# Patient Record
Sex: MALE | Race: WHITE | NOT HISPANIC OR LATINO
[De-identification: names, ages, dates, MRNs, and addresses within clinical notes are randomized per-mention and may not be internally consistent; named-entity substitution may affect disease eponyms.]

---

## 2017-01-13 ENCOUNTER — APPOINTMENT (OUTPATIENT)
Dept: MRI IMAGING | Facility: CLINIC | Age: 62
End: 2017-01-13

## 2017-01-13 ENCOUNTER — OUTPATIENT (OUTPATIENT)
Dept: OUTPATIENT SERVICES | Facility: HOSPITAL | Age: 62
LOS: 1 days | End: 2017-01-13
Payer: COMMERCIAL

## 2017-01-13 DIAGNOSIS — Z00.8 ENCOUNTER FOR OTHER GENERAL EXAMINATION: ICD-10-CM

## 2017-01-13 PROCEDURE — 73221 MRI JOINT UPR EXTREM W/O DYE: CPT

## 2017-01-23 ENCOUNTER — TRANSCRIPTION ENCOUNTER (OUTPATIENT)
Age: 62
End: 2017-01-23

## 2017-01-27 ENCOUNTER — APPOINTMENT (OUTPATIENT)
Dept: CARDIOLOGY | Facility: CLINIC | Age: 62
End: 2017-01-27

## 2017-01-27 ENCOUNTER — LABORATORY RESULT (OUTPATIENT)
Age: 62
End: 2017-01-27

## 2017-01-27 ENCOUNTER — NON-APPOINTMENT (OUTPATIENT)
Age: 62
End: 2017-01-27

## 2017-01-27 VITALS
OXYGEN SATURATION: 98 % | WEIGHT: 195 LBS | DIASTOLIC BLOOD PRESSURE: 68 MMHG | HEART RATE: 74 BPM | SYSTOLIC BLOOD PRESSURE: 136 MMHG | HEIGHT: 70 IN | BODY MASS INDEX: 27.92 KG/M2

## 2017-01-27 VITALS — WEIGHT: 195 LBS | HEIGHT: 70 IN | BODY MASS INDEX: 27.92 KG/M2

## 2017-01-30 LAB
ALBUMIN SERPL ELPH-MCNC: 4.5 G/DL
ALP BLD-CCNC: 58 U/L
ALT SERPL-CCNC: 20 U/L
ANION GAP SERPL CALC-SCNC: 16 MMOL/L
AST SERPL-CCNC: 18 U/L
BASOPHILS # BLD AUTO: 0.08 K/UL
BASOPHILS NFR BLD AUTO: 0.9 %
BILIRUB SERPL-MCNC: 0.6 MG/DL
BUN SERPL-MCNC: 16 MG/DL
CALCIUM SERPL-MCNC: 9.6 MG/DL
CHLORIDE SERPL-SCNC: 103 MMOL/L
CHOLEST SERPL-MCNC: 125 MG/DL
CHOLEST/HDLC SERPL: 2.4 RATIO
CO2 SERPL-SCNC: 24 MMOL/L
CREAT SERPL-MCNC: 0.88 MG/DL
EOSINOPHIL # BLD AUTO: 0.23 K/UL
EOSINOPHIL NFR BLD AUTO: 2.6 %
GLUCOSE SERPL-MCNC: 103 MG/DL
HBA1C MFR BLD HPLC: 5.8 %
HCT VFR BLD CALC: 42.2 %
HDLC SERPL-MCNC: 52 MG/DL
HGB BLD-MCNC: 13.8 G/DL
LDLC SERPL CALC-MCNC: 54 MG/DL
LYMPHOCYTES # BLD AUTO: 1.98 K/UL
LYMPHOCYTES NFR BLD AUTO: 21.9 %
MAN DIFF?: NORMAL
MCHC RBC-ENTMCNC: 30 PG
MCHC RBC-ENTMCNC: 32.7 GM/DL
MCV RBC AUTO: 91.7 FL
MONOCYTES # BLD AUTO: 0.16 K/UL
MONOCYTES NFR BLD AUTO: 1.8 %
NEUTROPHILS # BLD AUTO: 6.41 K/UL
NEUTROPHILS NFR BLD AUTO: 71 %
PLATELET # BLD AUTO: 322 K/UL
POTASSIUM SERPL-SCNC: 4.7 MMOL/L
PROT SERPL-MCNC: 7.1 G/DL
PSA FREE FLD-MCNC: 22.5 %
PSA FREE SERPL-MCNC: 0.02 NG/ML
PSA SERPL-MCNC: 0.11 NG/ML
RBC # BLD: 4.6 M/UL
RBC # FLD: 14.8 %
SODIUM SERPL-SCNC: 143 MMOL/L
TRIGL SERPL-MCNC: 97 MG/DL
TSH SERPL-ACNC: 1.98 UIU/ML
WBC # FLD AUTO: 9.03 K/UL

## 2017-03-06 ENCOUNTER — APPOINTMENT (OUTPATIENT)
Dept: OPHTHALMOLOGY | Facility: CLINIC | Age: 62
End: 2017-03-06

## 2017-03-06 RX ORDER — OXYCODONE HYDROCHLORIDE AND ACETAMINOPHEN 10; 325 MG/1; MG/1
10-325 TABLET ORAL
Refills: 0 | Status: DISCONTINUED | COMMUNITY
End: 2017-03-06

## 2017-03-10 ENCOUNTER — APPOINTMENT (OUTPATIENT)
Dept: OPHTHALMOLOGY | Facility: CLINIC | Age: 62
End: 2017-03-10

## 2017-03-15 ENCOUNTER — NON-APPOINTMENT (OUTPATIENT)
Age: 62
End: 2017-03-15

## 2017-03-15 ENCOUNTER — APPOINTMENT (OUTPATIENT)
Dept: CARDIOLOGY | Facility: CLINIC | Age: 62
End: 2017-03-15

## 2017-03-15 VITALS
DIASTOLIC BLOOD PRESSURE: 73 MMHG | SYSTOLIC BLOOD PRESSURE: 146 MMHG | WEIGHT: 192 LBS | HEART RATE: 64 BPM | OXYGEN SATURATION: 99 % | HEIGHT: 70 IN | BODY MASS INDEX: 27.49 KG/M2

## 2017-03-15 DIAGNOSIS — Z01.818 ENCOUNTER FOR OTHER PREPROCEDURAL EXAMINATION: ICD-10-CM

## 2017-03-17 ENCOUNTER — APPOINTMENT (OUTPATIENT)
Dept: OPHTHALMOLOGY | Facility: CLINIC | Age: 62
End: 2017-03-17

## 2017-04-03 ENCOUNTER — APPOINTMENT (OUTPATIENT)
Dept: OPHTHALMOLOGY | Facility: CLINIC | Age: 62
End: 2017-04-03

## 2017-04-05 ENCOUNTER — RX RENEWAL (OUTPATIENT)
Age: 62
End: 2017-04-05

## 2017-05-17 ENCOUNTER — APPOINTMENT (OUTPATIENT)
Dept: MAMMOGRAPHY | Facility: CLINIC | Age: 62
End: 2017-05-17

## 2017-05-17 ENCOUNTER — OUTPATIENT (OUTPATIENT)
Dept: OUTPATIENT SERVICES | Facility: HOSPITAL | Age: 62
LOS: 1 days | End: 2017-05-17
Payer: COMMERCIAL

## 2017-05-17 ENCOUNTER — APPOINTMENT (OUTPATIENT)
Dept: ULTRASOUND IMAGING | Facility: CLINIC | Age: 62
End: 2017-05-17

## 2017-05-17 DIAGNOSIS — Z00.8 ENCOUNTER FOR OTHER GENERAL EXAMINATION: ICD-10-CM

## 2017-05-17 PROCEDURE — 77066 DX MAMMO INCL CAD BI: CPT

## 2017-05-17 PROCEDURE — G0279: CPT

## 2017-05-17 PROCEDURE — G0279: CPT | Mod: 26

## 2017-05-17 PROCEDURE — 76641 ULTRASOUND BREAST COMPLETE: CPT

## 2017-05-17 PROCEDURE — 76641 ULTRASOUND BREAST COMPLETE: CPT | Mod: 26,50

## 2017-05-17 PROCEDURE — G0204: CPT | Mod: 26

## 2017-06-12 ENCOUNTER — APPOINTMENT (OUTPATIENT)
Dept: OPHTHALMOLOGY | Facility: CLINIC | Age: 62
End: 2017-06-12

## 2017-06-28 ENCOUNTER — RX RENEWAL (OUTPATIENT)
Age: 62
End: 2017-06-28

## 2017-07-10 ENCOUNTER — MEDICATION RENEWAL (OUTPATIENT)
Age: 62
End: 2017-07-10

## 2017-09-06 ENCOUNTER — NON-APPOINTMENT (OUTPATIENT)
Age: 62
End: 2017-09-06

## 2017-09-06 ENCOUNTER — APPOINTMENT (OUTPATIENT)
Dept: CARDIOLOGY | Facility: CLINIC | Age: 62
End: 2017-09-06
Payer: COMMERCIAL

## 2017-09-06 VITALS
BODY MASS INDEX: 28.49 KG/M2 | OXYGEN SATURATION: 95 % | WEIGHT: 199 LBS | HEIGHT: 70 IN | HEART RATE: 72 BPM | DIASTOLIC BLOOD PRESSURE: 81 MMHG | SYSTOLIC BLOOD PRESSURE: 139 MMHG

## 2017-09-06 DIAGNOSIS — F17.200 NICOTINE DEPENDENCE, UNSPECIFIED, UNCOMPLICATED: ICD-10-CM

## 2017-09-06 DIAGNOSIS — H16.002 UNSPECIFIED CORNEAL ULCER, LEFT EYE: ICD-10-CM

## 2017-09-06 PROCEDURE — 99214 OFFICE O/P EST MOD 30 MIN: CPT | Mod: 25

## 2017-09-06 PROCEDURE — 99406 BEHAV CHNG SMOKING 3-10 MIN: CPT

## 2017-09-06 PROCEDURE — 93000 ELECTROCARDIOGRAM COMPLETE: CPT

## 2017-09-24 ENCOUNTER — TRANSCRIPTION ENCOUNTER (OUTPATIENT)
Age: 62
End: 2017-09-24

## 2017-09-29 ENCOUNTER — APPOINTMENT (OUTPATIENT)
Dept: CARDIOLOGY | Facility: CLINIC | Age: 62
End: 2017-09-29
Payer: COMMERCIAL

## 2017-09-29 PROCEDURE — 90686 IIV4 VACC NO PRSV 0.5 ML IM: CPT

## 2017-09-29 PROCEDURE — 90471 IMMUNIZATION ADMIN: CPT

## 2017-10-02 ENCOUNTER — RX RENEWAL (OUTPATIENT)
Age: 62
End: 2017-10-02

## 2017-10-10 ENCOUNTER — RX RENEWAL (OUTPATIENT)
Age: 62
End: 2017-10-10

## 2017-12-05 ENCOUNTER — APPOINTMENT (OUTPATIENT)
Dept: MRI IMAGING | Facility: CLINIC | Age: 62
End: 2017-12-05
Payer: COMMERCIAL

## 2017-12-05 ENCOUNTER — OUTPATIENT (OUTPATIENT)
Dept: OUTPATIENT SERVICES | Facility: HOSPITAL | Age: 62
LOS: 1 days | End: 2017-12-05
Payer: COMMERCIAL

## 2017-12-05 DIAGNOSIS — Z00.8 ENCOUNTER FOR OTHER GENERAL EXAMINATION: ICD-10-CM

## 2017-12-05 PROCEDURE — A9585: CPT

## 2017-12-05 PROCEDURE — 74183 MRI ABD W/O CNTR FLWD CNTR: CPT | Mod: 26

## 2017-12-05 PROCEDURE — 74183 MRI ABD W/O CNTR FLWD CNTR: CPT

## 2017-12-14 ENCOUNTER — OUTPATIENT (OUTPATIENT)
Dept: OUTPATIENT SERVICES | Facility: HOSPITAL | Age: 62
LOS: 1 days | End: 2017-12-14
Payer: COMMERCIAL

## 2017-12-14 ENCOUNTER — APPOINTMENT (OUTPATIENT)
Dept: CT IMAGING | Facility: CLINIC | Age: 62
End: 2017-12-14

## 2017-12-14 DIAGNOSIS — Z00.8 ENCOUNTER FOR OTHER GENERAL EXAMINATION: ICD-10-CM

## 2017-12-14 PROCEDURE — 71250 CT THORAX DX C-: CPT | Mod: 26

## 2017-12-14 PROCEDURE — 71250 CT THORAX DX C-: CPT

## 2017-12-18 ENCOUNTER — APPOINTMENT (OUTPATIENT)
Dept: OPHTHALMOLOGY | Facility: CLINIC | Age: 62
End: 2017-12-18
Payer: COMMERCIAL

## 2017-12-18 PROCEDURE — 92014 COMPRE OPH EXAM EST PT 1/>: CPT

## 2017-12-29 ENCOUNTER — APPOINTMENT (OUTPATIENT)
Dept: CARDIOLOGY | Facility: CLINIC | Age: 62
End: 2017-12-29
Payer: COMMERCIAL

## 2017-12-29 PROCEDURE — 36415 COLL VENOUS BLD VENIPUNCTURE: CPT

## 2018-01-02 LAB
25(OH)D3 SERPL-MCNC: 45.6 NG/ML
ALBUMIN SERPL ELPH-MCNC: 4.4 G/DL
ALP BLD-CCNC: 63 U/L
ALT SERPL-CCNC: 23 U/L
ANION GAP SERPL CALC-SCNC: 10 MMOL/L
AST SERPL-CCNC: 17 U/L
BASOPHILS # BLD AUTO: 0.02 K/UL
BASOPHILS NFR BLD AUTO: 0.3 %
BILIRUB SERPL-MCNC: 0.5 MG/DL
BUN SERPL-MCNC: 16 MG/DL
CALCIUM SERPL-MCNC: 9.5 MG/DL
CHLORIDE SERPL-SCNC: 108 MMOL/L
CHOLEST SERPL-MCNC: 140 MG/DL
CHOLEST/HDLC SERPL: 2.5 RATIO
CK SERPL-CCNC: 112 U/L
CO2 SERPL-SCNC: 26 MMOL/L
CREAT SERPL-MCNC: 1.02 MG/DL
EOSINOPHIL # BLD AUTO: 0.19 K/UL
EOSINOPHIL NFR BLD AUTO: 2.9 %
GLUCOSE SERPL-MCNC: 106 MG/DL
HBA1C MFR BLD HPLC: 5.6 %
HCT VFR BLD CALC: 42.4 %
HDLC SERPL-MCNC: 56 MG/DL
HGB BLD-MCNC: 14.2 G/DL
IMM GRANULOCYTES NFR BLD AUTO: 0.6 %
LDLC SERPL CALC-MCNC: 72 MG/DL
LDLC SERPL DIRECT ASSAY-MCNC: 78 MG/DL
LYMPHOCYTES # BLD AUTO: 1.49 K/UL
LYMPHOCYTES NFR BLD AUTO: 22.4 %
MAN DIFF?: NORMAL
MCHC RBC-ENTMCNC: 30.3 PG
MCHC RBC-ENTMCNC: 33.5 GM/DL
MCV RBC AUTO: 90.6 FL
MONOCYTES # BLD AUTO: 0.53 K/UL
MONOCYTES NFR BLD AUTO: 8 %
NEUTROPHILS # BLD AUTO: 4.37 K/UL
NEUTROPHILS NFR BLD AUTO: 65.8 %
PLATELET # BLD AUTO: 269 K/UL
POTASSIUM SERPL-SCNC: 4.9 MMOL/L
PROT SERPL-MCNC: 6.8 G/DL
PSA FREE FLD-MCNC: 21.7
PSA FREE SERPL-MCNC: 0.05 NG/ML
PSA SERPL-MCNC: 0.23 NG/ML
RBC # BLD: 4.68 M/UL
RBC # FLD: 15.5 %
SODIUM SERPL-SCNC: 144 MMOL/L
TRIGL SERPL-MCNC: 59 MG/DL
TSH SERPL-ACNC: 1.36 UIU/ML
WBC # FLD AUTO: 6.64 K/UL

## 2018-01-15 ENCOUNTER — MEDICATION RENEWAL (OUTPATIENT)
Age: 63
End: 2018-01-15

## 2018-03-14 ENCOUNTER — APPOINTMENT (OUTPATIENT)
Dept: CARDIOLOGY | Facility: CLINIC | Age: 63
End: 2018-03-14
Payer: COMMERCIAL

## 2018-03-14 ENCOUNTER — NON-APPOINTMENT (OUTPATIENT)
Age: 63
End: 2018-03-14

## 2018-03-14 VITALS
SYSTOLIC BLOOD PRESSURE: 123 MMHG | DIASTOLIC BLOOD PRESSURE: 79 MMHG | OXYGEN SATURATION: 97 % | BODY MASS INDEX: 29.63 KG/M2 | HEIGHT: 70 IN | HEART RATE: 67 BPM | WEIGHT: 207 LBS

## 2018-03-14 DIAGNOSIS — H25.093 OTHER AGE-RELATED INCIPIENT CATARACT, BILATERAL: ICD-10-CM

## 2018-03-14 DIAGNOSIS — H16.012 CENTRAL CORNEAL ULCER, LEFT EYE: ICD-10-CM

## 2018-03-14 PROCEDURE — 93000 ELECTROCARDIOGRAM COMPLETE: CPT

## 2018-03-14 PROCEDURE — 99214 OFFICE O/P EST MOD 30 MIN: CPT | Mod: 25

## 2018-04-02 ENCOUNTER — MEDICATION RENEWAL (OUTPATIENT)
Age: 63
End: 2018-04-02

## 2018-04-03 ENCOUNTER — MEDICATION RENEWAL (OUTPATIENT)
Age: 63
End: 2018-04-03

## 2018-04-16 ENCOUNTER — MEDICATION RENEWAL (OUTPATIENT)
Age: 63
End: 2018-04-16

## 2018-04-24 ENCOUNTER — MEDICATION RENEWAL (OUTPATIENT)
Age: 63
End: 2018-04-24

## 2018-04-24 RX ORDER — BUPROPION HYDROCHLORIDE 150 MG/1
150 TABLET, FILM COATED ORAL 3 TIMES DAILY
Refills: 0 | Status: DISCONTINUED | COMMUNITY
End: 2018-04-24

## 2018-04-30 ENCOUNTER — MEDICATION RENEWAL (OUTPATIENT)
Age: 63
End: 2018-04-30

## 2018-05-09 ENCOUNTER — MEDICATION RENEWAL (OUTPATIENT)
Age: 63
End: 2018-05-09

## 2018-09-26 ENCOUNTER — NON-APPOINTMENT (OUTPATIENT)
Age: 63
End: 2018-09-26

## 2018-09-26 ENCOUNTER — APPOINTMENT (OUTPATIENT)
Dept: CARDIOLOGY | Facility: CLINIC | Age: 63
End: 2018-09-26
Payer: COMMERCIAL

## 2018-09-26 VITALS
DIASTOLIC BLOOD PRESSURE: 74 MMHG | BODY MASS INDEX: 28.35 KG/M2 | HEART RATE: 71 BPM | HEIGHT: 70 IN | OXYGEN SATURATION: 97 % | SYSTOLIC BLOOD PRESSURE: 126 MMHG | WEIGHT: 198 LBS

## 2018-09-26 PROCEDURE — 99214 OFFICE O/P EST MOD 30 MIN: CPT | Mod: 25

## 2018-09-26 PROCEDURE — 93000 ELECTROCARDIOGRAM COMPLETE: CPT

## 2018-09-26 RX ORDER — ALFUZOSIN HYDROCHLORIDE 10 MG/1
10 TABLET, EXTENDED RELEASE ORAL DAILY
Refills: 0 | Status: ACTIVE | COMMUNITY

## 2018-09-26 RX ORDER — ALFUZOSIN HYDROCHLORIDE 10 MG/1
10 TABLET, EXTENDED RELEASE ORAL DAILY
Qty: 90 | Refills: 2 | Status: DISCONTINUED | COMMUNITY
End: 2018-09-26

## 2018-09-27 ENCOUNTER — TRANSCRIPTION ENCOUNTER (OUTPATIENT)
Age: 63
End: 2018-09-27

## 2018-10-05 ENCOUNTER — APPOINTMENT (OUTPATIENT)
Dept: CARDIOLOGY | Facility: CLINIC | Age: 63
End: 2018-10-05
Payer: COMMERCIAL

## 2018-10-05 PROCEDURE — 90471 IMMUNIZATION ADMIN: CPT

## 2018-10-05 PROCEDURE — 90686 IIV4 VACC NO PRSV 0.5 ML IM: CPT

## 2018-10-17 ENCOUNTER — RX RENEWAL (OUTPATIENT)
Age: 63
End: 2018-10-17

## 2018-10-18 ENCOUNTER — MEDICATION RENEWAL (OUTPATIENT)
Age: 63
End: 2018-10-18

## 2018-12-26 ENCOUNTER — RX RENEWAL (OUTPATIENT)
Age: 63
End: 2018-12-26

## 2019-01-07 ENCOUNTER — APPOINTMENT (OUTPATIENT)
Dept: OPHTHALMOLOGY | Facility: CLINIC | Age: 64
End: 2019-01-07

## 2019-01-15 ENCOUNTER — RX RENEWAL (OUTPATIENT)
Age: 64
End: 2019-01-15

## 2019-01-16 ENCOUNTER — MEDICATION RENEWAL (OUTPATIENT)
Age: 64
End: 2019-01-16

## 2019-01-25 ENCOUNTER — MEDICATION RENEWAL (OUTPATIENT)
Age: 64
End: 2019-01-25

## 2019-01-25 ENCOUNTER — RX RENEWAL (OUTPATIENT)
Age: 64
End: 2019-01-25

## 2019-01-29 LAB
25(OH)D3 SERPL-MCNC: 43.8 NG/ML
BASOPHILS # BLD AUTO: 0.02 K/UL
BASOPHILS NFR BLD AUTO: 0.2 %
CHOLEST SERPL-MCNC: 132 MG/DL
CHOLEST/HDLC SERPL: 2.6 RATIO
CK SERPL-CCNC: 101 U/L
EOSINOPHIL # BLD AUTO: 0.17 K/UL
EOSINOPHIL NFR BLD AUTO: 2.1 %
HBA1C MFR BLD HPLC: 5.6 %
HCT VFR BLD CALC: 42.3 %
HDLC SERPL-MCNC: 51 MG/DL
HGB BLD-MCNC: 13.7 G/DL
IMM GRANULOCYTES NFR BLD AUTO: 1.3 %
LDLC SERPL CALC-MCNC: 68 MG/DL
LYMPHOCYTES # BLD AUTO: 1.67 K/UL
LYMPHOCYTES NFR BLD AUTO: 20.2 %
MAN DIFF?: NORMAL
MCHC RBC-ENTMCNC: 29.7 PG
MCHC RBC-ENTMCNC: 32.4 GM/DL
MCV RBC AUTO: 91.8 FL
MONOCYTES # BLD AUTO: 0.52 K/UL
MONOCYTES NFR BLD AUTO: 6.3 %
NEUTROPHILS # BLD AUTO: 5.76 K/UL
NEUTROPHILS NFR BLD AUTO: 69.9 %
PLATELET # BLD AUTO: 291 K/UL
PSA FREE FLD-MCNC: 32 %
PSA FREE SERPL-MCNC: 0.06 NG/ML
PSA SERPL-MCNC: 0.19 NG/ML
RBC # BLD: 4.61 M/UL
RBC # FLD: 15.6 %
T3FREE SERPL-MCNC: 2.98 PG/ML
T4 FREE SERPL-MCNC: 1.1 NG/DL
TRIGL SERPL-MCNC: 67 MG/DL
TSH SERPL-ACNC: 1.77 UIU/ML
WBC # FLD AUTO: 8.25 K/UL

## 2019-01-30 ENCOUNTER — MEDICATION RENEWAL (OUTPATIENT)
Age: 64
End: 2019-01-30

## 2019-01-30 ENCOUNTER — NON-APPOINTMENT (OUTPATIENT)
Age: 64
End: 2019-01-30

## 2019-01-30 ENCOUNTER — APPOINTMENT (OUTPATIENT)
Dept: CARDIOLOGY | Facility: CLINIC | Age: 64
End: 2019-01-30
Payer: COMMERCIAL

## 2019-01-30 VITALS
WEIGHT: 200 LBS | DIASTOLIC BLOOD PRESSURE: 78 MMHG | BODY MASS INDEX: 28.63 KG/M2 | SYSTOLIC BLOOD PRESSURE: 118 MMHG | HEIGHT: 70 IN

## 2019-01-30 PROCEDURE — 36415 COLL VENOUS BLD VENIPUNCTURE: CPT

## 2019-01-30 PROCEDURE — 99214 OFFICE O/P EST MOD 30 MIN: CPT | Mod: 25

## 2019-01-30 PROCEDURE — 93000 ELECTROCARDIOGRAM COMPLETE: CPT

## 2019-01-30 NOTE — REVIEW OF SYSTEMS
[see HPI] : see HPI [Negative] : Heme/Lymph [Feeling Fatigued] : not feeling fatigued [Shortness Of Breath] : no shortness of breath [Dyspnea on exertion] : not dyspnea during exertion [Chest  Pressure] : no chest pressure [Chest Pain] : no chest pain [Lower Ext Edema] : no extremity edema [Leg Claudication] : no intermittent leg claudication [Palpitations] : no palpitations [FreeTextEntry1] : Lower back issues as stated in HPI.

## 2019-01-30 NOTE — DISCUSSION/SUMMARY
[FreeTextEntry1] : Coronary artery disease/prior myocardial infarction: Stable currently and doing well overall. Stress test this year to monitor.\par Cigarette cessation: Discuss stopping cigarettes again. Emphasize the importance of him doing so, given his history. \par Hyperlipidemia/Prediabetes: Good on recent blood work. \par Follow up in 6 months.

## 2019-01-30 NOTE — CARDIOLOGY SUMMARY
[No Exercise Ind Arr] : no exercise induced arrhythmias [No Symptoms] : no Symptoms [___] : [unfilled] [LVEF ___%] : LVEF [unfilled]% [None] : no pulmonary hypertension [Enlarged] : enlarged LA size [Mild] : mild mitral regurgitation

## 2019-01-30 NOTE — PHYSICAL EXAM
[General Appearance - Well Developed] : well developed [Normal Appearance] : normal appearance [Well Groomed] : well groomed [General Appearance - Well Nourished] : well nourished [No Deformities] : no deformities [General Appearance - In No Acute Distress] : no acute distress [Normal Conjunctiva] : the conjunctiva exhibited no abnormalities [Eyelids - No Xanthelasma] : the eyelids demonstrated no xanthelasmas [Normal Oral Mucosa] : normal oral mucosa [No Oral Pallor] : no oral pallor [No Oral Cyanosis] : no oral cyanosis [Normal Jugular Venous A Waves Present] : normal jugular venous A waves present [Normal Jugular Venous V Waves Present] : normal jugular venous V waves present [No Jugular Venous Samaniego A Waves] : no jugular venous samaniego A waves [Respiration, Rhythm And Depth] : normal respiratory rhythm and effort [Exaggerated Use Of Accessory Muscles For Inspiration] : no accessory muscle use [Auscultation Breath Sounds / Voice Sounds] : lungs were clear to auscultation bilaterally [Abdomen Soft] : soft [Abdomen Tenderness] : non-tender [Abdomen Mass (___ Cm)] : no abdominal mass palpated [Abnormal Walk] : normal gait [Gait - Sufficient For Exercise Testing] : the gait was sufficient for exercise testing [Nail Clubbing] : no clubbing of the fingernails [Cyanosis, Localized] : no localized cyanosis [Petechial Hemorrhages (___cm)] : no petechial hemorrhages [Skin Color & Pigmentation] : normal skin color and pigmentation [] : no rash [No Venous Stasis] : no venous stasis [Skin Lesions] : no skin lesions [No Skin Ulcers] : no skin ulcer [No Xanthoma] : no  xanthoma was observed [Oriented To Time, Place, And Person] : oriented to person, place, and time [Affect] : the affect was normal [Mood] : the mood was normal [No Anxiety] : not feeling anxious [Normal Rate] : normal [Normal S1] : normal S1 [Normal S2] : normal S2 [I] : a grade 1 [2+] : left 2+ [1+] : left 1+ [No Abnormalities] : the abdominal aorta was not enlarged and no bruit was heard [No Pitting Edema] : no pitting edema present [S3] : no S3 [S4] : no S4 [Right Carotid Bruit] : no bruit heard over the right carotid [Left Carotid Bruit] : no bruit heard over the left carotid [Right Femoral Bruit] : no bruit heard over the right femoral artery [Left Femoral Bruit] : no bruit heard over the left femoral artery

## 2019-01-30 NOTE — HISTORY OF PRESENT ILLNESS
[FreeTextEntry1] : Denies chest discomfort, shortness of breath, orthopnea, lower extremity edema, palpitations, paroxysmal nocturnal dyspnea, presyncope or syncope.  Reviewed labs and results ok but no CMP which they will repeat today.

## 2019-02-01 ENCOUNTER — RX RENEWAL (OUTPATIENT)
Age: 64
End: 2019-02-01

## 2019-02-01 LAB
ALBUMIN SERPL ELPH-MCNC: 4.7 G/DL
ALP BLD-CCNC: 69 U/L
ALT SERPL-CCNC: 21 U/L
ANION GAP SERPL CALC-SCNC: 11 MMOL/L
AST SERPL-CCNC: 15 U/L
BILIRUB SERPL-MCNC: 0.5 MG/DL
BUN SERPL-MCNC: 17 MG/DL
CALCIUM SERPL-MCNC: 9.4 MG/DL
CHLORIDE SERPL-SCNC: 102 MMOL/L
CO2 SERPL-SCNC: 25 MMOL/L
CREAT SERPL-MCNC: 0.9 MG/DL
GLUCOSE SERPL-MCNC: 103 MG/DL
POTASSIUM SERPL-SCNC: 4.8 MMOL/L
PROT SERPL-MCNC: 7 G/DL
SODIUM SERPL-SCNC: 138 MMOL/L

## 2019-02-11 ENCOUNTER — MEDICATION RENEWAL (OUTPATIENT)
Age: 64
End: 2019-02-11

## 2019-02-12 ENCOUNTER — MEDICATION RENEWAL (OUTPATIENT)
Age: 64
End: 2019-02-12

## 2019-04-12 ENCOUNTER — RX RENEWAL (OUTPATIENT)
Age: 64
End: 2019-04-12

## 2019-04-29 ENCOUNTER — RX RENEWAL (OUTPATIENT)
Age: 64
End: 2019-04-29

## 2019-05-15 ENCOUNTER — TRANSCRIPTION ENCOUNTER (OUTPATIENT)
Age: 64
End: 2019-05-15

## 2019-07-08 ENCOUNTER — RX RENEWAL (OUTPATIENT)
Age: 64
End: 2019-07-08

## 2019-07-19 ENCOUNTER — APPOINTMENT (OUTPATIENT)
Dept: CARDIOLOGY | Facility: CLINIC | Age: 64
End: 2019-07-19
Payer: COMMERCIAL

## 2019-07-19 ENCOUNTER — RESULT CHARGE (OUTPATIENT)
Age: 64
End: 2019-07-19

## 2019-07-19 PROCEDURE — 93880 EXTRACRANIAL BILAT STUDY: CPT

## 2019-07-23 ENCOUNTER — APPOINTMENT (OUTPATIENT)
Dept: CARDIOLOGY | Facility: CLINIC | Age: 64
End: 2019-07-23
Payer: COMMERCIAL

## 2019-07-23 PROCEDURE — 93015 CV STRESS TEST SUPVJ I&R: CPT

## 2019-07-23 PROCEDURE — 78452 HT MUSCLE IMAGE SPECT MULT: CPT

## 2019-07-23 PROCEDURE — A9500: CPT

## 2019-07-31 ENCOUNTER — APPOINTMENT (OUTPATIENT)
Dept: CARDIOLOGY | Facility: CLINIC | Age: 64
End: 2019-07-31
Payer: COMMERCIAL

## 2019-07-31 ENCOUNTER — NON-APPOINTMENT (OUTPATIENT)
Age: 64
End: 2019-07-31

## 2019-07-31 VITALS — BODY MASS INDEX: 28.35 KG/M2 | WEIGHT: 198 LBS | HEIGHT: 70 IN

## 2019-07-31 VITALS — HEART RATE: 73 BPM | DIASTOLIC BLOOD PRESSURE: 71 MMHG | OXYGEN SATURATION: 98 % | SYSTOLIC BLOOD PRESSURE: 118 MMHG

## 2019-07-31 PROCEDURE — 93000 ELECTROCARDIOGRAM COMPLETE: CPT

## 2019-07-31 PROCEDURE — 99214 OFFICE O/P EST MOD 30 MIN: CPT | Mod: 25

## 2019-07-31 NOTE — PHYSICAL EXAM
[Normal Appearance] : normal appearance [General Appearance - Well Developed] : well developed [Well Groomed] : well groomed [General Appearance - Well Nourished] : well nourished [General Appearance - In No Acute Distress] : no acute distress [No Deformities] : no deformities [Normal Conjunctiva] : the conjunctiva exhibited no abnormalities [Eyelids - No Xanthelasma] : the eyelids demonstrated no xanthelasmas [Normal Oral Mucosa] : normal oral mucosa [No Oral Pallor] : no oral pallor [No Oral Cyanosis] : no oral cyanosis [Normal Jugular Venous A Waves Present] : normal jugular venous A waves present [Normal Jugular Venous V Waves Present] : normal jugular venous V waves present [No Jugular Venous Samaniego A Waves] : no jugular venous samaniego A waves [Respiration, Rhythm And Depth] : normal respiratory rhythm and effort [Exaggerated Use Of Accessory Muscles For Inspiration] : no accessory muscle use [Auscultation Breath Sounds / Voice Sounds] : lungs were clear to auscultation bilaterally [Abdomen Soft] : soft [Abdomen Tenderness] : non-tender [Abdomen Mass (___ Cm)] : no abdominal mass palpated [Gait - Sufficient For Exercise Testing] : the gait was sufficient for exercise testing [Abnormal Walk] : normal gait [Nail Clubbing] : no clubbing of the fingernails [Cyanosis, Localized] : no localized cyanosis [Petechial Hemorrhages (___cm)] : no petechial hemorrhages [Skin Color & Pigmentation] : normal skin color and pigmentation [] : no rash [No Venous Stasis] : no venous stasis [No Skin Ulcers] : no skin ulcer [Skin Lesions] : no skin lesions [No Xanthoma] : no  xanthoma was observed [Oriented To Time, Place, And Person] : oriented to person, place, and time [Affect] : the affect was normal [No Anxiety] : not feeling anxious [Mood] : the mood was normal [Normal Rate] : normal [Normal S1] : normal S1 [Normal S2] : normal S2 [I] : a grade 1 [2+] : right 2+ [1+] : left 1+ [No Abnormalities] : the abdominal aorta was not enlarged and no bruit was heard [No Pitting Edema] : no pitting edema present [S3] : no S3 [S4] : no S4 [Right Carotid Bruit] : no bruit heard over the right carotid [Left Carotid Bruit] : no bruit heard over the left carotid [Right Femoral Bruit] : no bruit heard over the right femoral artery [Left Femoral Bruit] : no bruit heard over the left femoral artery

## 2019-07-31 NOTE — REASON FOR VISIT
[Follow-Up - Clinic] : a clinic follow-up of [Hyperlipidemia] : hyperlipidemia [Coronary Artery Disease] : coronary artery disease [Hypertension] : hypertension

## 2019-07-31 NOTE — REVIEW OF SYSTEMS
[see HPI] : see HPI [Negative] : Heme/Lymph [Feeling Fatigued] : not feeling fatigued [Shortness Of Breath] : no shortness of breath [Chest  Pressure] : no chest pressure [Dyspnea on exertion] : not dyspnea during exertion [Chest Pain] : no chest pain [Leg Claudication] : no intermittent leg claudication [Lower Ext Edema] : no extremity edema [Palpitations] : no palpitations [FreeTextEntry1] : Lower back issues as stated in HPI.

## 2019-07-31 NOTE — CARDIOLOGY SUMMARY
[No Exercise Ind Arr] : no exercise induced arrhythmias [No Symptoms] : no Symptoms [___] : [unfilled] [LVEF ___%] : LVEF [unfilled]% [None] : no pulmonary hypertension [Enlarged] : enlarged LA size [Mild] : mild mitral regurgitation [No Ischemia] : no Ischemia

## 2019-07-31 NOTE — DISCUSSION/SUMMARY
[FreeTextEntry1] : Coronary artery disease/prior myocardial infarction: Stable currently and doing well overall. Stress test stable without changes. .\par Cigarette cessation: Discuss stopping cigarettes again. Emphasize the importance of him doing so, given his history. \par Hyperlipidemia/Prediabetes: Good on recent blood work. \par Follow up in 12 months.

## 2019-07-31 NOTE — HISTORY OF PRESENT ILLNESS
[FreeTextEntry1] : Doing well without new issues.  Reviewed labs and results are good. Denies chest pain, shortness of breath, dyspnea on exertion, palpitations, orthopnea, paroxysmal nocturnal dyspnea, claudication, dizziness,  lightheadedness, presyncopal, or syncopal symptoms.\par

## 2019-10-11 ENCOUNTER — OTHER (OUTPATIENT)
Age: 64
End: 2019-10-11

## 2019-10-11 DIAGNOSIS — Z00.00 ENCOUNTER FOR GENERAL ADULT MEDICAL EXAMINATION W/OUT ABNORMAL FINDINGS: ICD-10-CM

## 2019-12-02 ENCOUNTER — OUTPATIENT (OUTPATIENT)
Dept: OUTPATIENT SERVICES | Facility: HOSPITAL | Age: 64
LOS: 1 days | End: 2019-12-02
Payer: COMMERCIAL

## 2019-12-02 VITALS
HEIGHT: 70 IN | TEMPERATURE: 99 F | OXYGEN SATURATION: 95 % | DIASTOLIC BLOOD PRESSURE: 70 MMHG | WEIGHT: 203.93 LBS | SYSTOLIC BLOOD PRESSURE: 108 MMHG | RESPIRATION RATE: 16 BRPM | HEART RATE: 88 BPM

## 2019-12-02 DIAGNOSIS — Z01.818 ENCOUNTER FOR OTHER PREPROCEDURAL EXAMINATION: ICD-10-CM

## 2019-12-02 DIAGNOSIS — Z98.890 OTHER SPECIFIED POSTPROCEDURAL STATES: Chronic | ICD-10-CM

## 2019-12-02 DIAGNOSIS — N35.919 UNSPECIFIED URETHRAL STRICTURE, MALE, UNSPECIFIED SITE: ICD-10-CM

## 2019-12-02 DIAGNOSIS — I25.10 ATHEROSCLEROTIC HEART DISEASE OF NATIVE CORONARY ARTERY WITHOUT ANGINA PECTORIS: Chronic | ICD-10-CM

## 2019-12-02 LAB
ANION GAP SERPL CALC-SCNC: 7 MMOL/L — SIGNIFICANT CHANGE UP (ref 5–17)
APPEARANCE UR: CLEAR — SIGNIFICANT CHANGE UP
BILIRUB UR-MCNC: NEGATIVE — SIGNIFICANT CHANGE UP
BUN SERPL-MCNC: 18 MG/DL — SIGNIFICANT CHANGE UP (ref 7–23)
CALCIUM SERPL-MCNC: 9.2 MG/DL — SIGNIFICANT CHANGE UP (ref 8.5–10.1)
CHLORIDE SERPL-SCNC: 108 MMOL/L — SIGNIFICANT CHANGE UP (ref 96–108)
CO2 SERPL-SCNC: 27 MMOL/L — SIGNIFICANT CHANGE UP (ref 22–31)
COLOR SPEC: SIGNIFICANT CHANGE UP
CREAT SERPL-MCNC: 0.86 MG/DL — SIGNIFICANT CHANGE UP (ref 0.5–1.3)
DIFF PNL FLD: NEGATIVE — SIGNIFICANT CHANGE UP
GLUCOSE SERPL-MCNC: 100 MG/DL — HIGH (ref 70–99)
GLUCOSE UR QL: NEGATIVE — SIGNIFICANT CHANGE UP
HCT VFR BLD CALC: 43.5 % — SIGNIFICANT CHANGE UP (ref 39–50)
HGB BLD-MCNC: 14.5 G/DL — SIGNIFICANT CHANGE UP (ref 13–17)
KETONES UR-MCNC: NEGATIVE — SIGNIFICANT CHANGE UP
LEUKOCYTE ESTERASE UR-ACNC: ABNORMAL
MCHC RBC-ENTMCNC: 30.4 PG — SIGNIFICANT CHANGE UP (ref 27–34)
MCHC RBC-ENTMCNC: 33.3 GM/DL — SIGNIFICANT CHANGE UP (ref 32–36)
MCV RBC AUTO: 91.2 FL — SIGNIFICANT CHANGE UP (ref 80–100)
NITRITE UR-MCNC: NEGATIVE — SIGNIFICANT CHANGE UP
NRBC # BLD: 0 /100 WBCS — SIGNIFICANT CHANGE UP (ref 0–0)
PH UR: 5 — SIGNIFICANT CHANGE UP (ref 5–8)
PLATELET # BLD AUTO: 275 K/UL — SIGNIFICANT CHANGE UP (ref 150–400)
POTASSIUM SERPL-MCNC: 4.2 MMOL/L — SIGNIFICANT CHANGE UP (ref 3.5–5.3)
POTASSIUM SERPL-SCNC: 4.2 MMOL/L — SIGNIFICANT CHANGE UP (ref 3.5–5.3)
PROT UR-MCNC: NEGATIVE — SIGNIFICANT CHANGE UP
RBC # BLD: 4.77 M/UL — SIGNIFICANT CHANGE UP (ref 4.2–5.8)
RBC # FLD: 15.3 % — HIGH (ref 10.3–14.5)
SODIUM SERPL-SCNC: 142 MMOL/L — SIGNIFICANT CHANGE UP (ref 135–145)
SP GR SPEC: 1.01 — SIGNIFICANT CHANGE UP (ref 1.01–1.02)
UROBILINOGEN FLD QL: NEGATIVE — SIGNIFICANT CHANGE UP
WBC # BLD: 7.11 K/UL — SIGNIFICANT CHANGE UP (ref 3.8–10.5)
WBC # FLD AUTO: 7.11 K/UL — SIGNIFICANT CHANGE UP (ref 3.8–10.5)

## 2019-12-02 PROCEDURE — 93005 ELECTROCARDIOGRAM TRACING: CPT

## 2019-12-02 PROCEDURE — 80048 BASIC METABOLIC PNL TOTAL CA: CPT

## 2019-12-02 PROCEDURE — 85027 COMPLETE CBC AUTOMATED: CPT

## 2019-12-02 PROCEDURE — 93010 ELECTROCARDIOGRAM REPORT: CPT

## 2019-12-02 PROCEDURE — 81001 URINALYSIS AUTO W/SCOPE: CPT

## 2019-12-02 PROCEDURE — 36415 COLL VENOUS BLD VENIPUNCTURE: CPT

## 2019-12-02 PROCEDURE — 87086 URINE CULTURE/COLONY COUNT: CPT

## 2019-12-02 PROCEDURE — G0463: CPT

## 2019-12-02 NOTE — H&P PST ADULT - HISTORY OF PRESENT ILLNESS
63 y/o male, PMH f HTN, MI, CAD with 2 stents since 2007, with c/o urinary symptoms, takes a long time to urinate. Was seen by Dr Longoria in the office, cystoscopy done, with lot of residual  urine after emptying. Symptoms since 2 yrs, getting worse over the past few months. . Scheduled for cystoscopy retrograde urethrogram direct vision internal urethrotomy. Pre op testing today.

## 2019-12-02 NOTE — H&P PST ADULT - NSICDXPROBLEM_GEN_ALL_CORE_FT
PROBLEM DIAGNOSES  Problem: Urethral stricture  Assessment and Plan: Retrograde urethrogram, Direct vision internal urethrotomy.

## 2019-12-02 NOTE — H&P PST ADULT - SKIN/BREAST
IR H&P    HPI:  40year old male with proteinuria presents for medical renal biopsy  PMH:  Proteinuria  Asthma    PSH:  Hand surgery  Shoulder surgery  Appendectomy    Physical exam:  Gen: NAD    A/P:  40year old male with proteinuria     - Non targeted medical renal biopsy using [facility] [place][note 3]  negative

## 2019-12-02 NOTE — H&P PST ADULT - VISION (WITH CORRECTIVE LENSES IF THE PATIENT USUALLY WEARS THEM):
glasses Normal vision: sees adequately in most situations; can see medication labels, newsprint/glasses

## 2019-12-02 NOTE — H&P PST ADULT - ASSESSMENT
65 y/o male, PMH f HTN, MI, CAD with 2 stents since 2007, with c/o urinary symptoms, takes a long time to urinate. Was seen by Dr Longoria in the office, cystoscopy done, with lot of residual  urine after emptying. Symptoms since 2 yrs, getting worse over the past few months. . Scheduled for cystoscopy retrograde urethrogram direct vision internal urethrotomy. Pre op testing today.  cardiac eval advised.

## 2019-12-02 NOTE — H&P PST ADULT - NSICDXPASTSURGICALHX_GEN_ALL_CORE_FT
PAST SURGICAL HISTORY:  S/P rotator cuff surgery left 2017 PAST SURGICAL HISTORY:  CAD S/P percutaneous coronary angioplasty 2 stents Taxus 2007    S/P rotator cuff surgery left 2017

## 2019-12-02 NOTE — H&P PST ADULT - NSICDXPASTMEDICALHX_GEN_ALL_CORE_FT
PAST MEDICAL HISTORY:  CAD S/P percutaneous coronary angioplasty     Heart attack     HTN (hypertension)

## 2019-12-03 LAB
CULTURE RESULTS: NO GROWTH — SIGNIFICANT CHANGE UP
SPECIMEN SOURCE: SIGNIFICANT CHANGE UP

## 2019-12-06 ENCOUNTER — APPOINTMENT (OUTPATIENT)
Dept: CARDIOLOGY | Facility: CLINIC | Age: 64
End: 2019-12-06
Payer: COMMERCIAL

## 2019-12-06 VITALS
OXYGEN SATURATION: 96 % | DIASTOLIC BLOOD PRESSURE: 69 MMHG | WEIGHT: 206 LBS | HEIGHT: 70 IN | BODY MASS INDEX: 29.49 KG/M2 | SYSTOLIC BLOOD PRESSURE: 105 MMHG | HEART RATE: 73 BPM

## 2019-12-06 DIAGNOSIS — I25.2 OLD MYOCARDIAL INFARCTION: ICD-10-CM

## 2019-12-06 PROCEDURE — 99215 OFFICE O/P EST HI 40 MIN: CPT

## 2019-12-06 NOTE — CARDIOLOGY SUMMARY
[No Ischemia] : no Ischemia [No Symptoms] : no Symptoms [No Exercise Ind Arr] : no exercise induced arrhythmias [___] : [unfilled] [LVEF ___%] : LVEF [unfilled]% [Mild] : mild mitral regurgitation [Enlarged] : enlarged LA size [None] : no pulmonary hypertension

## 2019-12-25 ENCOUNTER — RX RENEWAL (OUTPATIENT)
Age: 64
End: 2019-12-25

## 2019-12-27 ENCOUNTER — RX RENEWAL (OUTPATIENT)
Age: 64
End: 2019-12-27

## 2020-01-03 ENCOUNTER — RX RENEWAL (OUTPATIENT)
Age: 65
End: 2020-01-03

## 2020-01-07 PROBLEM — I10 ESSENTIAL (PRIMARY) HYPERTENSION: Chronic | Status: ACTIVE | Noted: 2019-12-02

## 2020-01-14 ENCOUNTER — OUTPATIENT (OUTPATIENT)
Dept: OUTPATIENT SERVICES | Facility: HOSPITAL | Age: 65
LOS: 1 days | End: 2020-01-14
Payer: COMMERCIAL

## 2020-01-14 VITALS
TEMPERATURE: 98 F | HEIGHT: 70 IN | DIASTOLIC BLOOD PRESSURE: 70 MMHG | RESPIRATION RATE: 14 BRPM | OXYGEN SATURATION: 97 % | HEART RATE: 87 BPM | SYSTOLIC BLOOD PRESSURE: 126 MMHG | WEIGHT: 199.96 LBS

## 2020-01-14 DIAGNOSIS — N35.919 UNSPECIFIED URETHRAL STRICTURE, MALE, UNSPECIFIED SITE: ICD-10-CM

## 2020-01-14 DIAGNOSIS — Z01.818 ENCOUNTER FOR OTHER PREPROCEDURAL EXAMINATION: ICD-10-CM

## 2020-01-14 DIAGNOSIS — N35.911 UNSPECIFIED URETHRAL STRICTURE, MALE, MEATAL: ICD-10-CM

## 2020-01-14 DIAGNOSIS — I25.10 ATHEROSCLEROTIC HEART DISEASE OF NATIVE CORONARY ARTERY WITHOUT ANGINA PECTORIS: Chronic | ICD-10-CM

## 2020-01-14 DIAGNOSIS — Z98.890 OTHER SPECIFIED POSTPROCEDURAL STATES: Chronic | ICD-10-CM

## 2020-01-14 LAB
ALBUMIN SERPL ELPH-MCNC: 3.9 G/DL — SIGNIFICANT CHANGE UP (ref 3.3–5)
ALP SERPL-CCNC: 70 U/L — SIGNIFICANT CHANGE UP (ref 40–120)
ALT FLD-CCNC: 28 U/L — SIGNIFICANT CHANGE UP (ref 12–78)
ANION GAP SERPL CALC-SCNC: 6 MMOL/L — SIGNIFICANT CHANGE UP (ref 5–17)
APPEARANCE UR: CLEAR — SIGNIFICANT CHANGE UP
AST SERPL-CCNC: 18 U/L — SIGNIFICANT CHANGE UP (ref 15–37)
BILIRUB SERPL-MCNC: 0.5 MG/DL — SIGNIFICANT CHANGE UP (ref 0.2–1.2)
BILIRUB UR-MCNC: NEGATIVE — SIGNIFICANT CHANGE UP
BUN SERPL-MCNC: 17 MG/DL — SIGNIFICANT CHANGE UP (ref 7–23)
CALCIUM SERPL-MCNC: 9.1 MG/DL — SIGNIFICANT CHANGE UP (ref 8.5–10.1)
CHLORIDE SERPL-SCNC: 109 MMOL/L — HIGH (ref 96–108)
CO2 SERPL-SCNC: 27 MMOL/L — SIGNIFICANT CHANGE UP (ref 22–31)
COLOR SPEC: YELLOW — SIGNIFICANT CHANGE UP
CREAT SERPL-MCNC: 0.88 MG/DL — SIGNIFICANT CHANGE UP (ref 0.5–1.3)
DIFF PNL FLD: NEGATIVE — SIGNIFICANT CHANGE UP
GLUCOSE SERPL-MCNC: 131 MG/DL — HIGH (ref 70–99)
GLUCOSE UR QL: NEGATIVE — SIGNIFICANT CHANGE UP
HCT VFR BLD CALC: 40.9 % — SIGNIFICANT CHANGE UP (ref 39–50)
HGB BLD-MCNC: 13.6 G/DL — SIGNIFICANT CHANGE UP (ref 13–17)
KETONES UR-MCNC: NEGATIVE — SIGNIFICANT CHANGE UP
LEUKOCYTE ESTERASE UR-ACNC: ABNORMAL
MCHC RBC-ENTMCNC: 30.8 PG — SIGNIFICANT CHANGE UP (ref 27–34)
MCHC RBC-ENTMCNC: 33.3 GM/DL — SIGNIFICANT CHANGE UP (ref 32–36)
MCV RBC AUTO: 92.7 FL — SIGNIFICANT CHANGE UP (ref 80–100)
NITRITE UR-MCNC: POSITIVE
NRBC # BLD: 0 /100 WBCS — SIGNIFICANT CHANGE UP (ref 0–0)
PH UR: 6 — SIGNIFICANT CHANGE UP (ref 5–8)
PLATELET # BLD AUTO: 274 K/UL — SIGNIFICANT CHANGE UP (ref 150–400)
POTASSIUM SERPL-MCNC: 4.4 MMOL/L — SIGNIFICANT CHANGE UP (ref 3.5–5.3)
POTASSIUM SERPL-SCNC: 4.4 MMOL/L — SIGNIFICANT CHANGE UP (ref 3.5–5.3)
PROT SERPL-MCNC: 7.1 G/DL — SIGNIFICANT CHANGE UP (ref 6–8.3)
PROT UR-MCNC: 15
RBC # BLD: 4.41 M/UL — SIGNIFICANT CHANGE UP (ref 4.2–5.8)
RBC # FLD: 16 % — HIGH (ref 10.3–14.5)
SODIUM SERPL-SCNC: 142 MMOL/L — SIGNIFICANT CHANGE UP (ref 135–145)
SP GR SPEC: 1.02 — SIGNIFICANT CHANGE UP (ref 1.01–1.02)
UROBILINOGEN FLD QL: NEGATIVE — SIGNIFICANT CHANGE UP
WBC # BLD: 7.44 K/UL — SIGNIFICANT CHANGE UP (ref 3.8–10.5)
WBC # FLD AUTO: 7.44 K/UL — SIGNIFICANT CHANGE UP (ref 3.8–10.5)

## 2020-01-14 PROCEDURE — 81001 URINALYSIS AUTO W/SCOPE: CPT

## 2020-01-14 PROCEDURE — 85027 COMPLETE CBC AUTOMATED: CPT

## 2020-01-14 PROCEDURE — 87086 URINE CULTURE/COLONY COUNT: CPT

## 2020-01-14 PROCEDURE — 36415 COLL VENOUS BLD VENIPUNCTURE: CPT

## 2020-01-14 PROCEDURE — 80053 COMPREHEN METABOLIC PANEL: CPT

## 2020-01-14 PROCEDURE — G0463: CPT

## 2020-01-14 RX ORDER — ASPIRIN/CALCIUM CARB/MAGNESIUM 324 MG
1 TABLET ORAL
Qty: 0 | Refills: 0 | DISCHARGE

## 2020-01-14 NOTE — H&P PST ADULT - RESPIRATORY COMMENTS
Positive Lymph Node LEFT neck - pt notes has been Biopsied in the past and is negative - notes he "has had it a long time"

## 2020-01-14 NOTE — H&P PST ADULT - HISTORY OF PRESENT ILLNESS
63 y/o male, PMH f HTN, MI, CAD with 2 stents since 2007, with c/o urinary symptoms, takes a long time to urinate. Was seen by Dr Longoria in the office, cystoscopy done, with lot of residual  urine after emptying. Symptoms since 2 yrs, getting worse over the past few months. . Scheduled for cystoscopy retrograde urethrogram direct vision internal urethrotomy. Pre op testing today. 63 y/o male, PMH  HTN, MI, CAD with 2 stents since 2007, with c/o urinary symptoms, takes a long time to urinate. Was seen by Dr Longoria in the office, cystoscopy done, with lot of residual  urine after emptying. Symptoms since 2 yrs, getting worse over the past few months. . Scheduled for cystoscopy retrograde urethrogram direct vision internal urethrotomy. Pre op testing today.    OF NOTE: Pt was originally scheduled for procedure on 12/22/2019 - he developed Shingles therefore case was postponed - pt returns today for PST as case now rescheduled for 1/22/2020 - Pt was already seen for cardiac clearance by Dr Lazaro - as per Dr Avitia office he will review PST's and add addendum to clearance note -

## 2020-01-14 NOTE — H&P PST ADULT - NSICDXPASTSURGICALHX_GEN_ALL_CORE_FT
PAST SURGICAL HISTORY:  CAD S/P percutaneous coronary angioplasty 2 stents Taxus 2007    S/P rotator cuff surgery left 2017

## 2020-01-14 NOTE — H&P PST ADULT - ASSESSMENT
64 year old male with Urethral Stricture, Unspecified - scheduled for Retrograde Urethragram - Direct Vision Internal Urethrotomy with Dr Luis A Longoria on 1/22/2020 -

## 2020-01-14 NOTE — H&P PST ADULT - GENITOURINARY COMMENTS
SEE HPI- Pt notes "it is taking too long for me to urinate and I am not completely emptying my bladder"

## 2020-01-14 NOTE — H&P PST ADULT - NSICDXPASTMEDICALHX_GEN_ALL_CORE_FT
PAST MEDICAL HISTORY:  CAD S/P percutaneous coronary angioplasty     Heart attack     HTN (hypertension) PAST MEDICAL HISTORY:  Back pain     CAD S/P percutaneous coronary angioplasty     Heart attack     HTN (hypertension)

## 2020-01-14 NOTE — H&P PST ADULT - NEGATIVE ENMT SYMPTOMS
no tinnitus/no nasal congestion/no hearing difficulty/no dysphagia/no throat pain/no vertigo/no sinus symptoms/no post-nasal discharge

## 2020-01-14 NOTE — H&P PST ADULT - VISION (WITH CORRECTIVE LENSES IF THE PATIENT USUALLY WEARS THEM):
glasses/Normal vision: sees adequately in most situations; can see medication labels, newsprint Wears RX Eyeglasses/Normal vision: sees adequately in most situations; can see medication labels, newsprint

## 2020-01-15 LAB
CULTURE RESULTS: NO GROWTH — SIGNIFICANT CHANGE UP
SPECIMEN SOURCE: SIGNIFICANT CHANGE UP

## 2020-01-15 NOTE — PHYSICAL EXAM
[General Appearance - Well Developed] : well developed [Normal Appearance] : normal appearance [No Deformities] : no deformities [General Appearance - Well Nourished] : well nourished [Well Groomed] : well groomed [General Appearance - In No Acute Distress] : no acute distress [Eyelids - No Xanthelasma] : the eyelids demonstrated no xanthelasmas [Normal Conjunctiva] : the conjunctiva exhibited no abnormalities [No Oral Pallor] : no oral pallor [Normal Oral Mucosa] : normal oral mucosa [Normal Jugular Venous V Waves Present] : normal jugular venous V waves present [Normal Jugular Venous A Waves Present] : normal jugular venous A waves present [No Oral Cyanosis] : no oral cyanosis [Respiration, Rhythm And Depth] : normal respiratory rhythm and effort [No Jugular Venous Samaniego A Waves] : no jugular venous samaniego A waves [Exaggerated Use Of Accessory Muscles For Inspiration] : no accessory muscle use [Auscultation Breath Sounds / Voice Sounds] : lungs were clear to auscultation bilaterally [Abdomen Soft] : soft [Abdomen Tenderness] : non-tender [Abnormal Walk] : normal gait [Abdomen Mass (___ Cm)] : no abdominal mass palpated [Nail Clubbing] : no clubbing of the fingernails [Gait - Sufficient For Exercise Testing] : the gait was sufficient for exercise testing [Petechial Hemorrhages (___cm)] : no petechial hemorrhages [Cyanosis, Localized] : no localized cyanosis [] : no rash [No Venous Stasis] : no venous stasis [Skin Color & Pigmentation] : normal skin color and pigmentation [No Xanthoma] : no  xanthoma was observed [Skin Lesions] : no skin lesions [No Skin Ulcers] : no skin ulcer [Oriented To Time, Place, And Person] : oriented to person, place, and time [Affect] : the affect was normal [No Anxiety] : not feeling anxious [Mood] : the mood was normal [Normal Rate] : normal [Normal S2] : normal S2 [Normal S1] : normal S1 [I] : a grade 1 [2+] : left 2+ [1+] : left 1+ [No Abnormalities] : the abdominal aorta was not enlarged and no bruit was heard [No Pitting Edema] : no pitting edema present [S3] : no S3 [S4] : no S4 [Right Carotid Bruit] : no bruit heard over the right carotid [Left Carotid Bruit] : no bruit heard over the left carotid [Right Femoral Bruit] : no bruit heard over the right femoral artery [Left Femoral Bruit] : no bruit heard over the left femoral artery

## 2020-01-15 NOTE — HISTORY OF PRESENT ILLNESS
[Preoperative Visit] : for a medical evaluation prior to surgery [Scheduled Procedure ___] : a [unfilled] [Date of Surgery ___] : on [unfilled] [Good] : Good [Surgeon Name ___] : surgeon: [unfilled] [Cardiovascular Disease] : cardiovascular disease [Frequent Aspirin Use] : frequent aspirin use [Prior Anesthesia] : Prior anesthesia [Electrocardiogram] : ~T an ECG ~C was performed [Echocardiogram] : ~T an echocardiogram ~C was performed [Cardiovascular Stress Test] : a cardiac stress test ~T ~C was performed [Cardiac Catheterization  (Diagnostic)] : cardiac catheterization ~T ~C was performed [Fair] : Fair [Fever] : no fever [Chills] : no chills [Fatigue] : no fatigue [Chest Pain] : no chest pain [Cough] : no cough [Dyspnea] : no dyspnea [Abdominal Pain] : no abdominal pain [Prev Anesthesia Reaction] : no previous anesthesia reaction [de-identified] : Fax [phone]

## 2020-01-15 NOTE — ADDENDUM
[FreeTextEntry1] : 1/15/20.  Addendum. PSA WNL. No other changes.  Can stop plavix as instructed previously but must continue aspirin.

## 2020-01-15 NOTE — DISCUSSION/SUMMARY
[Procedure Low Risk] : the procedure risk is low [Optimized for Surgery] : the patient is optimized for surgery [Patient Low Risk] : the patient is a low surgical risk [As per surgery] : as per surgery [Continue] : Continue medications as currently directed [FreeTextEntry3] : Stop Plavix 5 dyas prior and continue aspirin through surgery. After surgery when cleared by surgeon, stop aspirn and restart Plavix alone. [FreeTextEntry1] : Pre op lab work within normal limits. Valera Periop risk score is 0.7 % for a Cv event.

## 2020-01-21 ENCOUNTER — TRANSCRIPTION ENCOUNTER (OUTPATIENT)
Age: 65
End: 2020-01-21

## 2020-01-21 NOTE — ASU PATIENT PROFILE, ADULT - PATIENT REPRESENTATIVE PHONE
Initial Anesthesia Post-op Note    Patient: Jacque Bacon  Procedure(s) Performed: HIP ARTHROSCOPY, FEMEROPLASTY LEFT, HIP ARTHROSCOPY, LABRAL REPAIR LEFT - LEFT  Anesthesia type: General    Vitals Value Taken Time   Temp 98.1 1/16/2020  1:07 PM   Pulse 112 1/16/2020  1:06 PM   Resp 16 1/16/2020  1:07 PM   SpO2 96 % 1/16/2020  1:06 PM   /75 1/16/2020  1:05 PM   Vitals shown include unvalidated device data.    Last 24 I/O:     Intake/Output Summary (Last 24 hours) at 1/16/2020 1307  Last data filed at 1/16/2020 1141  Gross per 24 hour   Intake 1000 ml   Output --   Net 1000 ml         Patient Location: PACU Phase 1  Post-op Vital Signs:stable  Level of Consciousness: participates in exam, awake and answers questions appropriately  Respiratory Status: spontaneous ventilation  Cardiovascular stable  Hydration: euvolemic  Pain Management: adequately controlled  Handoff: Handoff to receiving nurse was performed and questions were answered  Nausea: None  Airway Patency:patent  Post-op Assessment: awake, alert, appropriately conversant, or baseline and no complications    
[phone]

## 2020-01-22 ENCOUNTER — OUTPATIENT (OUTPATIENT)
Dept: OUTPATIENT SERVICES | Facility: HOSPITAL | Age: 65
LOS: 1 days | End: 2020-01-22
Payer: COMMERCIAL

## 2020-01-22 VITALS
RESPIRATION RATE: 14 BRPM | OXYGEN SATURATION: 94 % | HEIGHT: 70 IN | DIASTOLIC BLOOD PRESSURE: 75 MMHG | WEIGHT: 199.96 LBS | SYSTOLIC BLOOD PRESSURE: 134 MMHG | HEART RATE: 82 BPM | TEMPERATURE: 98 F

## 2020-01-22 VITALS
DIASTOLIC BLOOD PRESSURE: 64 MMHG | TEMPERATURE: 98 F | SYSTOLIC BLOOD PRESSURE: 124 MMHG | HEART RATE: 67 BPM | OXYGEN SATURATION: 97 % | RESPIRATION RATE: 14 BRPM

## 2020-01-22 DIAGNOSIS — I25.10 ATHEROSCLEROTIC HEART DISEASE OF NATIVE CORONARY ARTERY WITHOUT ANGINA PECTORIS: Chronic | ICD-10-CM

## 2020-01-22 DIAGNOSIS — N35.911 UNSPECIFIED URETHRAL STRICTURE, MALE, MEATAL: ICD-10-CM

## 2020-01-22 DIAGNOSIS — N35.919 UNSPECIFIED URETHRAL STRICTURE, MALE, UNSPECIFIED SITE: ICD-10-CM

## 2020-01-22 DIAGNOSIS — Z98.890 OTHER SPECIFIED POSTPROCEDURAL STATES: Chronic | ICD-10-CM

## 2020-01-22 PROCEDURE — C1769: CPT

## 2020-01-22 PROCEDURE — 52276 CYSTOSCOPY AND TREATMENT: CPT

## 2020-01-22 PROCEDURE — 76000 FLUOROSCOPY <1 HR PHYS/QHP: CPT

## 2020-01-22 RX ORDER — SODIUM CHLORIDE 9 MG/ML
1000 INJECTION INTRAMUSCULAR; INTRAVENOUS; SUBCUTANEOUS
Refills: 0 | Status: DISCONTINUED | OUTPATIENT
Start: 2020-01-22 | End: 2020-01-22

## 2020-01-22 RX ORDER — CEFTRIAXONE 500 MG/1
2000 INJECTION, POWDER, FOR SOLUTION INTRAMUSCULAR; INTRAVENOUS ONCE
Refills: 0 | Status: COMPLETED | OUTPATIENT
Start: 2020-01-22 | End: 2020-01-22

## 2020-01-22 RX ORDER — HYDROMORPHONE HYDROCHLORIDE 2 MG/ML
0.25 INJECTION INTRAMUSCULAR; INTRAVENOUS; SUBCUTANEOUS
Refills: 0 | Status: DISCONTINUED | OUTPATIENT
Start: 2020-01-22 | End: 2020-01-22

## 2020-01-22 RX ADMIN — SODIUM CHLORIDE 25 MILLILITER(S): 9 INJECTION INTRAMUSCULAR; INTRAVENOUS; SUBCUTANEOUS at 09:14

## 2020-01-22 RX ADMIN — HYDROMORPHONE HYDROCHLORIDE 0.25 MILLIGRAM(S): 2 INJECTION INTRAMUSCULAR; INTRAVENOUS; SUBCUTANEOUS at 11:41

## 2020-01-22 RX ADMIN — SODIUM CHLORIDE 100 MILLILITER(S): 9 INJECTION INTRAMUSCULAR; INTRAVENOUS; SUBCUTANEOUS at 11:27

## 2020-01-22 RX ADMIN — HYDROMORPHONE HYDROCHLORIDE 0.25 MILLIGRAM(S): 2 INJECTION INTRAMUSCULAR; INTRAVENOUS; SUBCUTANEOUS at 12:09

## 2020-01-22 NOTE — BRIEF OPERATIVE NOTE - NSICDXBRIEFPROCEDURE_GEN_ALL_CORE_FT
PROCEDURES:  DVIU (direct vision internal urethrotomy) 22-Jan-2020 11:07:01  Luis A Longoria  Retrograde urethrogram 22-Jan-2020 11:06:46  Luis A Longoria

## 2020-01-22 NOTE — ASU DISCHARGE PLAN (ADULT/PEDIATRIC) - CARE PROVIDER_API CALL
Luis A Longoria)  Urology  [address] [facility], [address]  [place] [address]  Phone: [phone]  Fax: [phone]  Follow Up Time:

## 2020-01-22 NOTE — ASU DISCHARGE PLAN (ADULT/PEDIATRIC) - CALL YOUR DOCTOR IF YOU HAVE ANY OF THE FOLLOWING:
Increased irritability or sluggishness/Wound/Surgical Site with redness, or foul smelling discharge or pus/Excessive diarrhea/Numbness, tingling, color or temperature change to extremity/Inability to tolerate liquids or foods/Pain not relieved by Medications/Nausea and vomiting that does not stop/Unable to urinate/Bleeding that does not stop/Swelling that gets worse/Fever greater than (need to indicate Fahrenheit or Celsius)

## 2020-02-11 LAB
25(OH)D3 SERPL-MCNC: 42.7 NG/ML
ALBUMIN SERPL ELPH-MCNC: 4.4 G/DL
ALP BLD-CCNC: 57 U/L
ALT SERPL-CCNC: 20 U/L
ANION GAP SERPL CALC-SCNC: 12 MMOL/L
AST SERPL-CCNC: 17 U/L
BASOPHILS # BLD AUTO: 0.04 K/UL
BASOPHILS NFR BLD AUTO: 0.5 %
BILIRUB SERPL-MCNC: 0.4 MG/DL
BUN SERPL-MCNC: 13 MG/DL
CALCIUM SERPL-MCNC: 9.1 MG/DL
CHLORIDE SERPL-SCNC: 107 MMOL/L
CHOLEST SERPL-MCNC: 134 MG/DL
CHOLEST/HDLC SERPL: 2.4 RATIO
CK SERPL-CCNC: 102 U/L
CO2 SERPL-SCNC: 24 MMOL/L
CREAT SERPL-MCNC: 1.03 MG/DL
EOSINOPHIL # BLD AUTO: 0.14 K/UL
EOSINOPHIL NFR BLD AUTO: 1.8 %
ESTIMATED AVERAGE GLUCOSE: 114 MG/DL
GLUCOSE SERPL-MCNC: 108 MG/DL
HBA1C MFR BLD HPLC: 5.6 %
HCT VFR BLD CALC: 41.6 %
HDLC SERPL-MCNC: 55 MG/DL
HGB BLD-MCNC: 13.5 G/DL
IMM GRANULOCYTES NFR BLD AUTO: 1 %
LDLC SERPL CALC-MCNC: 65 MG/DL
LYMPHOCYTES # BLD AUTO: 0.9 K/UL
LYMPHOCYTES NFR BLD AUTO: 11.4 %
MAN DIFF?: NORMAL
MCHC RBC-ENTMCNC: 30.6 PG
MCHC RBC-ENTMCNC: 32.5 GM/DL
MCV RBC AUTO: 94.3 FL
MONOCYTES # BLD AUTO: 0.74 K/UL
MONOCYTES NFR BLD AUTO: 9.4 %
NEUTROPHILS # BLD AUTO: 5.98 K/UL
NEUTROPHILS NFR BLD AUTO: 75.9 %
PLATELET # BLD AUTO: 253 K/UL
POTASSIUM SERPL-SCNC: 4.8 MMOL/L
PROT SERPL-MCNC: 6.4 G/DL
PSA FREE FLD-MCNC: 23 %
PSA FREE SERPL-MCNC: 0.03 NG/ML
PSA SERPL-MCNC: 0.13 NG/ML
RBC # BLD: 4.41 M/UL
RBC # FLD: 15.8 %
SODIUM SERPL-SCNC: 142 MMOL/L
TRIGL SERPL-MCNC: 64 MG/DL
TSH SERPL-ACNC: 1.08 UIU/ML
WBC # FLD AUTO: 7.88 K/UL

## 2020-02-29 ENCOUNTER — RX RENEWAL (OUTPATIENT)
Age: 65
End: 2020-02-29

## 2020-04-23 ENCOUNTER — RX RENEWAL (OUTPATIENT)
Age: 65
End: 2020-04-23

## 2020-04-24 ENCOUNTER — TRANSCRIPTION ENCOUNTER (OUTPATIENT)
Age: 65
End: 2020-04-24

## 2020-05-12 ENCOUNTER — RX RENEWAL (OUTPATIENT)
Age: 65
End: 2020-05-12

## 2020-07-29 ENCOUNTER — APPOINTMENT (OUTPATIENT)
Dept: CARDIOLOGY | Facility: CLINIC | Age: 65
End: 2020-07-29
Payer: COMMERCIAL

## 2020-07-29 ENCOUNTER — NON-APPOINTMENT (OUTPATIENT)
Age: 65
End: 2020-07-29

## 2020-07-29 VITALS
BODY MASS INDEX: 27.77 KG/M2 | HEIGHT: 70 IN | HEART RATE: 75 BPM | WEIGHT: 194 LBS | SYSTOLIC BLOOD PRESSURE: 126 MMHG | OXYGEN SATURATION: 97 % | DIASTOLIC BLOOD PRESSURE: 70 MMHG

## 2020-07-29 VITALS
BODY MASS INDEX: 27.77 KG/M2 | HEIGHT: 70 IN | WEIGHT: 194 LBS | DIASTOLIC BLOOD PRESSURE: 70 MMHG | OXYGEN SATURATION: 97 % | HEART RATE: 75 BPM | SYSTOLIC BLOOD PRESSURE: 126 MMHG

## 2020-07-29 PROBLEM — M54.9 DORSALGIA, UNSPECIFIED: Chronic | Status: ACTIVE | Noted: 2020-01-14

## 2020-07-29 PROCEDURE — 93000 ELECTROCARDIOGRAM COMPLETE: CPT

## 2020-07-29 PROCEDURE — 99214 OFFICE O/P EST MOD 30 MIN: CPT

## 2020-07-29 RX ORDER — OMEPRAZOLE 40 MG/1
40 CAPSULE, DELAYED RELEASE ORAL TWICE DAILY
Refills: 0 | Status: ACTIVE | COMMUNITY

## 2020-07-29 NOTE — REVIEW OF SYSTEMS
[see HPI] : see HPI [Negative] : Endocrine [Feeling Fatigued] : not feeling fatigued [Shortness Of Breath] : no shortness of breath [Dyspnea on exertion] : not dyspnea during exertion [Chest  Pressure] : no chest pressure [Chest Pain] : no chest pain [Lower Ext Edema] : no extremity edema [Leg Claudication] : no intermittent leg claudication [Palpitations] : no palpitations [FreeTextEntry1] : Lower back issues as stated in HPI.

## 2020-07-29 NOTE — CARDIOLOGY SUMMARY
[No Ischemia] : no Ischemia [No Exercise Ind Arr] : no exercise induced arrhythmias [No Symptoms] : no Symptoms [LVEF ___%] : LVEF [unfilled]% [___] : [unfilled] [Enlarged] : enlarged LA size [Mild] : mild mitral regurgitation [None] : no pulmonary hypertension

## 2020-07-29 NOTE — PHYSICAL EXAM
[General Appearance - Well Developed] : well developed [Normal Appearance] : normal appearance [Well Groomed] : well groomed [General Appearance - Well Nourished] : well nourished [No Deformities] : no deformities [General Appearance - In No Acute Distress] : no acute distress [Normal Conjunctiva] : the conjunctiva exhibited no abnormalities [Eyelids - No Xanthelasma] : the eyelids demonstrated no xanthelasmas [Normal Oral Mucosa] : normal oral mucosa [No Oral Pallor] : no oral pallor [No Oral Cyanosis] : no oral cyanosis [Normal Jugular Venous A Waves Present] : normal jugular venous A waves present [No Jugular Venous Samaniego A Waves] : no jugular venous samaniego A waves [Normal Jugular Venous V Waves Present] : normal jugular venous V waves present [Respiration, Rhythm And Depth] : normal respiratory rhythm and effort [Exaggerated Use Of Accessory Muscles For Inspiration] : no accessory muscle use [Auscultation Breath Sounds / Voice Sounds] : lungs were clear to auscultation bilaterally [Abdomen Soft] : soft [Abdomen Tenderness] : non-tender [Abdomen Mass (___ Cm)] : no abdominal mass palpated [Gait - Sufficient For Exercise Testing] : the gait was sufficient for exercise testing [Abnormal Walk] : normal gait [Nail Clubbing] : no clubbing of the fingernails [Cyanosis, Localized] : no localized cyanosis [Petechial Hemorrhages (___cm)] : no petechial hemorrhages [Skin Color & Pigmentation] : normal skin color and pigmentation [] : no rash [No Venous Stasis] : no venous stasis [Skin Lesions] : no skin lesions [No Skin Ulcers] : no skin ulcer [No Xanthoma] : no  xanthoma was observed [Oriented To Time, Place, And Person] : oriented to person, place, and time [Affect] : the affect was normal [Mood] : the mood was normal [No Anxiety] : not feeling anxious [Normal Rate] : normal [Normal S1] : normal S1 [Normal S2] : normal S2 [I] : a grade 1 [2+] : left 2+ [1+] : left 1+ [No Abnormalities] : the abdominal aorta was not enlarged and no bruit was heard [No Pitting Edema] : no pitting edema present [S3] : no S3 [S4] : no S4 [Left Carotid Bruit] : no bruit heard over the left carotid [Right Carotid Bruit] : no bruit heard over the right carotid [Right Femoral Bruit] : no bruit heard over the right femoral artery [Left Femoral Bruit] : no bruit heard over the left femoral artery

## 2020-08-13 ENCOUNTER — RX RENEWAL (OUTPATIENT)
Age: 65
End: 2020-08-13

## 2020-10-29 ENCOUNTER — INPATIENT (INPATIENT)
Facility: HOSPITAL | Age: 65
LOS: 2 days | Discharge: HOME | DRG: 395 | End: 2020-11-01
Attending: COLON & RECTAL SURGERY | Admitting: COLON & RECTAL SURGERY
Payer: COMMERCIAL

## 2020-10-29 VITALS
WEIGHT: 184.97 LBS | OXYGEN SATURATION: 97 % | HEART RATE: 80 BPM | HEIGHT: 70 IN | RESPIRATION RATE: 18 BRPM | SYSTOLIC BLOOD PRESSURE: 123 MMHG | DIASTOLIC BLOOD PRESSURE: 71 MMHG | TEMPERATURE: 98 F

## 2020-10-29 DIAGNOSIS — I25.10 ATHEROSCLEROTIC HEART DISEASE OF NATIVE CORONARY ARTERY WITHOUT ANGINA PECTORIS: Chronic | ICD-10-CM

## 2020-10-29 DIAGNOSIS — Z98.890 OTHER SPECIFIED POSTPROCEDURAL STATES: Chronic | ICD-10-CM

## 2020-10-29 DIAGNOSIS — K37 UNSPECIFIED APPENDICITIS: ICD-10-CM

## 2020-10-29 LAB
ALBUMIN SERPL ELPH-MCNC: 4.1 G/DL — SIGNIFICANT CHANGE UP (ref 3.3–5)
ALP SERPL-CCNC: 60 U/L — SIGNIFICANT CHANGE UP (ref 40–120)
ALT FLD-CCNC: 18 U/L — SIGNIFICANT CHANGE UP (ref 10–45)
ANION GAP SERPL CALC-SCNC: 12 MMOL/L — SIGNIFICANT CHANGE UP (ref 5–17)
APPEARANCE UR: CLEAR — SIGNIFICANT CHANGE UP
AST SERPL-CCNC: 17 U/L — SIGNIFICANT CHANGE UP (ref 10–40)
BASE EXCESS BLDV CALC-SCNC: -0.1 MMOL/L — SIGNIFICANT CHANGE UP (ref -2–2)
BILIRUB SERPL-MCNC: 0.4 MG/DL — SIGNIFICANT CHANGE UP (ref 0.2–1.2)
BILIRUB UR-MCNC: NEGATIVE — SIGNIFICANT CHANGE UP
BLD GP AB SCN SERPL QL: NEGATIVE — SIGNIFICANT CHANGE UP
BUN SERPL-MCNC: 16 MG/DL — SIGNIFICANT CHANGE UP (ref 7–23)
CA-I SERPL-SCNC: 1.13 MMOL/L — SIGNIFICANT CHANGE UP (ref 1.12–1.3)
CALCIUM SERPL-MCNC: 9.1 MG/DL — SIGNIFICANT CHANGE UP (ref 8.4–10.5)
CHLORIDE BLDV-SCNC: 107 MMOL/L — SIGNIFICANT CHANGE UP (ref 96–108)
CHLORIDE SERPL-SCNC: 101 MMOL/L — SIGNIFICANT CHANGE UP (ref 96–108)
CO2 BLDV-SCNC: 26 MMOL/L — SIGNIFICANT CHANGE UP (ref 22–30)
CO2 SERPL-SCNC: 22 MMOL/L — SIGNIFICANT CHANGE UP (ref 22–31)
COLOR SPEC: SIGNIFICANT CHANGE UP
CREAT SERPL-MCNC: 0.83 MG/DL — SIGNIFICANT CHANGE UP (ref 0.5–1.3)
DIFF PNL FLD: NEGATIVE — SIGNIFICANT CHANGE UP
GAS PNL BLDV: 132 MMOL/L — LOW (ref 135–145)
GAS PNL BLDV: SIGNIFICANT CHANGE UP
GAS PNL BLDV: SIGNIFICANT CHANGE UP
GLUCOSE BLDV-MCNC: 85 MG/DL — SIGNIFICANT CHANGE UP (ref 70–99)
GLUCOSE SERPL-MCNC: 83 MG/DL — SIGNIFICANT CHANGE UP (ref 70–99)
GLUCOSE UR QL: NEGATIVE — SIGNIFICANT CHANGE UP
HCO3 BLDV-SCNC: 24 MMOL/L — SIGNIFICANT CHANGE UP (ref 21–29)
HCT VFR BLDA CALC: 39 % — SIGNIFICANT CHANGE UP (ref 39–50)
HGB BLD CALC-MCNC: 12.6 G/DL — LOW (ref 13–17)
KETONES UR-MCNC: NEGATIVE — SIGNIFICANT CHANGE UP
LACTATE BLDV-MCNC: 1 MMOL/L — SIGNIFICANT CHANGE UP (ref 0.7–2)
LEUKOCYTE ESTERASE UR-ACNC: NEGATIVE — SIGNIFICANT CHANGE UP
NITRITE UR-MCNC: NEGATIVE — SIGNIFICANT CHANGE UP
PCO2 BLDV: 41 MMHG — SIGNIFICANT CHANGE UP (ref 35–50)
PH BLDV: 7.39 — SIGNIFICANT CHANGE UP (ref 7.35–7.45)
PH UR: 6.5 — SIGNIFICANT CHANGE UP (ref 5–8)
PO2 BLDV: 29 MMHG — SIGNIFICANT CHANGE UP (ref 25–45)
POTASSIUM BLDV-SCNC: 3.8 MMOL/L — SIGNIFICANT CHANGE UP (ref 3.5–5.3)
POTASSIUM SERPL-MCNC: 4.1 MMOL/L — SIGNIFICANT CHANGE UP (ref 3.5–5.3)
POTASSIUM SERPL-SCNC: 4.1 MMOL/L — SIGNIFICANT CHANGE UP (ref 3.5–5.3)
PROT SERPL-MCNC: 6.6 G/DL — SIGNIFICANT CHANGE UP (ref 6–8.3)
PROT UR-MCNC: NEGATIVE — SIGNIFICANT CHANGE UP
RH IG SCN BLD-IMP: POSITIVE — SIGNIFICANT CHANGE UP
RH IG SCN BLD-IMP: POSITIVE — SIGNIFICANT CHANGE UP
SAO2 % BLDV: 53 % — LOW (ref 67–88)
SODIUM SERPL-SCNC: 135 MMOL/L — SIGNIFICANT CHANGE UP (ref 135–145)
SP GR SPEC: 1.02 — SIGNIFICANT CHANGE UP (ref 1.01–1.02)
UROBILINOGEN FLD QL: NEGATIVE — SIGNIFICANT CHANGE UP

## 2020-10-29 PROCEDURE — 71046 X-RAY EXAM CHEST 2 VIEWS: CPT | Mod: 26

## 2020-10-29 PROCEDURE — 99221 1ST HOSP IP/OBS SF/LOW 40: CPT | Mod: AI,GC

## 2020-10-29 PROCEDURE — 93010 ELECTROCARDIOGRAM REPORT: CPT

## 2020-10-29 PROCEDURE — 99285 EMERGENCY DEPT VISIT HI MDM: CPT

## 2020-10-29 RX ORDER — PIPERACILLIN AND TAZOBACTAM 4; .5 G/20ML; G/20ML
3.38 INJECTION, POWDER, LYOPHILIZED, FOR SOLUTION INTRAVENOUS EVERY 8 HOURS
Refills: 0 | Status: DISCONTINUED | OUTPATIENT
Start: 2020-10-29 | End: 2020-11-01

## 2020-10-29 RX ORDER — TAMSULOSIN HYDROCHLORIDE 0.4 MG/1
0.4 CAPSULE ORAL AT BEDTIME
Refills: 0 | Status: DISCONTINUED | OUTPATIENT
Start: 2020-10-29 | End: 2020-11-01

## 2020-10-29 RX ORDER — SODIUM CHLORIDE 9 MG/ML
1000 INJECTION INTRAMUSCULAR; INTRAVENOUS; SUBCUTANEOUS ONCE
Refills: 0 | Status: COMPLETED | OUTPATIENT
Start: 2020-10-29 | End: 2020-10-29

## 2020-10-29 RX ORDER — PANTOPRAZOLE SODIUM 20 MG/1
40 TABLET, DELAYED RELEASE ORAL
Refills: 0 | Status: DISCONTINUED | OUTPATIENT
Start: 2020-10-29 | End: 2020-11-01

## 2020-10-29 RX ORDER — ACETAMINOPHEN 500 MG
650 TABLET ORAL ONCE
Refills: 0 | Status: COMPLETED | OUTPATIENT
Start: 2020-10-29 | End: 2020-10-29

## 2020-10-29 RX ORDER — ENOXAPARIN SODIUM 100 MG/ML
40 INJECTION SUBCUTANEOUS DAILY
Refills: 0 | Status: DISCONTINUED | OUTPATIENT
Start: 2020-10-29 | End: 2020-11-01

## 2020-10-29 RX ORDER — SODIUM CHLORIDE 9 MG/ML
1000 INJECTION, SOLUTION INTRAVENOUS
Refills: 0 | Status: DISCONTINUED | OUTPATIENT
Start: 2020-10-29 | End: 2020-10-31

## 2020-10-29 RX ORDER — BUPROPION HYDROCHLORIDE 150 MG/1
150 TABLET, EXTENDED RELEASE ORAL DAILY
Refills: 0 | Status: DISCONTINUED | OUTPATIENT
Start: 2020-10-29 | End: 2020-11-01

## 2020-10-29 RX ORDER — PIPERACILLIN AND TAZOBACTAM 4; .5 G/20ML; G/20ML
3.38 INJECTION, POWDER, LYOPHILIZED, FOR SOLUTION INTRAVENOUS ONCE
Refills: 0 | Status: COMPLETED | OUTPATIENT
Start: 2020-10-29 | End: 2020-10-29

## 2020-10-29 RX ADMIN — SODIUM CHLORIDE 125 MILLILITER(S): 9 INJECTION, SOLUTION INTRAVENOUS at 23:42

## 2020-10-29 RX ADMIN — PIPERACILLIN AND TAZOBACTAM 200 GRAM(S): 4; .5 INJECTION, POWDER, LYOPHILIZED, FOR SOLUTION INTRAVENOUS at 20:28

## 2020-10-29 RX ADMIN — SODIUM CHLORIDE 1000 MILLILITER(S): 9 INJECTION INTRAMUSCULAR; INTRAVENOUS; SUBCUTANEOUS at 20:28

## 2020-10-29 NOTE — ED ADULT NURSE NOTE - OBJECTIVE STATEMENT
Patient is a 65 year old male coming to the ED via private car complaining of abdominal pain. A&Ox4 speaking in full sentences. Patient states he started having fever, chills, nausea, and pain in his abdomen on Tuesday. Patient stated he went to follow up with his GI doctor and they made him "go get a scan." Patient stated he brought the CD results from the scan to his GI doctor and they sent him here to the ED for r/o appendicitis. Patient reports that the pain started to diminish on Wednesday and he did not feel chills, fever, or nausea. Patient states the pain in his abdomen right now is a ".1" out of 10. Upon inspection patient's abdomen is soft, nondistended, but tender to palpation in his upper and lower left quadrant. LLQ > LUQ. Patient also reports he had a headache the last few days so he took extra strength Tylenol "about 4 or 5 pills." Patient states he took the last Tylenol around 3pm this afternoon. Bilateral lung sounds clear. Patient denies shortness of breath, chest pain, headache, N/V/D, urinary frequency/burning.

## 2020-10-29 NOTE — ED PROVIDER NOTE - GASTROINTESTINAL, MLM
Abdomen soft, non-tender, generalized tenderness with some rt low q tenderness Abdomen soft, non-tender, generalized tenderness with some  left  low q tenderness no tenderness on rt side

## 2020-10-29 NOTE — H&P ADULT - NSHPLABSRESULTS_GEN_ALL_CORE
LABS:    29 Oct 2020 20:36    135    |  101    |  16     ----------------------------<  83     4.1     |  22     |  0.83     Ca    9.1        29 Oct 2020 20:36    TPro  6.6    /  Alb  4.1    /  TBili  0.4    /  DBili  x      /  AST  17     /  ALT  18     /  AlkPhos  60     29 Oct 2020 20:36      CT scan uploaded, read not available  Impression acute non perforated appendicitis

## 2020-10-29 NOTE — H&P ADULT - HISTORY OF PRESENT ILLNESS
65 year old man with PMH cardiac stents in 2008, HTN, urethral stricture s/p DVIU (January 2020) presents with abdominal pain. He has had abdominal pain since yesterday morning. It was 1-2/10 in severity. He localized it to the LLQ. He spoke to his outpatient provider and got a CT scan. It was reportedly read as acute appendicitis. He was told to come to the ED. He had no nausea, no vomiting, no fevers, no chills.

## 2020-10-29 NOTE — ED PROVIDER NOTE - OBJECTIVE STATEMENT
65 y old male with a history of hypertension CAD status post 2  stents  2008  on Plavix and ASA ,came in complains of  crumply abdominal pain ,soft stool  nausea since Tuesday  ,seen today by PMD ,had outpatient CT  scan which revealed acute AP ,   primary care doctor dr ZACH Tellez 65 y old male with a history of hypertension CAD status post 2  stents  2008  on Plavix and ASA ,came in complains of  crumply abdominal pain ,soft stool  nausea since Tuesday had chills ,but no fever ,[facility] by [facility] ,had negative Covid   yesterday    ,seen today by PMD ,had outpatient CT  scan which revealed acute AP ,   primary care doctor dr ZACH Tellez

## 2020-10-29 NOTE — H&P ADULT - NSICDXFAMILYHX_GEN_ALL_CORE_FT
FAMILY HISTORY:  FH: breast cancer, Mother:   FH: heart attack, Father:   FH: lung cancer, Mother:

## 2020-10-29 NOTE — ED PROVIDER NOTE - CLINICAL SUMMARY MEDICAL DECISION MAKING FREE TEXT BOX
65 y old male with acute appendicitis ,admission labs ,covid ,antibiotics ,surgery cons ,admission [facility]

## 2020-10-29 NOTE — H&P ADULT - ATTENDING COMMENTS
Patient seen/examined.  Agree w above note and plan and have discussed plan w house staff.  Will treat non-operatively    Román Merrill MD

## 2020-10-29 NOTE — H&P ADULT - NSHPPHYSICALEXAM_GEN_ALL_CORE
Vital Signs Last 24 Hrs  T(C): 36.9 (29 Oct 2020 20:00), Max: 36.9 (29 Oct 2020 20:00)  T(F): 98.5 (29 Oct 2020 20:00), Max: 98.5 (29 Oct 2020 20:00)  HR: 68 (29 Oct 2020 20:00) (68 - 80)  BP: 122/71 (29 Oct 2020 20:00) (122/71 - 123/71)  BP(mean): --  RR: 18 (29 Oct 2020 20:00) (18 - 18)  SpO2: 99% (29 Oct 2020 20:00) (97% - 99%)    General: alert and oriented, NAD  Resp: airway patent, respirations unlabored  CVS: regular rate and rhythm  Abdomen: soft, non distended, minimally tender in b/l lower quadrants   Extremities: no edema  Skin: warm, dry, appropriate color

## 2020-10-29 NOTE — H&P ADULT - ASSESSMENT
65 year old man with mild abdominal pain and acute appendicitis, clinically stable    Plan:  -Admit to [facility], Dr. Hewitt  -added on to OR for lap possible open appendectomy  -NPO/IVF  -Zosyn  -f/u AM labs    -d/w Dr. Kash Lobo, R3  # [phone]

## 2020-10-29 NOTE — H&P ADULT - NSICDXPASTMEDICALHX_GEN_ALL_CORE_FT
PAST MEDICAL HISTORY:  Back pain     CAD S/P percutaneous coronary angioplasty     Heart attack     HTN (hypertension)

## 2020-10-30 LAB
ANION GAP SERPL CALC-SCNC: 10 MMOL/L — SIGNIFICANT CHANGE UP (ref 5–17)
APTT BLD: 27.6 SEC — SIGNIFICANT CHANGE UP (ref 27.5–35.5)
BLD GP AB SCN SERPL QL: NEGATIVE — SIGNIFICANT CHANGE UP
BUN SERPL-MCNC: 12 MG/DL — SIGNIFICANT CHANGE UP (ref 7–23)
CALCIUM SERPL-MCNC: 8.6 MG/DL — SIGNIFICANT CHANGE UP (ref 8.4–10.5)
CHLORIDE SERPL-SCNC: 105 MMOL/L — SIGNIFICANT CHANGE UP (ref 96–108)
CO2 SERPL-SCNC: 22 MMOL/L — SIGNIFICANT CHANGE UP (ref 22–31)
CREAT SERPL-MCNC: 0.83 MG/DL — SIGNIFICANT CHANGE UP (ref 0.5–1.3)
GLUCOSE SERPL-MCNC: 90 MG/DL — SIGNIFICANT CHANGE UP (ref 70–99)
HCT VFR BLD CALC: 35.7 % — LOW (ref 39–50)
HCV AB S/CO SERPL IA: 0.1 S/CO — SIGNIFICANT CHANGE UP (ref 0–0.99)
HCV AB SERPL-IMP: SIGNIFICANT CHANGE UP
HGB BLD-MCNC: 12 G/DL — LOW (ref 13–17)
INR BLD: 1.24 RATIO — HIGH (ref 0.88–1.16)
MAGNESIUM SERPL-MCNC: 1.9 MG/DL — SIGNIFICANT CHANGE UP (ref 1.6–2.6)
MCHC RBC-ENTMCNC: 30.9 PG — SIGNIFICANT CHANGE UP (ref 27–34)
MCHC RBC-ENTMCNC: 33.6 GM/DL — SIGNIFICANT CHANGE UP (ref 32–36)
MCV RBC AUTO: 92 FL — SIGNIFICANT CHANGE UP (ref 80–100)
NRBC # BLD: 0 /100 WBCS — SIGNIFICANT CHANGE UP (ref 0–0)
PHOSPHATE SERPL-MCNC: 2.6 MG/DL — SIGNIFICANT CHANGE UP (ref 2.5–4.5)
PLATELET # BLD AUTO: 181 K/UL — SIGNIFICANT CHANGE UP (ref 150–400)
POTASSIUM SERPL-MCNC: 4.4 MMOL/L — SIGNIFICANT CHANGE UP (ref 3.5–5.3)
POTASSIUM SERPL-SCNC: 4.4 MMOL/L — SIGNIFICANT CHANGE UP (ref 3.5–5.3)
PROTHROM AB SERPL-ACNC: 14.7 SEC — HIGH (ref 10.6–13.6)
RBC # BLD: 3.88 M/UL — LOW (ref 4.2–5.8)
RBC # FLD: 15.2 % — HIGH (ref 10.3–14.5)
RH IG SCN BLD-IMP: POSITIVE — SIGNIFICANT CHANGE UP
SARS-COV-2 IGG SERPL QL IA: POSITIVE
SARS-COV-2 IGM SERPL IA-ACNC: 5.58 INDEX — HIGH
SARS-COV-2 RNA SPEC QL NAA+PROBE: SIGNIFICANT CHANGE UP
SODIUM SERPL-SCNC: 137 MMOL/L — SIGNIFICANT CHANGE UP (ref 135–145)
WBC # BLD: 5.91 K/UL — SIGNIFICANT CHANGE UP (ref 3.8–10.5)
WBC # FLD AUTO: 5.91 K/UL — SIGNIFICANT CHANGE UP (ref 3.8–10.5)

## 2020-10-30 RX ORDER — ACETAMINOPHEN 500 MG
1000 TABLET ORAL ONCE
Refills: 0 | Status: COMPLETED | OUTPATIENT
Start: 2020-10-30 | End: 2020-10-30

## 2020-10-30 RX ORDER — HYDROMORPHONE HYDROCHLORIDE 2 MG/ML
0.5 INJECTION INTRAMUSCULAR; INTRAVENOUS; SUBCUTANEOUS EVERY 6 HOURS
Refills: 0 | Status: DISCONTINUED | OUTPATIENT
Start: 2020-10-30 | End: 2020-11-01

## 2020-10-30 RX ORDER — ACETAMINOPHEN 500 MG
975 TABLET ORAL EVERY 6 HOURS
Refills: 0 | Status: DISCONTINUED | OUTPATIENT
Start: 2020-10-30 | End: 2020-10-30

## 2020-10-30 RX ORDER — OXYCODONE HYDROCHLORIDE 5 MG/1
10 TABLET ORAL EVERY 6 HOURS
Refills: 0 | Status: DISCONTINUED | OUTPATIENT
Start: 2020-10-30 | End: 2020-10-30

## 2020-10-30 RX ORDER — OXYCODONE HYDROCHLORIDE 5 MG/1
5 TABLET ORAL EVERY 6 HOURS
Refills: 0 | Status: DISCONTINUED | OUTPATIENT
Start: 2020-10-30 | End: 2020-10-30

## 2020-10-30 RX ORDER — ACETAMINOPHEN 500 MG
1000 TABLET ORAL EVERY 6 HOURS
Refills: 0 | Status: COMPLETED | OUTPATIENT
Start: 2020-10-30 | End: 2020-10-31

## 2020-10-30 RX ORDER — HYDROMORPHONE HYDROCHLORIDE 2 MG/ML
1 INJECTION INTRAMUSCULAR; INTRAVENOUS; SUBCUTANEOUS EVERY 6 HOURS
Refills: 0 | Status: DISCONTINUED | OUTPATIENT
Start: 2020-10-30 | End: 2020-11-01

## 2020-10-30 RX ADMIN — PIPERACILLIN AND TAZOBACTAM 25 GRAM(S): 4; .5 INJECTION, POWDER, LYOPHILIZED, FOR SOLUTION INTRAVENOUS at 14:22

## 2020-10-30 RX ADMIN — PIPERACILLIN AND TAZOBACTAM 25 GRAM(S): 4; .5 INJECTION, POWDER, LYOPHILIZED, FOR SOLUTION INTRAVENOUS at 00:16

## 2020-10-30 RX ADMIN — PANTOPRAZOLE SODIUM 40 MILLIGRAM(S): 20 TABLET, DELAYED RELEASE ORAL at 05:15

## 2020-10-30 RX ADMIN — BUPROPION HYDROCHLORIDE 150 MILLIGRAM(S): 150 TABLET, EXTENDED RELEASE ORAL at 12:35

## 2020-10-30 RX ADMIN — SODIUM CHLORIDE 125 MILLILITER(S): 9 INJECTION, SOLUTION INTRAVENOUS at 12:35

## 2020-10-30 RX ADMIN — HYDROMORPHONE HYDROCHLORIDE 1 MILLIGRAM(S): 2 INJECTION INTRAMUSCULAR; INTRAVENOUS; SUBCUTANEOUS at 19:48

## 2020-10-30 RX ADMIN — TAMSULOSIN HYDROCHLORIDE 0.4 MILLIGRAM(S): 0.4 CAPSULE ORAL at 21:15

## 2020-10-30 RX ADMIN — ENOXAPARIN SODIUM 40 MILLIGRAM(S): 100 INJECTION SUBCUTANEOUS at 12:35

## 2020-10-30 RX ADMIN — SODIUM CHLORIDE 125 MILLILITER(S): 9 INJECTION, SOLUTION INTRAVENOUS at 15:57

## 2020-10-30 RX ADMIN — PIPERACILLIN AND TAZOBACTAM 25 GRAM(S): 4; .5 INJECTION, POWDER, LYOPHILIZED, FOR SOLUTION INTRAVENOUS at 21:15

## 2020-10-30 RX ADMIN — SODIUM CHLORIDE 125 MILLILITER(S): 9 INJECTION, SOLUTION INTRAVENOUS at 19:48

## 2020-10-30 RX ADMIN — Medication 400 MILLIGRAM(S): at 15:57

## 2020-10-30 RX ADMIN — HYDROMORPHONE HYDROCHLORIDE 1 MILLIGRAM(S): 2 INJECTION INTRAMUSCULAR; INTRAVENOUS; SUBCUTANEOUS at 20:19

## 2020-10-30 RX ADMIN — Medication 650 MILLIGRAM(S): at 00:17

## 2020-10-30 RX ADMIN — Medication 400 MILLIGRAM(S): at 08:04

## 2020-10-30 RX ADMIN — Medication 2.5 MILLIGRAM(S): at 05:58

## 2020-10-30 RX ADMIN — PIPERACILLIN AND TAZOBACTAM 25 GRAM(S): 4; .5 INJECTION, POWDER, LYOPHILIZED, FOR SOLUTION INTRAVENOUS at 05:15

## 2020-10-30 RX ADMIN — Medication 650 MILLIGRAM(S): at 00:47

## 2020-10-30 RX ADMIN — Medication 400 MILLIGRAM(S): at 23:56

## 2020-10-30 NOTE — PROGRESS NOTE ADULT - SUBJECTIVE AND OBJECTIVE BOX
SUBJECTIVE:   Pt seen and examined at bedside. No acute events overnight. Pt laying in bed. No nausea, vomiting, fever, chills. flatus/BM        Vital Signs Last 24 Hrs  T(C): 36.8 (29 Oct 2020 23:38), Max: 36.9 (29 Oct 2020 20:00)  T(F): 98.3 (29 Oct 2020 23:38), Max: 98.5 (29 Oct 2020 20:00)  HR: 72 (29 Oct 2020 23:38) (68 - 80)  BP: 125/76 (29 Oct 2020 23:38) (103/54 - 125/76)  BP(mean): --  RR: 18 (29 Oct 2020 23:38) (18 - 18)  SpO2: 97% (29 Oct 2020 23:38) (97% - 99%)      PHYSICAL EXAM:  Constitutional: NAD, laying in bed  Neuro: AAOx3  Respiratory: breathing comfortably  Gastrointestinal: Abdomen soft, non distended, mild TTP      I&O's Summary    29 Oct 2020 07:  -  30 Oct 2020 05:07  --------------------------------------------------------  IN: 850 mL / OUT: 850 mL / NET: 0 mL      I&O's Detail    29 Oct 2020 07:  -  30 Oct 2020 05:07  --------------------------------------------------------  IN:    IV PiggyBack: 100 mL    Lactated Ringers: 750 mL  Total IN: 850 mL    OUT:    Oral Fluid: 0 mL    Voided (mL): 850 mL  Total OUT: 850 mL    Total NET: 0 mL          MEDICATIONS  (STANDING):  buPROPion XL . 150 milliGRAM(s) Oral daily  enalapril 2.5 milliGRAM(s) Oral daily  enoxaparin Injectable 40 milliGRAM(s) SubCutaneous daily  lactated ringers. 1000 milliLiter(s) (125 mL/Hr) IV Continuous <Continuous>  pantoprazole    Tablet 40 milliGRAM(s) Oral before breakfast  piperacillin/tazobactam IVPB.. 3.375 Gram(s) IV Intermittent every 8 hours  tamsulosin 0.4 milliGRAM(s) Oral at bedtime    MEDICATIONS  (PRN):      LABS:                        12.0   5.91  )-----------( 181      ( 30 Oct 2020 01:41 )             35.7     10-30    137  |  105  |  12  ----------------------------<  90  4.4   |  22  |  0.83    Ca    8.6      30 Oct 2020 01:41  Phos  2.6     10-30  Mg     1.9     10-30    TPro  6.6  /  Alb  4.1  /  TBili  0.4  /  DBili  x   /  AST  17  /  ALT  18  /  AlkPhos  60  10-29    PT/INR - ( 30 Oct 2020 01:41 )   PT: 14.7 sec;   INR: 1.24 ratio         PTT - ( 30 Oct 2020 01:41 )  PTT:27.6 sec  Urinalysis Basic - ( 29 Oct 2020 22:45 )    Color: Light Yellow / Appearance: Clear / S.024 / pH: x  Gluc: x / Ketone: Negative  / Bili: Negative / Urobili: Negative   Blood: x / Protein: Negative / Nitrite: Negative   Leuk Esterase: Negative / RBC: x / WBC x   Sq Epi: x / Non Sq Epi: x / Bacteria: x        RADIOLOGY & ADDITIONAL STUDIES:

## 2020-10-30 NOTE — PROGRESS NOTE ADULT - ASSESSMENT
Pt is a 66 yo M with mild abdominal pain and acute appendicitis, clinically stable    Plan:  - added on to OR for lap possible open appendectomy  - NPO/IVF  - Zosyn  - f/u AM labs

## 2020-10-30 NOTE — PROGRESS NOTE ADULT - ATTENDING COMMENTS
Patient seen/examined.  Agree w above note and plan and have discussed plan w house staff.  Patient w at least 72 hrs abdominal pain and CT w extensive inflammatory changes.  Patient also on Plavix.  Will treat non-operatively - NPO, abx.  Decision on interval appendectomy to be made later date.  Above explained to patient and he understands plan.    Román Merrill MD

## 2020-10-30 NOTE — PATIENT PROFILE ADULT - NSTOBACCOQUITATTEMPT_GEN_A_CORE_SD
oral cessation medication/nicotine replacement therapy/tobacco cessation program/counseling/quit on own

## 2020-10-31 LAB
ANION GAP SERPL CALC-SCNC: 14 MMOL/L — SIGNIFICANT CHANGE UP (ref 5–17)
APPEARANCE UR: CLEAR — SIGNIFICANT CHANGE UP
BILIRUB UR-MCNC: NEGATIVE — SIGNIFICANT CHANGE UP
BUN SERPL-MCNC: 9 MG/DL — SIGNIFICANT CHANGE UP (ref 7–23)
CALCIUM SERPL-MCNC: 8.4 MG/DL — SIGNIFICANT CHANGE UP (ref 8.4–10.5)
CHLORIDE SERPL-SCNC: 105 MMOL/L — SIGNIFICANT CHANGE UP (ref 96–108)
CO2 SERPL-SCNC: 21 MMOL/L — LOW (ref 22–31)
COLOR SPEC: YELLOW — SIGNIFICANT CHANGE UP
CREAT SERPL-MCNC: 0.7 MG/DL — SIGNIFICANT CHANGE UP (ref 0.5–1.3)
DIFF PNL FLD: NEGATIVE — SIGNIFICANT CHANGE UP
GLUCOSE SERPL-MCNC: 53 MG/DL — LOW (ref 70–99)
GLUCOSE UR QL: NEGATIVE — SIGNIFICANT CHANGE UP
HCT VFR BLD CALC: 33.9 % — LOW (ref 39–50)
HGB BLD-MCNC: 11.4 G/DL — LOW (ref 13–17)
KETONES UR-MCNC: ABNORMAL
LEUKOCYTE ESTERASE UR-ACNC: NEGATIVE — SIGNIFICANT CHANGE UP
MAGNESIUM SERPL-MCNC: 1.9 MG/DL — SIGNIFICANT CHANGE UP (ref 1.6–2.6)
MCHC RBC-ENTMCNC: 30.8 PG — SIGNIFICANT CHANGE UP (ref 27–34)
MCHC RBC-ENTMCNC: 33.6 GM/DL — SIGNIFICANT CHANGE UP (ref 32–36)
MCV RBC AUTO: 91.6 FL — SIGNIFICANT CHANGE UP (ref 80–100)
NITRITE UR-MCNC: NEGATIVE — SIGNIFICANT CHANGE UP
NRBC # BLD: 0 /100 WBCS — SIGNIFICANT CHANGE UP (ref 0–0)
PH UR: 6 — SIGNIFICANT CHANGE UP (ref 5–8)
PHOSPHATE SERPL-MCNC: 3 MG/DL — SIGNIFICANT CHANGE UP (ref 2.5–4.5)
PLATELET # BLD AUTO: 187 K/UL — SIGNIFICANT CHANGE UP (ref 150–400)
POTASSIUM SERPL-MCNC: 4.1 MMOL/L — SIGNIFICANT CHANGE UP (ref 3.5–5.3)
POTASSIUM SERPL-SCNC: 4.1 MMOL/L — SIGNIFICANT CHANGE UP (ref 3.5–5.3)
PROT UR-MCNC: NEGATIVE — SIGNIFICANT CHANGE UP
RBC # BLD: 3.7 M/UL — LOW (ref 4.2–5.8)
RBC # FLD: 15 % — HIGH (ref 10.3–14.5)
SODIUM SERPL-SCNC: 140 MMOL/L — SIGNIFICANT CHANGE UP (ref 135–145)
SP GR SPEC: 1.01 — SIGNIFICANT CHANGE UP (ref 1.01–1.02)
UROBILINOGEN FLD QL: SIGNIFICANT CHANGE UP
WBC # BLD: 5.42 K/UL — SIGNIFICANT CHANGE UP (ref 3.8–10.5)
WBC # FLD AUTO: 5.42 K/UL — SIGNIFICANT CHANGE UP (ref 3.8–10.5)

## 2020-10-31 RX ADMIN — Medication 2.5 MILLIGRAM(S): at 05:43

## 2020-10-31 RX ADMIN — Medication 400 MILLIGRAM(S): at 05:44

## 2020-10-31 RX ADMIN — Medication 1000 MILLIGRAM(S): at 13:45

## 2020-10-31 RX ADMIN — Medication 400 MILLIGRAM(S): at 18:34

## 2020-10-31 RX ADMIN — PANTOPRAZOLE SODIUM 40 MILLIGRAM(S): 20 TABLET, DELAYED RELEASE ORAL at 05:45

## 2020-10-31 RX ADMIN — TAMSULOSIN HYDROCHLORIDE 0.4 MILLIGRAM(S): 0.4 CAPSULE ORAL at 21:07

## 2020-10-31 RX ADMIN — PIPERACILLIN AND TAZOBACTAM 25 GRAM(S): 4; .5 INJECTION, POWDER, LYOPHILIZED, FOR SOLUTION INTRAVENOUS at 21:08

## 2020-10-31 RX ADMIN — ENOXAPARIN SODIUM 40 MILLIGRAM(S): 100 INJECTION SUBCUTANEOUS at 13:16

## 2020-10-31 RX ADMIN — BUPROPION HYDROCHLORIDE 150 MILLIGRAM(S): 150 TABLET, EXTENDED RELEASE ORAL at 13:16

## 2020-10-31 RX ADMIN — Medication 400 MILLIGRAM(S): at 13:15

## 2020-10-31 RX ADMIN — Medication 1000 MILLIGRAM(S): at 00:26

## 2020-10-31 RX ADMIN — Medication 1000 MILLIGRAM(S): at 19:02

## 2020-10-31 RX ADMIN — Medication 1000 MILLIGRAM(S): at 06:14

## 2020-10-31 RX ADMIN — PIPERACILLIN AND TAZOBACTAM 25 GRAM(S): 4; .5 INJECTION, POWDER, LYOPHILIZED, FOR SOLUTION INTRAVENOUS at 13:17

## 2020-10-31 RX ADMIN — PIPERACILLIN AND TAZOBACTAM 25 GRAM(S): 4; .5 INJECTION, POWDER, LYOPHILIZED, FOR SOLUTION INTRAVENOUS at 05:44

## 2020-10-31 NOTE — PROGRESS NOTE ADULT - SUBJECTIVE AND OBJECTIVE BOX
SUBJECTIVE:   Pt seen and examined at bedside. No acute events overnight. Pt laying in bed. No nausea, vomiting, fever, chills. flatus/-BM        Vital Signs Last 24 Hrs  T(C): 36.6 (31 Oct 2020 05:42), Max: 36.8 (30 Oct 2020 11:01)  T(F): 97.8 (31 Oct 2020 05:42), Max: 98.3 (30 Oct 2020 11:01)  HR: 62 (31 Oct 2020 05:42) (49 - 66)  BP: 113/65 (31 Oct 2020 05:42) (113/65 - 136/69)  BP(mean): --  RR: 16 (31 Oct 2020 05:42) (16 - 18)  SpO2: 96% (31 Oct 2020 05:42) (95% - 97%)      PHYSICAL EXAM:  Constitutional: Patient well nourished, well developed  Neuro: AAOx3  Respiratory: breathing comfortably  Gastrointestinal: Abdomen soft, non distended, mild TTP        I&O's Summary    29 Oct 2020 07:  -  30 Oct 2020 07:00  --------------------------------------------------------  IN: 1200 mL / OUT: 850 mL / NET: 350 mL    30 Oct 2020 07:01  -  31 Oct 2020 06:01  --------------------------------------------------------  IN: 1800 mL / OUT: 1100 mL / NET: 700 mL      I&O's Detail    29 Oct 2020 07:  -  30 Oct 2020 07:00  --------------------------------------------------------  IN:    IV PiggyBack: 200 mL    Lactated Ringers: 1000 mL  Total IN: 1200 mL    OUT:    Oral Fluid: 0 mL    Voided (mL): 850 mL  Total OUT: 850 mL    Total NET: 350 mL      30 Oct 2020 07:01  -  31 Oct 2020 06:01  --------------------------------------------------------  IN:    IV PiggyBack: 200 mL    IV PiggyBack: 100 mL    Lactated Ringers: 1500 mL  Total IN: 1800 mL    OUT:    Oral Fluid: 0 mL    Voided (mL): 1100 mL  Total OUT: 1100 mL    Total NET: 700 mL          MEDICATIONS  (STANDING):  acetaminophen  IVPB .. 1000 milliGRAM(s) IV Intermittent every 6 hours  buPROPion XL . 150 milliGRAM(s) Oral daily  enalapril 2.5 milliGRAM(s) Oral daily  enoxaparin Injectable 40 milliGRAM(s) SubCutaneous daily  lactated ringers. 1000 milliLiter(s) (125 mL/Hr) IV Continuous <Continuous>  pantoprazole    Tablet 40 milliGRAM(s) Oral before breakfast  piperacillin/tazobactam IVPB.. 3.375 Gram(s) IV Intermittent every 8 hours  tamsulosin 0.4 milliGRAM(s) Oral at bedtime    MEDICATIONS  (PRN):  HYDROmorphone  Injectable 1 milliGRAM(s) IV Push every 6 hours PRN Severe Pain (7 - 10)  HYDROmorphone  Injectable 0.5 milliGRAM(s) IV Push every 6 hours PRN Moderate Pain (4 - 6)      LABS:                        12.0   5.91  )-----------( 181      ( 30 Oct 2020 01:41 )             35.7     10-30    137  |  105  |  12  ----------------------------<  90  4.4   |  22  |  0.83    Ca    8.6      30 Oct 2020 01:41  Phos  2.6     10-30  Mg     1.9     10-30    TPro  6.6  /  Alb  4.1  /  TBili  0.4  /  DBili  x   /  AST  17  /  ALT  18  /  AlkPhos  60  10-29    PT/INR - ( 30 Oct 2020 01:41 )   PT: 14.7 sec;   INR: 1.24 ratio         PTT - ( 30 Oct 2020 01:41 )  PTT:27.6 sec  Urinalysis Basic - ( 29 Oct 2020 22:45 )    Color: Light Yellow / Appearance: Clear / S.024 / pH: x  Gluc: x / Ketone: Negative  / Bili: Negative / Urobili: Negative   Blood: x / Protein: Negative / Nitrite: Negative   Leuk Esterase: Negative / RBC: x / WBC x   Sq Epi: x / Non Sq Epi: x / Bacteria: x        RADIOLOGY & ADDITIONAL STUDIES:

## 2020-10-31 NOTE — PROGRESS NOTE ADULT - ATTENDING COMMENTS
Surgery Attending    Pt seen and examined; agree with above assessment and plan    Remains afebrile w nl WBC  Much less pain  +flatus, tolerating PO fluids    Continue anti'bx, advance diet as tolerated  Probable d/c tomorrow on PO anti'bx for total of 2 weeks of therapy

## 2020-10-31 NOTE — PROGRESS NOTE ADULT - ASSESSMENT
Pt is a 64 yo M with mild abdominal pain and acute appendicitis, clinically stable    Plan:  - managed non-op, plan for interval appy at later date  - NPO  - IVF  - Zosyn  - f/u AM labs

## 2020-11-01 ENCOUNTER — TRANSCRIPTION ENCOUNTER (OUTPATIENT)
Age: 65
End: 2020-11-01

## 2020-11-01 VITALS
RESPIRATION RATE: 18 BRPM | DIASTOLIC BLOOD PRESSURE: 54 MMHG | HEART RATE: 63 BPM | TEMPERATURE: 98 F | OXYGEN SATURATION: 97 % | SYSTOLIC BLOOD PRESSURE: 112 MMHG

## 2020-11-01 LAB
ANION GAP SERPL CALC-SCNC: 9 MMOL/L — SIGNIFICANT CHANGE UP (ref 5–17)
BUN SERPL-MCNC: 8 MG/DL — SIGNIFICANT CHANGE UP (ref 7–23)
CALCIUM SERPL-MCNC: 8.8 MG/DL — SIGNIFICANT CHANGE UP (ref 8.4–10.5)
CHLORIDE SERPL-SCNC: 107 MMOL/L — SIGNIFICANT CHANGE UP (ref 96–108)
CO2 SERPL-SCNC: 25 MMOL/L — SIGNIFICANT CHANGE UP (ref 22–31)
CREAT SERPL-MCNC: 0.76 MG/DL — SIGNIFICANT CHANGE UP (ref 0.5–1.3)
GLUCOSE SERPL-MCNC: 100 MG/DL — HIGH (ref 70–99)
HCT VFR BLD CALC: 36.7 % — LOW (ref 39–50)
HGB BLD-MCNC: 12.1 G/DL — LOW (ref 13–17)
MAGNESIUM SERPL-MCNC: 2 MG/DL — SIGNIFICANT CHANGE UP (ref 1.6–2.6)
MCHC RBC-ENTMCNC: 30.3 PG — SIGNIFICANT CHANGE UP (ref 27–34)
MCHC RBC-ENTMCNC: 33 GM/DL — SIGNIFICANT CHANGE UP (ref 32–36)
MCV RBC AUTO: 92 FL — SIGNIFICANT CHANGE UP (ref 80–100)
NRBC # BLD: 0 /100 WBCS — SIGNIFICANT CHANGE UP (ref 0–0)
PHOSPHATE SERPL-MCNC: 2.6 MG/DL — SIGNIFICANT CHANGE UP (ref 2.5–4.5)
PLATELET # BLD AUTO: 197 K/UL — SIGNIFICANT CHANGE UP (ref 150–400)
POTASSIUM SERPL-MCNC: 4.4 MMOL/L — SIGNIFICANT CHANGE UP (ref 3.5–5.3)
POTASSIUM SERPL-SCNC: 4.4 MMOL/L — SIGNIFICANT CHANGE UP (ref 3.5–5.3)
RBC # BLD: 3.99 M/UL — LOW (ref 4.2–5.8)
RBC # FLD: 14.7 % — HIGH (ref 10.3–14.5)
SODIUM SERPL-SCNC: 141 MMOL/L — SIGNIFICANT CHANGE UP (ref 135–145)
WBC # BLD: 4.57 K/UL — SIGNIFICANT CHANGE UP (ref 3.8–10.5)
WBC # FLD AUTO: 4.57 K/UL — SIGNIFICANT CHANGE UP (ref 3.8–10.5)

## 2020-11-01 PROCEDURE — 80053 COMPREHEN METABOLIC PANEL: CPT

## 2020-11-01 PROCEDURE — 83735 ASSAY OF MAGNESIUM: CPT

## 2020-11-01 PROCEDURE — 85027 COMPLETE CBC AUTOMATED: CPT

## 2020-11-01 PROCEDURE — 82947 ASSAY GLUCOSE BLOOD QUANT: CPT

## 2020-11-01 PROCEDURE — 86901 BLOOD TYPING SEROLOGIC RH(D): CPT

## 2020-11-01 PROCEDURE — 96374 THER/PROPH/DIAG INJ IV PUSH: CPT

## 2020-11-01 PROCEDURE — U0003: CPT

## 2020-11-01 PROCEDURE — 85730 THROMBOPLASTIN TIME PARTIAL: CPT

## 2020-11-01 PROCEDURE — 81003 URINALYSIS AUTO W/O SCOPE: CPT

## 2020-11-01 PROCEDURE — 83605 ASSAY OF LACTIC ACID: CPT

## 2020-11-01 PROCEDURE — 86769 SARS-COV-2 COVID-19 ANTIBODY: CPT

## 2020-11-01 PROCEDURE — 71046 X-RAY EXAM CHEST 2 VIEWS: CPT

## 2020-11-01 PROCEDURE — 84100 ASSAY OF PHOSPHORUS: CPT

## 2020-11-01 PROCEDURE — 85018 HEMOGLOBIN: CPT

## 2020-11-01 PROCEDURE — 82330 ASSAY OF CALCIUM: CPT

## 2020-11-01 PROCEDURE — 82435 ASSAY OF BLOOD CHLORIDE: CPT

## 2020-11-01 PROCEDURE — 86900 BLOOD TYPING SEROLOGIC ABO: CPT

## 2020-11-01 PROCEDURE — 99285 EMERGENCY DEPT VISIT HI MDM: CPT | Mod: 25

## 2020-11-01 PROCEDURE — 84295 ASSAY OF SERUM SODIUM: CPT

## 2020-11-01 PROCEDURE — 86850 RBC ANTIBODY SCREEN: CPT

## 2020-11-01 PROCEDURE — 82803 BLOOD GASES ANY COMBINATION: CPT

## 2020-11-01 PROCEDURE — 85014 HEMATOCRIT: CPT

## 2020-11-01 PROCEDURE — 93005 ELECTROCARDIOGRAM TRACING: CPT

## 2020-11-01 PROCEDURE — 80048 BASIC METABOLIC PNL TOTAL CA: CPT

## 2020-11-01 PROCEDURE — 86803 HEPATITIS C AB TEST: CPT

## 2020-11-01 PROCEDURE — 85610 PROTHROMBIN TIME: CPT

## 2020-11-01 PROCEDURE — 84132 ASSAY OF SERUM POTASSIUM: CPT

## 2020-11-01 PROCEDURE — 83690 ASSAY OF LIPASE: CPT

## 2020-11-01 RX ADMIN — Medication 2.5 MILLIGRAM(S): at 05:23

## 2020-11-01 RX ADMIN — PIPERACILLIN AND TAZOBACTAM 25 GRAM(S): 4; .5 INJECTION, POWDER, LYOPHILIZED, FOR SOLUTION INTRAVENOUS at 05:23

## 2020-11-01 RX ADMIN — PANTOPRAZOLE SODIUM 40 MILLIGRAM(S): 20 TABLET, DELAYED RELEASE ORAL at 05:23

## 2020-11-01 NOTE — DISCHARGE NOTE PROVIDER - NSDCCPCAREPLAN_GEN_ALL_CORE_FT
PRINCIPAL DISCHARGE DIAGNOSIS  Diagnosis: Appendicitis  Assessment and Plan of Treatment: ACTIVITY: No heavy lifting or straining. Otherwise, you may return to your usual level of physical activity.  DIET: Return to your usual diet.  NOTIFY YOUR SURGEON IF: You have any bleeding that does not stop, any fever (over 100.4 F) or chills, persistent nausea/vomiting, persistent diarrhea, or if your pain is not controlled.  FOLLOW-UP:  1. Follow-up with Dr. Hewitt within 1-2 weeks of discharge.  Please call office for appointment  2. Please follow up with your primary care physician in one week regarding your hospitalization.

## 2020-11-01 NOTE — PROGRESS NOTE ADULT - ASSESSMENT
Pt is a 64 yo M with mild abdominal pain and acute appendicitis, clinically stable, managed non-op.    Plan:  - regular diet  - Zosyn  - f/u AM labs  - plan for interval appy at later date  - dc home with PO abx

## 2020-11-01 NOTE — PROGRESS NOTE ADULT - SUBJECTIVE AND OBJECTIVE BOX
SUBJECTIVE:   Pt seen and examined at bedside. No acute events overnight. Pt laying in bed comfortably. Pain has improved. Pt diet was advanced yesterday. Tolerating well. No nausea, vomiting, fever, chills. +flatus/-BM        Vital Signs Last 24 Hrs  T(C): 36.6 (2020 00:33), Max: 36.7 (31 Oct 2020 14:19)  T(F): 97.9 (2020 00:33), Max: 98 (31 Oct 2020 14:19)  HR: 49 (2020 00:33) (48 - 62)  BP: 115/69 (2020 00:33) (106/62 - 115/69)  BP(mean): --  RR: 18 (:33) (16 - 18)  SpO2: 97% (2020 00:33) (96% - 98%)      PHYSICAL EXAM:  Constitutional: Patient well nourished, well developed  Neuro: AAOx3  Respiratory: breathing comfortably  Gastrointestinal: Abdomen soft, non distended, nontender      I&O's Summary    30 Oct 2020 07:  -  31 Oct 2020 07:00  --------------------------------------------------------  IN: 3300 mL / OUT: 1100 mL / NET: 2200 mL    31 Oct 2020 08:  -  2020 02:54  --------------------------------------------------------  IN: 1540 mL / OUT: 2700 mL / NET: -1160 mL      I&O's Detail    30 Oct 2020 07:  -  31 Oct 2020 07:00  --------------------------------------------------------  IN:    IV PiggyBack: 200 mL    IV PiggyBack: 100 mL    Lactated Ringers: 3000 mL  Total IN: 3300 mL    OUT:    Oral Fluid: 0 mL    Voided (mL): 1100 mL  Total OUT: 1100 mL    Total NET: 2200 mL      31 Oct 2020 08:01  -  2020 02:54  --------------------------------------------------------  IN:    IV PiggyBack: 100 mL    IV PiggyBack: 100 mL    Oral Fluid: 1340 mL  Total IN: 1540 mL    OUT:    Voided (mL): 2700 mL  Total OUT: 2700 mL    Total NET: -1160 mL          MEDICATIONS  (STANDING):  buPROPion XL . 150 milliGRAM(s) Oral daily  enalapril 2.5 milliGRAM(s) Oral daily  enoxaparin Injectable 40 milliGRAM(s) SubCutaneous daily  pantoprazole    Tablet 40 milliGRAM(s) Oral before breakfast  piperacillin/tazobactam IVPB.. 3.375 Gram(s) IV Intermittent every 8 hours  tamsulosin 0.4 milliGRAM(s) Oral at bedtime    MEDICATIONS  (PRN):  HYDROmorphone  Injectable 1 milliGRAM(s) IV Push every 6 hours PRN Severe Pain (7 - 10)  HYDROmorphone  Injectable 0.5 milliGRAM(s) IV Push every 6 hours PRN Moderate Pain (4 - 6)      LABS:                        11.4   5.42  )-----------( 187      ( 31 Oct 2020 07:40 )             33.9     10-31    140  |  105  |  9   ----------------------------<  53<L>  4.1   |  21<L>  |  0.70    Ca    8.4      31 Oct 2020 07:38  Phos  3.0     10-31  Mg     1.9     10-31        Urinalysis Basic - ( 31 Oct 2020 14:34 )    Color: Yellow / Appearance: Clear / S.013 / pH: x  Gluc: x / Ketone: Moderate  / Bili: Negative / Urobili: <2 mg/dL   Blood: x / Protein: Negative / Nitrite: Negative   Leuk Esterase: Negative / RBC: x / WBC x   Sq Epi: x / Non Sq Epi: x / Bacteria: x        RADIOLOGY & ADDITIONAL STUDIES:

## 2020-11-01 NOTE — DISCHARGE NOTE PROVIDER - HOSPITAL COURSE
65 year old man with PMH cardiac stents in 2008, HTN, urethral stricture s/p DVIU (January 2020) presents with abdominal pain. He has had abdominal pain since yesterday morning. It was 1-2/10 in severity. He localized it to the LLQ. He spoke to his outpatient provider and got a CT scan read as acute appendicitis. He was told to come to the ED. He had no nausea, no vomiting, no fevers, no chills.     Patient was made NPO and on IVF. He was started on IV Zosyn and his pain was well controlled. He was hemodynamically stable, was voiding, and having bowel function. His abdominal pain continued to improve.    His diet was advanced as tolerated. At the time of discharge, the patient was hemodynamically stable, was tolerating PO diet, was voiding urine and passing stool, was ambulating, and was comfortable with adequate pain control. The patient was instructed to follow up with Dr. Hewitt within 1-2 weeks after discharge from the hospital. The patient felt comfortable with discharge. The patient was discharged to home. The patient had no other issues.

## 2020-11-01 NOTE — DISCHARGE NOTE PROVIDER - NSDCMRMEDTOKEN_GEN_ALL_CORE_FT
alfuzosin 10 mg oral tablet, extended release: 1 tab(s) orally once a day  aspirin 81 mg oral tablet: 1 tab(s) orally once a day  Augmentin 875 mg-125 mg oral tablet: 1 tab(s) orally 2 times a day   Cialis 5 mg oral tablet: 1 tab(s) orally once a day  clopidogrel 75 mg oral tablet: 1 tab(s) orally once a day  enalapril 2.5 mg oral tablet: 1 tab(s) orally once a day  omeprazole 40 mg oral delayed release capsule: 1 cap(s) orally once a day  Alonzo-E: 400 mg daily  simvastatin 40 mg oral tablet: 1 tab(s) orally once a day (at bedtime)  Vitamin D3 1000 intl units oral capsule: 1 cap(s) orally once a day  Wellbutrin  mg/12 hours oral tablet, extended release: 1 tab(s) orally 2 times a day

## 2020-11-01 NOTE — DISCHARGE NOTE PROVIDER - NSDCFUADDINST_GEN_ALL_CORE_FT
Please take your Augmentin antibiotic for 11days after your discharge. You can resume your Plavix dose today.

## 2020-11-01 NOTE — PROGRESS NOTE ADULT - ATTENDING COMMENTS
I have seen and examined the patient.  I agree with the surgical resident's note.  Home with 10 days of PO abx    Amol Russell contact information [phone]

## 2020-11-01 NOTE — DISCHARGE NOTE NURSING/CASE MANAGEMENT/SOCIAL WORK - PATIENT PORTAL LINK FT
You can access the FollowMyHealth Patient Portal offered by [facility] by registering at the following website: [email or web address]. By joining [facility]’s FollowMyHealth portal, you will also be able to view your health information using other applications (apps) compatible with our system.

## 2020-11-19 ENCOUNTER — APPOINTMENT (OUTPATIENT)
Dept: SURGERY | Facility: CLINIC | Age: 65
End: 2020-11-19
Payer: COMMERCIAL

## 2020-11-19 VITALS
TEMPERATURE: 97.4 F | HEIGHT: 70 IN | RESPIRATION RATE: 16 BRPM | WEIGHT: 190 LBS | OXYGEN SATURATION: 100 % | BODY MASS INDEX: 27.2 KG/M2 | HEART RATE: 64 BPM

## 2020-11-19 PROCEDURE — 99214 OFFICE O/P EST MOD 30 MIN: CPT

## 2020-11-19 NOTE — CONSULT LETTER
[Dear  ___] : Dear  [unfilled], [Consult Letter:] : I had the pleasure of evaluating your patient, [unfilled]. [Please see my note below.] : Please see my note below. [Sincerely,] : Sincerely, [FreeTextEntry3] : Román Painting MD, FACS\par [facility]\par [facility]\par [address]\par [place]  [address]\par Tel: [phone]\par Email: [email or web address] \par  [DrRemy  ___] : Dr. SLAUGHTER

## 2020-11-19 NOTE — PHYSICAL EXAM
[Normal Breath Sounds] : Normal breath sounds [Normal Heart Sounds] : normal heart sounds [No Rash or Lesion] : No rash or lesion [Calm] : calm [de-identified] : Well-developed well-nourished [de-identified] : Sclera clear [de-identified] : Supple [de-identified] : Soft nontender and nondistended.  There are no masses. [de-identified] : No clubbing cyanosis or edema [de-identified] : Cranial nerves II through XII grossly intact

## 2020-11-19 NOTE — HISTORY OF PRESENT ILLNESS
[de-identified] : 65-year-old gentleman seen at [facility] on October 29 diagnosed with nonsurgical appendicitis.  He was treated with intravenous antibiotics till his pain improved and then was sent home on a 2-week course of antibiotics by mouth.  Today in the office he complains only of gassiness denies pain nausea emesis or change in bowel habits.

## 2020-11-19 NOTE — ASSESSMENT
[FreeTextEntry1] : This patient suffered from nonsurgical appendicitis.  I will schedule a CAT scan approximately 4 to 6 weeks after his initial presentation.  We will thereafter offer him an interval appendectomy.\par \par Please advise me on the safety of discontinuing his Plavix 5 days preoperatively.

## 2020-12-08 ENCOUNTER — NON-APPOINTMENT (OUTPATIENT)
Age: 65
End: 2020-12-08

## 2020-12-08 ENCOUNTER — OUTPATIENT (OUTPATIENT)
Dept: OUTPATIENT SERVICES | Facility: HOSPITAL | Age: 65
LOS: 1 days | End: 2020-12-08
Payer: COMMERCIAL

## 2020-12-08 ENCOUNTER — APPOINTMENT (OUTPATIENT)
Dept: CARDIOLOGY | Facility: CLINIC | Age: 65
End: 2020-12-08
Payer: COMMERCIAL

## 2020-12-08 ENCOUNTER — APPOINTMENT (OUTPATIENT)
Dept: CT IMAGING | Facility: CLINIC | Age: 65
End: 2020-12-08

## 2020-12-08 ENCOUNTER — RESULT REVIEW (OUTPATIENT)
Age: 65
End: 2020-12-08

## 2020-12-08 VITALS
TEMPERATURE: 98.6 F | OXYGEN SATURATION: 98 % | HEART RATE: 80 BPM | DIASTOLIC BLOOD PRESSURE: 72 MMHG | RESPIRATION RATE: 16 BRPM | BODY MASS INDEX: 26.77 KG/M2 | HEIGHT: 70 IN | WEIGHT: 187 LBS | SYSTOLIC BLOOD PRESSURE: 145 MMHG

## 2020-12-08 DIAGNOSIS — I25.10 ATHEROSCLEROTIC HEART DISEASE OF NATIVE CORONARY ARTERY WITHOUT ANGINA PECTORIS: Chronic | ICD-10-CM

## 2020-12-08 DIAGNOSIS — I10 ESSENTIAL (PRIMARY) HYPERTENSION: ICD-10-CM

## 2020-12-08 DIAGNOSIS — Z98.890 OTHER SPECIFIED POSTPROCEDURAL STATES: Chronic | ICD-10-CM

## 2020-12-08 DIAGNOSIS — Z00.8 ENCOUNTER FOR OTHER GENERAL EXAMINATION: ICD-10-CM

## 2020-12-08 DIAGNOSIS — I25.10 ATHEROSCLEROTIC HEART DISEASE OF NATIVE CORONARY ARTERY W/OUT ANGINA PECTORIS: ICD-10-CM

## 2020-12-08 DIAGNOSIS — Z01.810 ENCOUNTER FOR PREPROCEDURAL CARDIOVASCULAR EXAMINATION: ICD-10-CM

## 2020-12-08 PROCEDURE — 93000 ELECTROCARDIOGRAM COMPLETE: CPT | Mod: NC

## 2020-12-08 PROCEDURE — 74177 CT ABD & PELVIS W/CONTRAST: CPT

## 2020-12-08 PROCEDURE — 99214 OFFICE O/P EST MOD 30 MIN: CPT

## 2020-12-08 PROCEDURE — 99072 ADDL SUPL MATRL&STAF TM PHE: CPT

## 2020-12-08 PROCEDURE — 82565 ASSAY OF CREATININE: CPT

## 2020-12-08 PROCEDURE — 74177 CT ABD & PELVIS W/CONTRAST: CPT | Mod: 26

## 2020-12-08 NOTE — HISTORY OF PRESENT ILLNESS
[FreeTextEntry1] : Millie Silva is a 65 year old man with a history of coronary artery disease, acute inferior wall myocardial infarction treated with drug-eluting stents to the right coronary artery (2007), hypertension, hyperlipidemia, tobacco use, depression, and esophageal reflux who presents for cardiology evaluation prior to an upcoming surgical procedure - his last visit was with my associate, Dr. Lazaro, in July. \par \par He was hospitalized October 29, 2020 -  November 1, 2020 with acute appendicitis and was treated with a course of antibiotics and is now scheduled for elective appendectomy.\par \par He has been doing well from a cardiovascular standpoint and describes a good baseline functional status. He has not been experiencing angina or dyspnea. He describes mild abdominal discomfort and bloating.

## 2020-12-08 NOTE — DISCUSSION/SUMMARY
[FreeTextEntry1] : \par Preoperative cardiac examination: Mr. Silva appears stable from a cardiac standpoint to proceed with elective appendectomy.  It is reasonable to temporarily discontinue clopidogrel for 7 days prior to surgery; however, I recommend substituting aspirin 81 mg daily (while off of clopidogrel) due to the presence of coronary artery disease, past myocardial infarction, and drug eluting stents in the right coronary artery.  I also advised him to discontinue over-the-counter medications for one week prior to surgery. Postoperatively he should discontinue aspirin and resume clopidogrel when feasible from a surgical standpoint.\par \par Hypertension: Fair control --moderately elevated during today's visit but lower during other encounters as documented in EMR; continue enalapril.

## 2020-12-08 NOTE — REVIEW OF SYSTEMS
[Fever] : no fever [Chills] : no chills [Shortness Of Breath] : no shortness of breath [Chest  Pressure] : no chest pressure [Chest Pain] : no chest pain [see HPI] : see HPI [Abdominal Pain] : abdominal pain [Depression] : depression

## 2020-12-08 NOTE — CARDIOLOGY SUMMARY
[No Ischemia] : no Ischemia [No Exercise Ind Arr] : no exercise induced arrhythmias [No Symptoms] : no Symptoms [LVEF ___%] : LVEF [unfilled]% [None] : no pulmonary hypertension [Enlarged] : enlarged LA size [Mild] : mild mitral regurgitation [___] : [unfilled]

## 2020-12-08 NOTE — PHYSICAL EXAM
[Well Groomed] : well groomed [General Appearance - In No Acute Distress] : no acute distress [Eyelids - No Xanthelasma] : the eyelids demonstrated no xanthelasmas [FreeTextEntry1] : wearing a face mask [Respiration, Rhythm And Depth] : normal respiratory rhythm and effort [Auscultation Breath Sounds / Voice Sounds] : lungs were clear to auscultation bilaterally [Heart Rate And Rhythm] : heart rate and rhythm were normal [Heart Sounds] : normal S1 and S2 [Edema] : no peripheral edema present [Bowel Sounds] : normal bowel sounds [Abnormal Walk] : normal gait [Oriented To Time, Place, And Person] : oriented to person, place, and time [Impaired Insight] : insight and judgment were intact [Affect] : the affect was normal [Mood] : the mood was normal

## 2020-12-09 ENCOUNTER — OUTPATIENT (OUTPATIENT)
Dept: OUTPATIENT SERVICES | Facility: HOSPITAL | Age: 65
LOS: 1 days | End: 2020-12-09
Payer: SELF-PAY

## 2020-12-09 VITALS
SYSTOLIC BLOOD PRESSURE: 120 MMHG | OXYGEN SATURATION: 97 % | DIASTOLIC BLOOD PRESSURE: 76 MMHG | HEART RATE: 87 BPM | WEIGHT: 184.97 LBS | HEIGHT: 70 IN | RESPIRATION RATE: 20 BRPM | TEMPERATURE: 98 F

## 2020-12-09 DIAGNOSIS — K35.80 UNSPECIFIED ACUTE APPENDICITIS: ICD-10-CM

## 2020-12-09 DIAGNOSIS — Z98.890 OTHER SPECIFIED POSTPROCEDURAL STATES: Chronic | ICD-10-CM

## 2020-12-09 DIAGNOSIS — I25.10 ATHEROSCLEROTIC HEART DISEASE OF NATIVE CORONARY ARTERY WITHOUT ANGINA PECTORIS: ICD-10-CM

## 2020-12-09 DIAGNOSIS — I25.10 ATHEROSCLEROTIC HEART DISEASE OF NATIVE CORONARY ARTERY WITHOUT ANGINA PECTORIS: Chronic | ICD-10-CM

## 2020-12-09 DIAGNOSIS — Z01.818 ENCOUNTER FOR OTHER PREPROCEDURAL EXAMINATION: ICD-10-CM

## 2020-12-09 DIAGNOSIS — Z90.89 ACQUIRED ABSENCE OF OTHER ORGANS: Chronic | ICD-10-CM

## 2020-12-09 LAB
ANION GAP SERPL CALC-SCNC: 14 MMOL/L — SIGNIFICANT CHANGE UP (ref 5–17)
BUN SERPL-MCNC: 15 MG/DL — SIGNIFICANT CHANGE UP (ref 7–23)
CALCIUM SERPL-MCNC: 10.4 MG/DL — SIGNIFICANT CHANGE UP (ref 8.4–10.5)
CHLORIDE SERPL-SCNC: 102 MMOL/L — SIGNIFICANT CHANGE UP (ref 96–108)
CO2 SERPL-SCNC: 25 MMOL/L — SIGNIFICANT CHANGE UP (ref 22–31)
CREAT SERPL-MCNC: 0.88 MG/DL — SIGNIFICANT CHANGE UP (ref 0.5–1.3)
GLUCOSE SERPL-MCNC: 72 MG/DL — SIGNIFICANT CHANGE UP (ref 70–99)
HCT VFR BLD CALC: 41.8 % — SIGNIFICANT CHANGE UP (ref 39–50)
HGB BLD-MCNC: 13.5 G/DL — SIGNIFICANT CHANGE UP (ref 13–17)
MCHC RBC-ENTMCNC: 29.8 PG — SIGNIFICANT CHANGE UP (ref 27–34)
MCHC RBC-ENTMCNC: 32.3 GM/DL — SIGNIFICANT CHANGE UP (ref 32–36)
MCV RBC AUTO: 92.3 FL — SIGNIFICANT CHANGE UP (ref 80–100)
NRBC # BLD: 0 /100 WBCS — SIGNIFICANT CHANGE UP (ref 0–0)
PLATELET # BLD AUTO: 294 K/UL — SIGNIFICANT CHANGE UP (ref 150–400)
POTASSIUM SERPL-MCNC: 4.3 MMOL/L — SIGNIFICANT CHANGE UP (ref 3.5–5.3)
POTASSIUM SERPL-SCNC: 4.3 MMOL/L — SIGNIFICANT CHANGE UP (ref 3.5–5.3)
RBC # BLD: 4.53 M/UL — SIGNIFICANT CHANGE UP (ref 4.2–5.8)
RBC # FLD: 15.2 % — HIGH (ref 10.3–14.5)
SODIUM SERPL-SCNC: 141 MMOL/L — SIGNIFICANT CHANGE UP (ref 135–145)
WBC # BLD: 8.58 K/UL — SIGNIFICANT CHANGE UP (ref 3.8–10.5)
WBC # FLD AUTO: 8.58 K/UL — SIGNIFICANT CHANGE UP (ref 3.8–10.5)

## 2020-12-09 PROCEDURE — G0463: CPT

## 2020-12-09 PROCEDURE — 85027 COMPLETE CBC AUTOMATED: CPT

## 2020-12-09 PROCEDURE — 80048 BASIC METABOLIC PNL TOTAL CA: CPT

## 2020-12-09 RX ORDER — CHLORHEXIDINE GLUCONATE 213 G/1000ML
1 SOLUTION TOPICAL ONCE
Refills: 0 | Status: DISCONTINUED | OUTPATIENT
Start: 2020-12-18 | End: 2021-01-01

## 2020-12-09 RX ORDER — SODIUM CHLORIDE 9 MG/ML
3 INJECTION INTRAMUSCULAR; INTRAVENOUS; SUBCUTANEOUS EVERY 8 HOURS
Refills: 0 | Status: DISCONTINUED | OUTPATIENT
Start: 2020-12-18 | End: 2021-01-01

## 2020-12-09 RX ORDER — LIDOCAINE HCL 20 MG/ML
0.2 VIAL (ML) INJECTION ONCE
Refills: 0 | Status: DISCONTINUED | OUTPATIENT
Start: 2020-12-18 | End: 2021-01-01

## 2020-12-09 NOTE — H&P PST ADULT - NSICDXPROBLEM_GEN_ALL_CORE_FT
PROBLEM DIAGNOSES  Problem: CAD (coronary artery disease)  Assessment and Plan: hold plavix for 1 wk preop and begin ASA 81mg as per cardiologist    Problem: Unspecified acute appendicitis  Assessment and Plan: laparoscopic appendectomy

## 2020-12-09 NOTE — H&P PST ADULT - NSICDXPASTMEDICALHX_GEN_ALL_CORE_FT
PAST MEDICAL HISTORY:  Back pain     CAD S/P percutaneous coronary angioplasty 2007    H/O urethral stricture 1/2020    Heart attack 2007    HTN (hypertension)

## 2020-12-09 NOTE — H&P PST ADULT - NSICDXPASTSURGICALHX_GEN_ALL_CORE_FT
PAST SURGICAL HISTORY:  CAD S/P percutaneous coronary angioplasty 2 stents Taxus 2007    S/P cystoscopy urethrotomy 1/2020    S/P rotator cuff surgery left 2017    S/P tonsillectomy

## 2020-12-09 NOTE — H&P PST ADULT - HISTORY OF PRESENT ILLNESS
64 yo male with h/o appendicitis october 2020 and was hospitalized, treated with antibiotics then released.  now for laparoscopic appendectomy.  Pt continues to report abdominal bloating.      JENNY swab 12/15 @[facility]

## 2020-12-15 ENCOUNTER — OUTPATIENT (OUTPATIENT)
Dept: OUTPATIENT SERVICES | Facility: HOSPITAL | Age: 65
LOS: 1 days | End: 2020-12-15
Payer: COMMERCIAL

## 2020-12-15 DIAGNOSIS — Z90.89 ACQUIRED ABSENCE OF OTHER ORGANS: Chronic | ICD-10-CM

## 2020-12-15 DIAGNOSIS — Z98.890 OTHER SPECIFIED POSTPROCEDURAL STATES: Chronic | ICD-10-CM

## 2020-12-15 DIAGNOSIS — I25.10 ATHEROSCLEROTIC HEART DISEASE OF NATIVE CORONARY ARTERY WITHOUT ANGINA PECTORIS: Chronic | ICD-10-CM

## 2020-12-15 DIAGNOSIS — Z11.59 ENCOUNTER FOR SCREENING FOR OTHER VIRAL DISEASES: ICD-10-CM

## 2020-12-15 PROBLEM — I21.9 ACUTE MYOCARDIAL INFARCTION, UNSPECIFIED: Chronic | Status: ACTIVE | Noted: 2019-12-02

## 2020-12-15 PROBLEM — Z87.448 PERSONAL HISTORY OF OTHER DISEASES OF URINARY SYSTEM: Chronic | Status: ACTIVE | Noted: 2020-12-09

## 2020-12-15 LAB — SARS-COV-2 RNA SPEC QL NAA+PROBE: SIGNIFICANT CHANGE UP

## 2020-12-15 PROCEDURE — U0003: CPT

## 2020-12-17 ENCOUNTER — TRANSCRIPTION ENCOUNTER (OUTPATIENT)
Age: 65
End: 2020-12-17

## 2020-12-18 ENCOUNTER — APPOINTMENT (OUTPATIENT)
Dept: SURGERY | Facility: HOSPITAL | Age: 65
End: 2020-12-18
Payer: COMMERCIAL

## 2020-12-18 ENCOUNTER — OUTPATIENT (OUTPATIENT)
Dept: OUTPATIENT SERVICES | Facility: HOSPITAL | Age: 65
LOS: 1 days | End: 2020-12-18
Payer: COMMERCIAL

## 2020-12-18 ENCOUNTER — RESULT REVIEW (OUTPATIENT)
Age: 65
End: 2020-12-18

## 2020-12-18 ENCOUNTER — TRANSCRIPTION ENCOUNTER (OUTPATIENT)
Age: 65
End: 2020-12-18

## 2020-12-18 VITALS
RESPIRATION RATE: 16 BRPM | HEIGHT: 70 IN | SYSTOLIC BLOOD PRESSURE: 153 MMHG | TEMPERATURE: 98 F | HEART RATE: 84 BPM | DIASTOLIC BLOOD PRESSURE: 78 MMHG | OXYGEN SATURATION: 98 % | WEIGHT: 184.97 LBS

## 2020-12-18 VITALS — HEART RATE: 68 BPM | RESPIRATION RATE: 17 BRPM | OXYGEN SATURATION: 98 %

## 2020-12-18 DIAGNOSIS — K35.80 UNSPECIFIED ACUTE APPENDICITIS: ICD-10-CM

## 2020-12-18 DIAGNOSIS — Z90.89 ACQUIRED ABSENCE OF OTHER ORGANS: Chronic | ICD-10-CM

## 2020-12-18 DIAGNOSIS — Z98.890 OTHER SPECIFIED POSTPROCEDURAL STATES: Chronic | ICD-10-CM

## 2020-12-18 DIAGNOSIS — I25.10 ATHEROSCLEROTIC HEART DISEASE OF NATIVE CORONARY ARTERY WITHOUT ANGINA PECTORIS: Chronic | ICD-10-CM

## 2020-12-18 DIAGNOSIS — Z01.818 ENCOUNTER FOR OTHER PREPROCEDURAL EXAMINATION: ICD-10-CM

## 2020-12-18 PROCEDURE — 88341 IMHCHEM/IMCYTCHM EA ADD ANTB: CPT

## 2020-12-18 PROCEDURE — 44970 LAPAROSCOPY APPENDECTOMY: CPT

## 2020-12-18 PROCEDURE — 88300 SURGICAL PATH GROSS: CPT | Mod: 26,59

## 2020-12-18 PROCEDURE — C1889: CPT

## 2020-12-18 PROCEDURE — 88341 IMHCHEM/IMCYTCHM EA ADD ANTB: CPT | Mod: 26

## 2020-12-18 PROCEDURE — 88304 TISSUE EXAM BY PATHOLOGIST: CPT | Mod: 26

## 2020-12-18 PROCEDURE — C9399: CPT

## 2020-12-18 PROCEDURE — 88304 TISSUE EXAM BY PATHOLOGIST: CPT

## 2020-12-18 PROCEDURE — 88300 SURGICAL PATH GROSS: CPT

## 2020-12-18 PROCEDURE — 88342 IMHCHEM/IMCYTCHM 1ST ANTB: CPT | Mod: 26

## 2020-12-18 PROCEDURE — 11200 RMVL SKIN TAGS UP TO&INC 15: CPT

## 2020-12-18 RX ORDER — OXYCODONE HYDROCHLORIDE 5 MG/1
1 TABLET ORAL
Qty: 15 | Refills: 0
Start: 2020-12-18

## 2020-12-18 RX ORDER — FENTANYL CITRATE 50 UG/ML
25 INJECTION INTRAVENOUS
Refills: 0 | Status: DISCONTINUED | OUTPATIENT
Start: 2020-12-18 | End: 2020-12-18

## 2020-12-18 RX ORDER — SODIUM CHLORIDE 9 MG/ML
1000 INJECTION, SOLUTION INTRAVENOUS
Refills: 0 | Status: DISCONTINUED | OUTPATIENT
Start: 2020-12-18 | End: 2021-01-01

## 2020-12-18 RX ORDER — ACETAMINOPHEN 500 MG
975 TABLET ORAL EVERY 6 HOURS
Refills: 0 | Status: DISCONTINUED | OUTPATIENT
Start: 2020-12-18 | End: 2021-01-01

## 2020-12-18 RX ORDER — CEFOTETAN DISODIUM 1 G
2 VIAL (EA) INJECTION ONCE
Refills: 0 | Status: DISCONTINUED | OUTPATIENT
Start: 2020-12-18 | End: 2020-12-18

## 2020-12-18 RX ORDER — OXYCODONE HYDROCHLORIDE 5 MG/1
5 TABLET ORAL EVERY 4 HOURS
Refills: 0 | Status: DISCONTINUED | OUTPATIENT
Start: 2020-12-18 | End: 2020-12-18

## 2020-12-18 RX ORDER — CLOPIDOGREL BISULFATE 75 MG/1
1 TABLET, FILM COATED ORAL
Qty: 0 | Refills: 0 | DISCHARGE

## 2020-12-18 RX ORDER — ONDANSETRON 8 MG/1
4 TABLET, FILM COATED ORAL ONCE
Refills: 0 | Status: DISCONTINUED | OUTPATIENT
Start: 2020-12-18 | End: 2020-12-18

## 2020-12-18 RX ORDER — OXYCODONE HYDROCHLORIDE 5 MG/1
10 TABLET ORAL EVERY 4 HOURS
Refills: 0 | Status: DISCONTINUED | OUTPATIENT
Start: 2020-12-18 | End: 2020-12-18

## 2020-12-18 RX ORDER — ACETAMINOPHEN 500 MG
3 TABLET ORAL
Qty: 0 | Refills: 0 | DISCHARGE
Start: 2020-12-18

## 2020-12-18 RX ORDER — FENTANYL CITRATE 50 UG/ML
50 INJECTION INTRAVENOUS
Refills: 0 | Status: DISCONTINUED | OUTPATIENT
Start: 2020-12-18 | End: 2020-12-18

## 2020-12-18 RX ADMIN — FENTANYL CITRATE 50 MICROGRAM(S): 50 INJECTION INTRAVENOUS at 12:05

## 2020-12-18 RX ADMIN — FENTANYL CITRATE 50 MICROGRAM(S): 50 INJECTION INTRAVENOUS at 11:50

## 2020-12-18 NOTE — ASU DISCHARGE PLAN (ADULT/PEDIATRIC) - ASU DC SPECIAL INSTRUCTIONSFT
Please resume plavix on Sunday 12/20    Please take tylenol around the clock. Take oxycodone only as needed. Apply ice packs to the incisions for the next 24h to reduce pain and swelling.    Please call to schedule a follow up appointment in 10-14 days.

## 2020-12-18 NOTE — ASU DISCHARGE PLAN (ADULT/PEDIATRIC) - CARE PROVIDER_API CALL
Román Merrill  SURGERY  [address], [address]  [place] [address]  Phone: [phone]  Fax: [phone]  Follow Up Time:

## 2020-12-18 NOTE — ASU DISCHARGE PLAN (ADULT/PEDIATRIC) - CALL YOUR DOCTOR IF YOU HAVE ANY OF THE FOLLOWING:
Bleeding that does not stop/Pain not relieved by Medications/Fever greater than (need to indicate Fahrenheit or Celsius)/Nausea and vomiting that does not stop/Unable to urinate/Inability to tolerate liquids or foods

## 2020-12-18 NOTE — DISCHARGE NOTE NURSING/CASE MANAGEMENT/SOCIAL WORK - PATIENT PORTAL LINK FT
You can access the FollowMyHealth Patient Portal offered by [facility] by registering at the following website: [email or web address]. By joining [facility]’s FollowMyHealth portal, you will also be able to view your health information using other applications (apps) compatible with our system.

## 2020-12-18 NOTE — BRIEF OPERATIVE NOTE - OPERATION/FINDINGS
mesoappendix taken with ligasure. appendix taken at base with purple 45. endocatch bag. supraumbilical incision closed with vicryl. normal appearing appendix

## 2020-12-18 NOTE — ASU PATIENT PROFILE, ADULT - PATIENT KNOW
Patient Seen in: [facility] AND [facility] Emergency Department    History   No chief complaint on file. 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   No rhythm, normal heart sounds and intact distal pulses. Pulmonary/Chest: Effort normal and breath sounds normal.   Abdominal: Soft. Bowel sounds are normal. She exhibits no distension and no mass. There is no tenderness.  There is no rebound and no guardin yes

## 2020-12-29 LAB — SURGICAL PATHOLOGY STUDY: SIGNIFICANT CHANGE UP

## 2020-12-30 ENCOUNTER — APPOINTMENT (OUTPATIENT)
Dept: SURGERY | Facility: CLINIC | Age: 65
End: 2020-12-30
Payer: COMMERCIAL

## 2020-12-30 VITALS
DIASTOLIC BLOOD PRESSURE: 76 MMHG | HEART RATE: 91 BPM | OXYGEN SATURATION: 96 % | TEMPERATURE: 97.5 F | RESPIRATION RATE: 17 BRPM | SYSTOLIC BLOOD PRESSURE: 127 MMHG

## 2020-12-30 DIAGNOSIS — K35.80 UNSPECIFIED ACUTE APPENDICITIS: ICD-10-CM

## 2020-12-30 PROCEDURE — 99024 POSTOP FOLLOW-UP VISIT: CPT

## 2020-12-30 NOTE — REASON FOR VISIT
[Post Op: _________] : a [unfilled] post op visit [[email or web address]] : Laparoscopic appendectomy and biopsy of right abdominal wall nodule.

## 2021-02-08 ENCOUNTER — RX RENEWAL (OUTPATIENT)
Age: 66
End: 2021-02-08

## 2021-02-22 ENCOUNTER — RX RENEWAL (OUTPATIENT)
Age: 66
End: 2021-02-22

## 2021-07-21 ENCOUNTER — APPOINTMENT (OUTPATIENT)
Dept: CARDIOLOGY | Facility: CLINIC | Age: 66
End: 2021-07-21

## 2021-07-28 ENCOUNTER — APPOINTMENT (OUTPATIENT)
Dept: CARDIOLOGY | Facility: CLINIC | Age: 66
End: 2021-07-28

## 2021-12-31 NOTE — ED ADULT NURSE NOTE - HOW MANY DRINKS CONTAINING ALCOHOL DO YOU HAVE ON A TYPICAL DAY WHEN YOU ARE DRINKING?
Dr. Shailesh Cruz    Patient stated she will have to check with her cardiolgist before increasing Levothyroxine medication to 100 mcg, she stated she is worried about her heart. She is seeing her cardiologist on 1/12/22.  She is requesting for the prescription to b 1 or 2

## 2022-03-14 NOTE — H&P PST ADULT - NSICDXPROBLEM_GEN_ALL_CORE_FT
Please call us back [phone] at if the symptoms that we discussed today are persisting. If you experience any new or worsening symptoms, please contact us immediately.    Did you know?  [facility] is rated 5 out of 5 stars by Medicare  Check us out @  Https://www.medicare.gov/hospitalcompare/search.html    Please also review our rating for safety @  http://www.hospitalsafetygrade.org/      Lab Services on 12/28/2021   Component Date Value Ref Range Status   • Sodium 12/28/2021 134 (A) 135 - 145 mmol/L Final   • Potassium 12/28/2021 4.3  3.4 - 5.1 mmol/L Final   • Chloride 12/28/2021 101  98 - 107 mmol/L Final   • Carbon Dioxide 12/28/2021 26  21 - 32 mmol/L Final   • Anion Gap 12/28/2021 11  10 - 20 mmol/L Final   • Glucose 12/28/2021 97  70 - 99 mg/dL Final   • BUN 12/28/2021 15  6 - 20 mg/dL Final   • Creatinine 12/28/2021 1.10  0.67 - 1.17 mg/dL Final   • Glomerular Filtration Rate 12/28/2021 69  >=60 Final    eGFR results = or >60 mL/min/1.73m2 = Normal kidney function. Estimated GFR calculated using the 2009 CKD-EPI creatinine equation.     • BUN/ Creatinine Ratio 12/28/2021 14  7 - 25 Final   • Calcium 12/28/2021 9.5  8.4 - 10.2 mg/dL Final   • Bilirubin, Total 12/28/2021 1.0  0.2 - 1.0 mg/dL Final   • GOT/AST 12/28/2021 31  <=37 Units/L Final   • GPT/ALT 12/28/2021 33  <64 Units/L Final   • Alkaline Phosphatase 12/28/2021 65  45 - 117 Units/L Final   • Albumin 12/28/2021 4.0  3.6 - 5.1 g/dL Final   • Protein, Total 12/28/2021 6.8  6.4 - 8.2 g/dL Final   • Globulin 12/28/2021 2.8  2.0 - 4.0 g/dL Final   • A/G Ratio 12/28/2021 1.4  1.0 - 2.4 Final   • Hemoglobin A1C 12/28/2021 4.8  4.5 - 5.6 % Final      Diabetic Screening  Non Diabetic:             <5.7%  Increased Risk:           5.7-6.4%  Diagnostic For Diabetes:  >6.4%    Diabetic Control  A1C%       eAG mg/dL  6.0            126  6.5            140  7.0            154  7.5            169  8.0            183  8.5             197  9.0            212  9.5            226  10.0           240   • Cholesterol 12/28/2021 133  <=199 mg/dL Final    Desirable         <200  Borderline High   200 to 239  High              >=240   • Triglycerides 12/28/2021 60  <=149 mg/dL Final    Normal            <150  Borderline High   150 to 199  High              200 to 499  Very High         >=500   • HDL 12/28/2021 61  >=40 mg/dL Final    Low              <40  Borderline Low   40 to 49  Near Optimal     50 to 59  Optimal          >=60   • LDL 12/28/2021 60  <=129 mg/dL Final    OPTIMAL           <100  NEAR OPTIMAL      100 to 129  BORDERLINE HIGH   130 to 159  HIGH              160 to 189  VERY HIGH         >=190   • Non-HDL Cholesterol 12/28/2021 72  mg/dL Final    Therapeutic Target:  CHD and risk equivalents  <130  Multiple risk factors     <160  0 to 1 risk factor        <190   • Cholesterol/ HDL Ratio 12/28/2021 2.2  <=4.4 Final   • Prostate Specific Antigen 12/28/2021 0.72  <=4.50 ng/mL Final    [employer] Chemiluminescence   • Vitamin D, 25-Hydroxy 12/28/2021 61.7  30.0 - 100.0 ng/mL Final    <20 ng/mL  =  Vitamin D deficiency  20-29 ng/mL  =  Vitamin D insufficiency   ng/mL  =  Optimal Vitamin D  >150 ng/mL  =  Possible toxicity   • Microalbumin, Urine 12/28/2021 <0.50  mg/dL Final   • Creatinine, Urine 12/28/2021 26.20  mg/dL Final   • Microalbumin/ Creatinine Ratio 12/28/2021    Final    Unable to calculate due to low analyte concentration   • WBC 12/28/2021 4.1 (A) 4.2 - 11.0 K/mcL Final   • RBC 12/28/2021 4.43 (A) 4.50 - 5.90 mil/mcL Final   • HGB 12/28/2021 14.1  13.0 - 17.0 g/dL Final   • HCT 12/28/2021 41.3  39.0 - 51.0 % Final   • MCV 12/28/2021 93.2  78.0 - 100.0 fl Final   • MCH 12/28/2021 31.8  26.0 - 34.0 pg Final   • MCHC 12/28/2021 34.1  32.0 - 36.5 g/dL Final   • RDW-CV 12/28/2021 12.7  11.0 - 15.0 % Final   • RDW-SD 12/28/2021 43.6  39.0 - 50.0 fL Final   • PLT 12/28/2021 224  140 - 450 K/mcL Final   • NRBC 12/28/2021 0  <=0  /100 WBC Final   • Neutrophil, Percent 12/28/2021 71  % Final   • Lymphocytes, Percent 12/28/2021 15  % Final   • Mono, Percent 12/28/2021 9  % Final   • Eosinophils, Percent 12/28/2021 3  % Final   • Basophils, Percent 12/28/2021 1  % Final   • Immature Granulocytes 12/28/2021 1  % Final   • Absolute Neutrophils 12/28/2021 2.9  1.8 - 7.7 K/mcL Final   • Absolute Lymphocytes 12/28/2021 0.6 (A) 1.0 - 4.0 K/mcL Final   • Absolute Monocytes 12/28/2021 0.4  0.3 - 0.9 K/mcL Final   • Absolute Eosinophils  12/28/2021 0.1  0.0 - 0.5 K/mcL Final   • Absolute Basophils 12/28/2021 0.0  0.0 - 0.3 K/mcL Final   • Absolute Immmature Granulocytes 12/28/2021 0.0  0.0 - 0.2 K/mcL Final       General information to help you to understand the meaning of your lab values:    On Blood Chemistries:    Sodium, Potassium, Chloride and Bicarbonate refer to electrolytes in your body.  These can be affected by the amount of fluid in your body, nutrition, and various medications.    BUN and Creatinine measure Kidney function. Creatinine is the best, most pure measure of this.  The BUN is of interest when the Creatinine is elevated as it can help us to know why.  The BUN often goes up when you have less overall fluid in your body.  This can occur with diuretic medications, but also frequently can occur when you are in a fasting state for your labs.  A BUN/Creatinine ration of >20 also is often indicative of having less fluid in your body.    AST/ALT/Bilirubin/Alkaline Phosphatase are tests on your liver.  When elevated, these can indicate inflammation of your liver or gallbladder.    Cholesterol:     For most people, we have a goal LDL cholesterol of less than 130.  In patients with Heart Disease, Diabetes, or Strokes, our target for LDL is less than 100.    Our target for HDL (good) cholesterol is at least >45 and ideally >65.  Regular cardiovascular exercise can often help to improve  this.      -----------------------------------------------------------------------------------------------------------------------------------------------------------------------------------------------------------------------------------------------------------      Silvio,    On behalf of my care team, I would like to thank you for entrusting us with your care today. It is our goal to provide you with excellent care.  If there is anything that we can do to serve you better, please let us know.  Once again, Thank you.    Julio Juarez MD      -----------------------------------------------------------------------------------------------------------------------------------------------------------------------------------------------------------------------------------------------------------       Recommendations from Health Risk Assessment:    Continue to exercise as you are doing         Your Health Screening History and Calendar.     Vaccines                                                  Date Provided                    DUE  Td or Tdap                                                 9/24/18 2028  (Every 10 years)     Flu Vaccine (annual)                                 Recommended Annually         Pneumococcal Vaccine                           9/26/19                                  completed     Prevnar Vaccine                                        9/24/18                                  completed     Shingles Vaccine                                      Recommended                                           (After age 60)     Hepatitis B Vaccine                                   2002                                      (For diabetics or severe kidney  Disease)        Cancer Screening:     Colonoscopy (age 50-75)                         7/6/18 2028  Starting earlier if high risk.  Stopping at age 85 if history of polyps.     Stool for  hidden blood                               To Consider 3 yrs after colonoscopy                           PSA (at age 55-70)                                   today                                   annual                                                                    Earlier if high risk  See additional information below*        Rectal Exam                                              optional                                                                           OTHER:     Tobacco Cessation                                   Non smoker                                         AAA Screening                                         CT 2017                                           (Once in males >65 who have  EVER smoked)     Diabetic Screening                                   today                                   6 months     Cholesterol Screening                              today                                   6 months     Bone Density Testing                               Not applicable.                                            (If risk factors present)     HIV Screening                                           Not applicable.                                            (Once)     Hepatitis C Screening                               3/2/15                                                List of other Providers:  Julio Juarez MD-PCP  Dr Ion Hummel            PROBLEM DIAGNOSES  Problem: Urethral stricture  Assessment and Plan: PST Labs: CBC, CMP, U/A, Urine Culture, (EKG from 12/2019) on chart - Pt was originally seen for cardiacclearance by Dr Lazaro Dec 2019 for procedure scheduled 12/11/19 - pt developed Shingles therefore case was postponed - as per Dr Avitia office pt does NOT need to return to office he will  review PST's and add addeneum to original cardiac clearance note- Pt was instructed to stop any NSAIDS/herbal Supplements/Alonzo-E between now and procedure - he will STOP his Plavix on 1/16/2020 as per Dr Mcgee - at that time he will START Baby ASA daily (Secondary to H/O Coronay artery stents X 2 placed 2007) - He will HOLD Cialis night prior to procedure - morning of procedure he will take his Enalapril, Omeprazole, Alfuzosin, Wellbutrin with small sip of water -pre-op instructions given to pt with understanding verbalized

## 2022-05-10 ENCOUNTER — INPATIENT (INPATIENT)
Facility: HOSPITAL | Age: 67
LOS: 2 days | Discharge: HOME | DRG: 177 | End: 2022-05-13
Attending: STUDENT IN AN ORGANIZED HEALTH CARE EDUCATION/TRAINING PROGRAM | Admitting: STUDENT IN AN ORGANIZED HEALTH CARE EDUCATION/TRAINING PROGRAM
Payer: MEDICARE

## 2022-05-10 VITALS
OXYGEN SATURATION: 96 % | TEMPERATURE: 98 F | DIASTOLIC BLOOD PRESSURE: 64 MMHG | SYSTOLIC BLOOD PRESSURE: 129 MMHG | HEIGHT: 70 IN | HEART RATE: 72 BPM | RESPIRATION RATE: 18 BRPM

## 2022-05-10 DIAGNOSIS — Z98.890 OTHER SPECIFIED POSTPROCEDURAL STATES: Chronic | ICD-10-CM

## 2022-05-10 DIAGNOSIS — Z90.89 ACQUIRED ABSENCE OF OTHER ORGANS: Chronic | ICD-10-CM

## 2022-05-10 DIAGNOSIS — I25.10 ATHEROSCLEROTIC HEART DISEASE OF NATIVE CORONARY ARTERY WITHOUT ANGINA PECTORIS: Chronic | ICD-10-CM

## 2022-05-10 LAB
ALBUMIN SERPL ELPH-MCNC: 4.5 G/DL — SIGNIFICANT CHANGE UP (ref 3.3–5)
ALP SERPL-CCNC: 80 U/L — SIGNIFICANT CHANGE UP (ref 40–120)
ALT FLD-CCNC: 21 U/L — SIGNIFICANT CHANGE UP (ref 10–45)
ANION GAP SERPL CALC-SCNC: 13 MMOL/L — SIGNIFICANT CHANGE UP (ref 5–17)
APTT BLD: 29.8 SEC — SIGNIFICANT CHANGE UP (ref 27.5–35.5)
AST SERPL-CCNC: 20 U/L — SIGNIFICANT CHANGE UP (ref 10–40)
BASOPHILS # BLD AUTO: 0.05 K/UL — SIGNIFICANT CHANGE UP (ref 0–0.2)
BASOPHILS NFR BLD AUTO: 0.6 % — SIGNIFICANT CHANGE UP (ref 0–2)
BILIRUB SERPL-MCNC: 0.5 MG/DL — SIGNIFICANT CHANGE UP (ref 0.2–1.2)
BUN SERPL-MCNC: 16 MG/DL — SIGNIFICANT CHANGE UP (ref 7–23)
CALCIUM SERPL-MCNC: 9.6 MG/DL — SIGNIFICANT CHANGE UP (ref 8.4–10.5)
CHLORIDE SERPL-SCNC: 104 MMOL/L — SIGNIFICANT CHANGE UP (ref 96–108)
CO2 SERPL-SCNC: 22 MMOL/L — SIGNIFICANT CHANGE UP (ref 22–31)
CREAT SERPL-MCNC: 0.81 MG/DL — SIGNIFICANT CHANGE UP (ref 0.5–1.3)
EGFR: 98 ML/MIN/1.73M2 — SIGNIFICANT CHANGE UP
EOSINOPHIL # BLD AUTO: 0.25 K/UL — SIGNIFICANT CHANGE UP (ref 0–0.5)
EOSINOPHIL NFR BLD AUTO: 3.1 % — SIGNIFICANT CHANGE UP (ref 0–6)
GLUCOSE SERPL-MCNC: 93 MG/DL — SIGNIFICANT CHANGE UP (ref 70–99)
HCT VFR BLD CALC: 37.8 % — LOW (ref 39–50)
HGB BLD-MCNC: 12.1 G/DL — LOW (ref 13–17)
IMM GRANULOCYTES NFR BLD AUTO: 0.9 % — SIGNIFICANT CHANGE UP (ref 0–1.5)
INR BLD: 1.21 RATIO — HIGH (ref 0.88–1.16)
LYMPHOCYTES # BLD AUTO: 2 K/UL — SIGNIFICANT CHANGE UP (ref 1–3.3)
LYMPHOCYTES # BLD AUTO: 24.8 % — SIGNIFICANT CHANGE UP (ref 13–44)
MCHC RBC-ENTMCNC: 30.2 PG — SIGNIFICANT CHANGE UP (ref 27–34)
MCHC RBC-ENTMCNC: 32 GM/DL — SIGNIFICANT CHANGE UP (ref 32–36)
MCV RBC AUTO: 94.3 FL — SIGNIFICANT CHANGE UP (ref 80–100)
MONOCYTES # BLD AUTO: 0.77 K/UL — SIGNIFICANT CHANGE UP (ref 0–0.9)
MONOCYTES NFR BLD AUTO: 9.6 % — SIGNIFICANT CHANGE UP (ref 2–14)
NEUTROPHILS # BLD AUTO: 4.92 K/UL — SIGNIFICANT CHANGE UP (ref 1.8–7.4)
NEUTROPHILS NFR BLD AUTO: 61 % — SIGNIFICANT CHANGE UP (ref 43–77)
NRBC # BLD: 0 /100 WBCS — SIGNIFICANT CHANGE UP (ref 0–0)
PLATELET # BLD AUTO: 288 K/UL — SIGNIFICANT CHANGE UP (ref 150–400)
POTASSIUM SERPL-MCNC: 4 MMOL/L — SIGNIFICANT CHANGE UP (ref 3.5–5.3)
POTASSIUM SERPL-SCNC: 4 MMOL/L — SIGNIFICANT CHANGE UP (ref 3.5–5.3)
PROT SERPL-MCNC: 7.5 G/DL — SIGNIFICANT CHANGE UP (ref 6–8.3)
PROTHROM AB SERPL-ACNC: 13.9 SEC — HIGH (ref 10.5–13.4)
RBC # BLD: 4.01 M/UL — LOW (ref 4.2–5.8)
RBC # FLD: 14.7 % — HIGH (ref 10.3–14.5)
SODIUM SERPL-SCNC: 139 MMOL/L — SIGNIFICANT CHANGE UP (ref 135–145)
WBC # BLD: 8.06 K/UL — SIGNIFICANT CHANGE UP (ref 3.8–10.5)
WBC # FLD AUTO: 8.06 K/UL — SIGNIFICANT CHANGE UP (ref 3.8–10.5)

## 2022-05-10 PROCEDURE — 99285 EMERGENCY DEPT VISIT HI MDM: CPT | Mod: CS

## 2022-05-10 PROCEDURE — 71045 X-RAY EXAM CHEST 1 VIEW: CPT | Mod: 26

## 2022-05-10 NOTE — ED PROVIDER NOTE - OBJECTIVE STATEMENT
66 year old male PMH CAD s/p stent x2, HTN, occasional tobacco user, presents to ED for +sputum culture. Patient states he has known lung nodule which is regularly followed w/ CT. He has been having chronic cough, occasionally w/ green sputum. He had bronch in February without abnormal findings. On 5/5 he provided sputum culture which resulted +MTB. He was advised to come to the ED by his friend who is a physician. He also notes his wife tested COVID+ yesterday and he tested positive after her. He denies fever, chills, hemoptysis, chest pain, sob, unintentional weight loss, or back pain.

## 2022-05-10 NOTE — ED ADULT NURSE NOTE - OBJECTIVE STATEMENT
65 y/o male presents to ED from home sent by his PCP for active TB and covid. Pt has been worked up over last few weeks for persistent cough, tested + via sputum test. His wife tested covid+ yesterday, he got tested this morning and was also +. Denying fevers, chills, n/v/d, headaches. Pt is A&O x 4. Breathing even and unlabored. Mild coughing noted. Gross motor and neuro intact. Gross motor and neuro intact. NSR on CM. 20G IV placed in R forearm. Safety and comfort provided.

## 2022-05-10 NOTE — ED PROVIDER NOTE - ATTENDING CONTRIBUTION TO CARE
pt is a 65 y/o male with PMH cardiac stents in 2008, HTN, urethral stricture s/p DVIU (January 2020) presents with pos outpt tb sputum today and also covid pos today as well with wife who tesed pos yuesterday for covid with chronic cough intermittently productive cough, no hemoptysis, weight loss or night sweat, or yearly lung nodule screening recently found to have changers, pos pet scan, brnoch which was neg a few weeks back, but sputum now pos sent in for admission. well appearing, vss, lungs cta b/l, no sob, vss, cxr, labs, id consult, likely admission.

## 2022-05-10 NOTE — ED PROVIDER NOTE - NS ED ROS FT
GENERAL: no fever, no chills, no weight loss  EYES: no change in vision, no irritation, no discharge, no redness, no pain  HEENT: no trouble swallowing or speaking  CARDIAC: no chest pain, no palpitations   PULMONARY: +cough, no shortness of breath, no wheezing, no hemoptysis  GI: no abdominal pain, no nausea, no vomiting, no diarrhea, no constipation  : no changes in urination, no dysuria  SKIN: no rashes  NEURO: no headache, no numbness, no weakness  MSK: no joint pain, no muscle pain, no back pain, no calf pain

## 2022-05-10 NOTE — ED PROVIDER NOTE - PROGRESS NOTE DETAILS
Niles Carias D.O., PGY3 (Resident)  Patient signed out to me. Hemodynamically stable. Labs nonactionable. CXR w/o focal opacity on my eye. Pending admission. Call was received by family and Mr. Jamil Rao re: care patient is receiving. " only took bloodwork and a blood pressure". Concern was raised re: why patient has not received monoclonal antibody yet and why patient waited in ED for a long time. Explained to Mr. Rao that administering monoclonal antibody is dependent on ED resources at the present time and we are awaiting a call back from the inpatient hospitalist to admit the patient. Whether the patient can have monoclonal antibody in the setting of active tb per previous provider note will remain a question between ID and the hospitalist service. Niles Carias D.O., PGY3 (Resident)  Discussed with [facility]. Admit.

## 2022-05-11 DIAGNOSIS — I10 ESSENTIAL (PRIMARY) HYPERTENSION: ICD-10-CM

## 2022-05-11 DIAGNOSIS — R84.5 ABNORMAL MICROBIOLOGICAL FINDINGS IN SPECIMENS FROM RESPIRATORY ORGANS AND THORAX: ICD-10-CM

## 2022-05-11 DIAGNOSIS — Z29.9 ENCOUNTER FOR PROPHYLACTIC MEASURES, UNSPECIFIED: ICD-10-CM

## 2022-05-11 DIAGNOSIS — U07.1 COVID-19: ICD-10-CM

## 2022-05-11 DIAGNOSIS — F32.9 MAJOR DEPRESSIVE DISORDER, SINGLE EPISODE, UNSPECIFIED: ICD-10-CM

## 2022-05-11 DIAGNOSIS — A15.9 RESPIRATORY TUBERCULOSIS UNSPECIFIED: ICD-10-CM

## 2022-05-11 DIAGNOSIS — I25.10 ATHEROSCLEROTIC HEART DISEASE OF NATIVE CORONARY ARTERY WITHOUT ANGINA PECTORIS: ICD-10-CM

## 2022-05-11 LAB
RAPID RVP RESULT: DETECTED
SARS-COV-2 RNA SPEC QL NAA+PROBE: DETECTED

## 2022-05-11 PROCEDURE — 71250 CT THORAX DX C-: CPT | Mod: 26

## 2022-05-11 PROCEDURE — 99223 1ST HOSP IP/OBS HIGH 75: CPT

## 2022-05-11 PROCEDURE — 99222 1ST HOSP IP/OBS MODERATE 55: CPT

## 2022-05-11 RX ORDER — ACETAMINOPHEN 500 MG
650 TABLET ORAL ONCE
Refills: 0 | Status: COMPLETED | OUTPATIENT
Start: 2022-05-11 | End: 2022-05-11

## 2022-05-11 RX ORDER — SIMVASTATIN 20 MG/1
40 TABLET, FILM COATED ORAL AT BEDTIME
Refills: 0 | Status: DISCONTINUED | OUTPATIENT
Start: 2022-05-11 | End: 2022-05-13

## 2022-05-11 RX ORDER — LANOLIN ALCOHOL/MO/W.PET/CERES
3 CREAM (GRAM) TOPICAL AT BEDTIME
Refills: 0 | Status: DISCONTINUED | OUTPATIENT
Start: 2022-05-11 | End: 2022-05-13

## 2022-05-11 RX ORDER — BUPROPION HYDROCHLORIDE 150 MG/1
1 TABLET, EXTENDED RELEASE ORAL
Qty: 0 | Refills: 0 | DISCHARGE

## 2022-05-11 RX ORDER — OMEPRAZOLE 10 MG/1
1 CAPSULE, DELAYED RELEASE ORAL
Qty: 0 | Refills: 0 | DISCHARGE

## 2022-05-11 RX ORDER — SIMVASTATIN 20 MG/1
1 TABLET, FILM COATED ORAL
Qty: 0 | Refills: 0 | DISCHARGE

## 2022-05-11 RX ORDER — CHOLECALCIFEROL (VITAMIN D3) 125 MCG
1000 CAPSULE ORAL DAILY
Refills: 0 | Status: DISCONTINUED | OUTPATIENT
Start: 2022-05-11 | End: 2022-05-13

## 2022-05-11 RX ORDER — ONDANSETRON 8 MG/1
4 TABLET, FILM COATED ORAL EVERY 8 HOURS
Refills: 0 | Status: DISCONTINUED | OUTPATIENT
Start: 2022-05-11 | End: 2022-05-13

## 2022-05-11 RX ORDER — CLOPIDOGREL BISULFATE 75 MG/1
75 TABLET, FILM COATED ORAL DAILY
Refills: 0 | Status: DISCONTINUED | OUTPATIENT
Start: 2022-05-11 | End: 2022-05-13

## 2022-05-11 RX ORDER — ALFUZOSIN HYDROCHLORIDE 10 MG/1
1 TABLET, EXTENDED RELEASE ORAL
Qty: 0 | Refills: 0 | DISCHARGE

## 2022-05-11 RX ORDER — CLOPIDOGREL BISULFATE 75 MG/1
1 TABLET, FILM COATED ORAL
Qty: 0 | Refills: 0 | DISCHARGE

## 2022-05-11 RX ORDER — TADALAFIL 10 MG/1
1 TABLET, FILM COATED ORAL
Qty: 0 | Refills: 0 | DISCHARGE

## 2022-05-11 RX ORDER — TAMSULOSIN HYDROCHLORIDE 0.4 MG/1
0.8 CAPSULE ORAL AT BEDTIME
Refills: 0 | Status: DISCONTINUED | OUTPATIENT
Start: 2022-05-11 | End: 2022-05-13

## 2022-05-11 RX ORDER — PYRAZINAMIDE 500 MG/1
1500 TABLET ORAL DAILY
Refills: 0 | Status: DISCONTINUED | OUTPATIENT
Start: 2022-05-11 | End: 2022-05-13

## 2022-05-11 RX ORDER — ETHAMBUTOL HYDROCHLORIDE 400 MG/1
1200 TABLET, FILM COATED ORAL DAILY
Refills: 0 | Status: DISCONTINUED | OUTPATIENT
Start: 2022-05-11 | End: 2022-05-13

## 2022-05-11 RX ORDER — PANTOPRAZOLE SODIUM 20 MG/1
40 TABLET, DELAYED RELEASE ORAL
Refills: 0 | Status: DISCONTINUED | OUTPATIENT
Start: 2022-05-11 | End: 2022-05-13

## 2022-05-11 RX ORDER — BUPROPION HYDROCHLORIDE 150 MG/1
300 TABLET, EXTENDED RELEASE ORAL DAILY
Refills: 0 | Status: DISCONTINUED | OUTPATIENT
Start: 2022-05-11 | End: 2022-05-13

## 2022-05-11 RX ORDER — ASPIRIN/CALCIUM CARB/MAGNESIUM 324 MG
81 TABLET ORAL DAILY
Refills: 0 | Status: DISCONTINUED | OUTPATIENT
Start: 2022-05-11 | End: 2022-05-13

## 2022-05-11 RX ORDER — PYRIDOXINE HCL (VITAMIN B6) 100 MG
50 TABLET ORAL DAILY
Refills: 0 | Status: DISCONTINUED | OUTPATIENT
Start: 2022-05-11 | End: 2022-05-13

## 2022-05-11 RX ORDER — HEXAVITAMINS
300 TABLET ORAL DAILY
Refills: 0 | Status: DISCONTINUED | OUTPATIENT
Start: 2022-05-11 | End: 2022-05-13

## 2022-05-11 RX ORDER — FLUTICASONE PROPIONATE 50 MCG
1 SPRAY, SUSPENSION NASAL
Refills: 0 | Status: DISCONTINUED | OUTPATIENT
Start: 2022-05-11 | End: 2022-05-13

## 2022-05-11 RX ORDER — GUAIFENESIN/DEXTROMETHORPHAN 600MG-30MG
10 TABLET, EXTENDED RELEASE 12 HR ORAL
Refills: 0 | Status: DISCONTINUED | OUTPATIENT
Start: 2022-05-11 | End: 2022-05-13

## 2022-05-11 RX ORDER — LORATADINE 10 MG/1
10 TABLET ORAL DAILY
Refills: 0 | Status: DISCONTINUED | OUTPATIENT
Start: 2022-05-11 | End: 2022-05-13

## 2022-05-11 RX ORDER — ASPIRIN/CALCIUM CARB/MAGNESIUM 324 MG
1 TABLET ORAL
Qty: 0 | Refills: 0 | DISCHARGE

## 2022-05-11 RX ORDER — ACETAMINOPHEN 500 MG
650 TABLET ORAL EVERY 6 HOURS
Refills: 0 | Status: DISCONTINUED | OUTPATIENT
Start: 2022-05-11 | End: 2022-05-13

## 2022-05-11 RX ORDER — CHOLECALCIFEROL (VITAMIN D3) 125 MCG
1 CAPSULE ORAL
Qty: 0 | Refills: 0 | DISCHARGE

## 2022-05-11 RX ORDER — ENOXAPARIN SODIUM 100 MG/ML
40 INJECTION SUBCUTANEOUS EVERY 24 HOURS
Refills: 0 | Status: DISCONTINUED | OUTPATIENT
Start: 2022-05-11 | End: 2022-05-13

## 2022-05-11 RX ORDER — CHLORHEXIDINE GLUCONATE 213 G/1000ML
1 SOLUTION TOPICAL DAILY
Refills: 0 | Status: DISCONTINUED | OUTPATIENT
Start: 2022-05-11 | End: 2022-05-13

## 2022-05-11 RX ADMIN — Medication 1000 UNIT(S): at 11:12

## 2022-05-11 RX ADMIN — Medication 650 MILLIGRAM(S): at 05:20

## 2022-05-11 RX ADMIN — PYRAZINAMIDE 1500 MILLIGRAM(S): 500 TABLET ORAL at 16:41

## 2022-05-11 RX ADMIN — CHLORHEXIDINE GLUCONATE 1 APPLICATION(S): 213 SOLUTION TOPICAL at 11:51

## 2022-05-11 RX ADMIN — ETHAMBUTOL HYDROCHLORIDE 1200 MILLIGRAM(S): 400 TABLET, FILM COATED ORAL at 16:41

## 2022-05-11 RX ADMIN — LORATADINE 10 MILLIGRAM(S): 10 TABLET ORAL at 11:12

## 2022-05-11 RX ADMIN — TAMSULOSIN HYDROCHLORIDE 0.8 MILLIGRAM(S): 0.4 CAPSULE ORAL at 21:05

## 2022-05-11 RX ADMIN — Medication 650 MILLIGRAM(S): at 04:50

## 2022-05-11 RX ADMIN — SIMVASTATIN 40 MILLIGRAM(S): 20 TABLET, FILM COATED ORAL at 21:06

## 2022-05-11 RX ADMIN — CLOPIDOGREL BISULFATE 75 MILLIGRAM(S): 75 TABLET, FILM COATED ORAL at 11:13

## 2022-05-11 RX ADMIN — Medication 200 MILLIGRAM(S): at 11:12

## 2022-05-11 RX ADMIN — Medication 650 MILLIGRAM(S): at 16:36

## 2022-05-11 RX ADMIN — ENOXAPARIN SODIUM 40 MILLIGRAM(S): 100 INJECTION SUBCUTANEOUS at 11:13

## 2022-05-11 RX ADMIN — Medication 1 SPRAY(S): at 16:43

## 2022-05-11 RX ADMIN — Medication 300 MILLIGRAM(S): at 16:41

## 2022-05-11 RX ADMIN — Medication 200 MILLIGRAM(S): at 21:05

## 2022-05-11 RX ADMIN — Medication 50 MILLIGRAM(S): at 16:54

## 2022-05-11 RX ADMIN — BUPROPION HYDROCHLORIDE 300 MILLIGRAM(S): 150 TABLET, EXTENDED RELEASE ORAL at 11:13

## 2022-05-11 RX ADMIN — Medication 10 MILLILITER(S): at 11:13

## 2022-05-11 RX ADMIN — PANTOPRAZOLE SODIUM 40 MILLIGRAM(S): 20 TABLET, DELAYED RELEASE ORAL at 16:43

## 2022-05-11 RX ADMIN — Medication 650 MILLIGRAM(S): at 17:06

## 2022-05-11 NOTE — CONSULT NOTE ADULT - ASSESSMENT
ASSESSMENT:    66 year old gentleman, current smoker, with no known history of intrinsic lung disease. He has a history of HTN, CAD s/p PCI and MI with preserved LVEF, urinary retention s/p cystoscopy in 2019 for intervention on a urethral stricture and chronic appendicitis s/p laparoscopic resection in 2020. He has a chronic left parotid mass. He was seen in March by Dr. Tylor Polk in follow-up. Biopsy of this mass and a nearby node was negative for malignancy many years ago. 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 The patient is followed in the outpatient setting for pulmonary nodules. He developed a pulmonary infiltrate with mediastinal adenopathy earlier this year. Bronchoscopy with subcarinal and left hilar node biopsies was negative for neoplasm and granulomatous inflammation. The right hilar node was not biopsied due to proximity to the pulmonary artery. Sampling of the right upper lobe lesion was also without neoplasm. AFB cultures have been negative x 8 weeks. Follow-up chest CT on March 29th -> patent central airways - stable peribronchial thickening with fine peripheral groundglass reticular opacities c/w mild interstitial lung disease - stable mild emphysema - stable right upper lobe granuloma - progression of the patchy branching right apical consolidation with a tree in bud configuration extending to the medial, lateral and apical pleural surfaces A sputum specimen was recently collected due to ongoing cough with scant sputum production -> mycobacterium tuberculosis. The patient was also recently exposed to his wife with COVID and has tested positive of this infection. He was advised to come to the ER for evaluation and treatment. He has no shortness of breath or hypoxemia on room air. His chronic cough is no worse than usual and is associated with a post nasal drip. He has no chest congestion or wheeze. He has no fevers, chills or sweats. No chest pain/pressure or palpitations. His appetite is good and he has not lost weight.    right upper lobe active tuberculosis    COVID-19 infection without dyspnea or hypoxemia    chronic cough due to post nasal drip syndrome now exacerbated by lung infection    mild interstitial lung disease and emphysema    PLAN/RECOMMENDATIONS:    stable oxygenation on room air  airborne and contact isolation  chest CT to better evaluate the lung parenchyma  no indication for nebs or antibacterial antibiotics  robitussin DM/tessalon  claritin/flonase  not a candidate for monoclonal antibodies in the inpatient setting  does not meet criteria for remdesivir or steroids  AFB smear is positive -> will need isolation until 2 weeks of anti mycobacterial therapy  ID evaluation for initiation of RIPE awaiting sensitivities of the AFB at the department of health  diligent DVT prophylaxis    Thank you for the courtesy of this referral. Plan of care discussed with the patient at bedside     Ramakrishna Russell MD, [facility]  [phone]  Pulmonary Medicine       ASSESSMENT:    66 year old gentleman, current smoker, with no known history of intrinsic lung disease. He has a history of HTN, CAD s/p PCI and MI with preserved LVEF, urinary retention s/p cystoscopy in 2019 for intervention on a urethral stricture and chronic appendicitis s/p laparoscopic resection in 2020. He has a chronic left parotid mass. He was seen in March by Dr. Tylor Polk in follow-up. Biopsy of this mass and a nearby node was negative for malignancy many years ago. 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 The patient is followed in the outpatient setting for pulmonary nodules. He developed a pulmonary infiltrate with mediastinal adenopathy earlier this year. Bronchoscopy with subcarinal and left hilar node biopsies was negative for neoplasm and granulomatous inflammation. The right hilar node was not biopsied due to proximity to the pulmonary artery. Sampling of the right upper lobe lesion was also without neoplasm. AFB cultures have been negative x 8 weeks. Follow-up chest CT on March 29th -> patent central airways - stable peribronchial thickening with fine peripheral groundglass reticular opacities c/w mild interstitial lung disease - stable mild emphysema - stable right upper lobe granuloma - progression of the patchy branching right apical consolidation with a tree in bud configuration extending to the medial, lateral and apical pleural surfaces A sputum specimen was recently collected due to ongoing cough with scant sputum production -> mycobacterium tuberculosis. The patient was also recently exposed to his wife with COVID and has tested positive of this infection. He was advised to come to the ER for evaluation and treatment. He has no shortness of breath or hypoxemia on room air. His chronic cough is no worse than usual and is associated with a post nasal drip. He has no chest congestion or wheeze. He has no fevers, chills or sweats. No chest pain/pressure or palpitations. His appetite is good and he has not lost weight.    right upper lobe active tuberculosis    COVID-19 infection without dyspnea or hypoxemia    chronic cough due to post nasal drip syndrome now exacerbated by lung infection    mild interstitial lung disease and emphysema    PLAN/RECOMMENDATIONS:    stable oxygenation on room air  airborne and contact isolation  chest CT to better evaluate the lung parenchyma  no indication for nebs or antibacterial antibiotics  robitussin DM/tessalon  claritin/flonase  not a candidate for monoclonal antibodies in the inpatient setting  does not meet criteria for remdesivir or steroids  AFB smear is positive -> will need isolation until 2 weeks of anti mycobacterial therapy  ID evaluation for initiation of RIPE awaiting sensitivities of the AFB at the department of health  cardiac meds: ASA/plavix/enalapril/zocor  diligent DVT prophylaxis - SQ lovenox 40mg SQ daily    Thank you for the courtesy of this referral. Plan of care discussed with the patient at bedside. Will discuss with the hospitalist - the patient is asking for Dr. Travis Kirk to assume his care    Ramakrishna Russell MD, [facility]  [phone]  Pulmonary Medicine       ASSESSMENT:    66 year old gentleman, current smoker, with no known history of intrinsic lung disease. He has a history of HTN, CAD s/p PCI and MI with preserved LVEF, urinary retention s/p cystoscopy in 2019 for intervention on a urethral stricture and chronic appendicitis s/p laparoscopic resection in 2020. He has a chronic left parotid mass. He was seen in March by Dr. Tylor Polk in follow-up. Biopsy of this mass and a nearby node was negative for malignancy many years ago. 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 The patient is followed in the outpatient setting for pulmonary nodules. He developed a pulmonary infiltrate with mediastinal adenopathy earlier this year. Bronchoscopy with subcarinal and left hilar node biopsies was negative for neoplasm and granulomatous inflammation. The right hilar node was not biopsied due to proximity to the pulmonary artery. Sampling of the right upper lobe lesion was also without neoplasm. AFB cultures have been negative x 8 weeks. Follow-up chest CT on March 29th -> patent central airways - stable peribronchial thickening with fine peripheral groundglass reticular opacities c/w mild interstitial lung disease - stable mild emphysema - stable right upper lobe granuloma - progression of the patchy branching right apical consolidation with a tree in bud configuration extending to the medial, lateral and apical pleural surfaces A sputum specimen was recently collected due to ongoing cough with scant sputum production -> mycobacterium tuberculosis. The patient was also recently exposed to his wife with COVID and has tested positive of this infection. He was advised to come to the ER for evaluation and treatment. He has no shortness of breath or hypoxemia on room air. His chronic cough is no worse than usual and is associated with a post nasal drip. He has no chest congestion or wheeze. He has no fevers, chills or sweats. No chest pain/pressure or palpitations. His appetite is good and he has not lost weight.    right upper lobe active tuberculosis    COVID-19 infection without dyspnea or hypoxemia    chronic cough due to post nasal drip syndrome now exacerbated by lung infections    mild interstitial lung disease and emphysema    PLAN/RECOMMENDATIONS:    stable oxygenation on room air  airborne and contact isolation  chest CT to better evaluate the lung parenchyma  no indication for nebs or antibacterial antibiotics  robitussin DM/tessalon  claritin/flonase  not a candidate for monoclonal antibodies in the inpatient setting  does not meet criteria for remdesivir or steroids  AFB smear is positive -> will need isolation until 2 weeks of anti mycobacterial therapy  ID evaluation for initiation of RIPE awaiting sensitivities of the AFB at the department of health  cardiac meds: ASA/plavix/enalapril/zocor  diligent DVT prophylaxis - SQ lovenox 40mg SQ daily    Thank you for the courtesy of this referral. Plan of care discussed with the patient at bedside. Will discuss with the hospitalist - the patient is asking for Dr. Travis Kirk to assume his care    Ramakrishna Russell MD, [facility]  [phone]  Pulmonary Medicine       ASSESSMENT:    HPI: 66 year old gentleman, current smoker, with no known history of intrinsic lung disease. He has a history of HTN, CAD s/p PCI and MI with preserved LVEF, urinary retention s/p cystoscopy in 2019 for intervention on a urethral stricture and chronic appendicitis s/p laparoscopic resection in 2020. He has a chronic left parotid mass. He was seen in March by Dr. Tylor Polk in follow-up. Biopsy of this mass and a nearby node was negative for malignancy many years ago. 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 The patient is followed in the outpatient setting for pulmonary nodules. He developed a pulmonary infiltrate with mediastinal adenopathy earlier this year. Bronchoscopy with subcarinal and left hilar node biopsies was negative for neoplasm and granulomatous inflammation. The right hilar node was not biopsied due to proximity to the pulmonary artery. Sampling of the right upper lobe lesion was also without neoplasm. AFB cultures have been negative x 8 weeks. Follow-up chest CT on March 29th -> patent central airways - stable peribronchial thickening with fine peripheral groundglass reticular opacities c/w mild interstitial lung disease - stable mild emphysema - stable right upper lobe granuloma - progression of the patchy branching right apical consolidation with a tree in bud configuration extending to the medial, lateral and apical pleural surfaces A sputum specimen was recently collected due to ongoing cough with scant sputum production -> mycobacterium tuberculosis. The patient was also recently exposed to his wife with COVID and has tested positive of this infection. He was advised to come to the ER for evaluation and treatment. He is demanding treatment with monoclonal antibodies or will "sign out". He has no shortness of breath or hypoxemia on room air. His chronic cough is no worse than usual and is associated with a post nasal drip. He has no chest congestion or wheeze. He has no fevers, chills or sweats. No chest pain/pressure or palpitations. His appetite is good and he has not lost weight.     right upper lobe active tuberculosis    COVID-19 infection without dyspnea or hypoxemia    chronic cough due to post nasal drip syndrome now exacerbated by lung infections    mild interstitial lung disease and emphysema    PLAN/RECOMMENDATIONS:    stable oxygenation on room air  airborne and contact isolation  chest CT to better evaluate the lung parenchyma ordered  no indication for nebs or antibacterial antibiotics  robitussin DM/tessalon  claritin/flonase  ID evaluation noted     not a candidate for monoclonal antibodies - risk factors for progression of COVID disease include emphysema, hypertension, coronary artery diseaes and age - however he is vaccinated with MODERNA x 4 having received a second booster on 3/20/22 and the current omicron strains is much less virulent than prior strains     does not meet criteria for remdesivir or steroids     AFB smear is positive -> will need isolation until 2 weeks of anti mycobacterial therapy     started on INH/rifampin/ethambutol/pyrazinamide/B6 following LFTs and respiratory status  cardiac meds: ASA/plavix/enalapril/zocor  diligent DVT prophylaxis - SQ lovenox 40mg SQ daily      Thank you for the courtesy of this referral. Plan of care discussed with the patient at bedside. Will discuss with the hospitalist - the patient is asking for Dr. rTavis Kirk to assume his care    Ramakrishna Russell MD, [facility]  [phone]  Pulmonary Medicine

## 2022-05-11 NOTE — CONSULT NOTE ADULT - CONSULT REASON
tuberculosis; COVID-19 infection; former smoker abnormal chest CT; tuberculosis; COVID-19 infection; interstitial lung disease; COPD/emphysema; pulmonary granuloma; smoker

## 2022-05-11 NOTE — H&P ADULT - PROBLEM SELECTOR PLAN 1
- CXR clear and normal SpO2 on room air  - consult ID about possible monoclonal antibody infusion  - hold off on remdesivir and decadron

## 2022-05-11 NOTE — H&P ADULT - PROBLEM SELECTOR PLAN 5
- lovenox due to covid infection  - dash diet    BPH - at home takes alfuzosin, here only have tamsulosin

## 2022-05-11 NOTE — H&P ADULT - PROBLEM SELECTOR PLAN 2
-being worked up as outpatient for persistent cough, has had bronchoscopy w/normal biopsies  -f/u pulmonary consult, Dr. Russell

## 2022-05-11 NOTE — CONSULT NOTE ADULT - ASSESSMENT
66M with  coronary artery disease, acute inferior wall MI s/p KEVIN to RCA (2007), hypertension, hyperlipidemia, tobacco use, emphsema depression, and esophageal reflux, admitted 5/11/22 with 5/5/22 sputum yiedling Mycobacterium tuberculosis identified by PCR  He is COVID+    He is well apart from intermittent cough. Risk factors for severe covid include emphysema, HTN, CAD, age and gender, but he is vaccinated with MODERNA x4 - second booster on 3/20/22 and current omicron strain has been associated with much less virulence in vaccinated recipients.   Mtb treatment is priority.   Particularly important to address Mtb with small but significant risk of covid progression and requirement for steroids.  Will hold Remdisivir to assess tolerance of RIPE    stated weight 190#  potential adverse effects disucss    Suggest   mg daily  Rifampin 600 mg daily  Ethambutol 1200 daily  Pyrazinamide 1500 mg daily  B6 50 mg daily  monitor oxygenation status, lfts

## 2022-05-11 NOTE — CONSULT NOTE ADULT - SUBJECTIVE AND OBJECTIVE BOX
Patient is a 66y old  Male who presents with a chief complaint of +sputum culture (11 May 2022 09:09)    HPI:  66M w/pmh coronary artery disease, acute inferior wall MI s/p KEVIN to RCA (), hypertension, hyperlipidemia, tobacco use, depression, and esophageal reflux, admitted 22 to [facility] for positive sputum culture. He has been getting worked up as an outpatient for a persistent cough; he had a bronchoscopy done a few weeks ago w/biopsies which were negative for malignancy. A sputum culture was obtained which came back positive for Mycobacterium tuberculosis last week. The patient is feeling well overall. No fevers/chills/diarrhea/shortness of breath/chest pain. Wondering if he can get monoclonal antibody treatment today.  (11 May 2022 09:09)    Patient states his bronchoscopy in  was negative  22 sputum growing Mtb without RIF gene for resistance  HIs wife has covid - he tested himself and was positive.  no known TB exposure -- born in [place]    He feels well  His has chronic cough which is intermittent - has not been worse recently  no sob at rest  COVID VACCINATION x4  all MODERNA - most recent 3/20/22    PAST MEDICAL & SURGICAL HISTORY:  HTN (hypertension)  CAD S/P percutaneous coronary angioplasty and DSE placement in RCA    Heart attack - inferios wall    Back pain    H/O urethral stricture  2020  S/P rotator cuff surgery  left 2017  CAD S/P percutaneous coronary angioplasty  2 stents Taxus   S/P tonsillectomy  S/P cystoscopy  urethrotomy 2020 elective lap appendectomy after acute appendicitis 10/29/20    Social history:  , 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 No nausea, vomiting, or hematemesis; No diarrhea or constipation. No melena or hematochezia.  GENITOURINARY: No dysuria, frequency or hematuria  NEUROLOGICAL: No numbness or weakness  SKIN: No itching, burning, rashes, or lesions   All other review of systems is negative unless indicated above    Allergies  No Known Allergies    Antimicrobials:      Vital Signs Last 24 Hrs  T(C): 36.3 (11 May 2022 04:00), Max: 36.8 (10 May 2022 22:04)  T(F): 97.4 (11 May 2022 04:00), Max: 98.3 (10 May 2022 22:04)  HR: 77 (11 May 2022 04:00) (60 - 77)  BP: 108/68 (11 May 2022 04:00) (108/68 - 132/70)  BP(mean): --  RR: 18 (11 May 2022 04:00) (18 - 18)  SpO2: 97% (11 May 2022 04:00) (95% - 98%)    PHYSICAL EXAM:  General: WN/WD NAD, Non-toxic  hoarse voice  Neurology: A&Ox3, nonfocal  Respiratory: Clear to auscultation bilaterally  CV: RRR, S1S2, no murmurs, rubs or gallops  Abdominal: Soft, Non-tender, non-distended, normal bowel sounds  Extremities: No edema,   Line Sites: Clear  Skin: No rash                        12.1   8.06  )-----------( 288      ( 10 May 2022 23:08 )             37.8       05-10    139  |  104  |  16  ----------------------------<  93  4.0   |  22  |  0.81    Ca    9.6      10 May 2022 23:08    TPro  7.5  /  Alb  4.5  /  TBili  0.5  /  DBili  x   /  AST  20  /  ALT  21  /  AlkPhos  80  05-10    MICROBIOLOGY:  (05.10.22 @ 23:07) Rapid RVP Result: Detected     Radiology:  rad< from: Xray Chest 1 View- PORTABLE-Urgent (Xray Chest 1 View- PORTABLE-Urgent .) (05.10.22 @ 23:09) >  FINDINGS:    Clear lungs. No pleural effusion or pneumothorax.  The heart is not enlarged.  No acute osseous abnormality.    IMPRESSION:  Clear lungs    < end of copied text >  < from: CT Abdomen and Pelvis w/ Oral Cont and w/ IV Cont (12.08.20 @ 16:00) >    LOWER CHEST: Within normal limits.    LIVER: Within normal limits.  BILE DUCTS: Normal caliber.  GALLBLADDER: Within normal limits.  SPLEEN: Within normal limits.  PANCREAS: Within normal limits.  ADRENALS: Within normal limits.  KIDNEYS/URETERS: Cysts.    BLADDER: Within normal limits.  REPRODUCTIVE ORGANS: Within normal limits.    BOWEL: No bowel obstruction. The appendix is unremarkable. Appendicitis is resolved.  PERITONEUM: No ascites.  VESSELS:  Retroaortic left renal vein.  RETROPERITONEUM/LYMPH NODES: No lymphadenopathy.  ABDOMINAL WALL: Within normal limits.  BONES: Within normal limits.    IMPRESSION: Appendicitis is resolved.    < end of copied text >  < from: CT Chest No Cont (17 @ 15:45) >  IMPRESSION:    Emphysema.     Hazy bilateral peripheral opacities, most pronounced at the lung bases,   which have slightly progressed as compared with the prior study from   . These findings are nonspecific and may represent interstitial lung   disease. New hazy opacity in the lingula may be related to the same   process or may be infectious or postinfectious in etiology. Correlation   with clinical data and follow-up is suggested.    Otherwise, no significant interval change as compared with the prior   study from 2013.    < end of copied text >      Everardo Gracia MD; Division of Infectious Disease; Pager: [phone]; nights and weekends: [phone]

## 2022-05-11 NOTE — H&P ADULT - ASSESSMENT
66M w/pmh coronary artery disease, acute inferior wall MI s/p KEVIN to RCA (2007), hypertension, hyperlipidemia, tobacco use, depression, and esophageal reflux, presents to [facility] for positive sputum culture for Mycobacterium tuberculosis.

## 2022-05-11 NOTE — H&P ADULT - NSHPPHYSICALEXAM_GEN_ALL_CORE
Vital Signs Last 24 Hrs  T(C): 36.3 (11 May 2022 04:00), Max: 36.8 (10 May 2022 22:04)  T(F): 97.4 (11 May 2022 04:00), Max: 98.3 (10 May 2022 22:04)  HR: 77 (11 May 2022 04:00) (60 - 77)  BP: 108/68 (11 May 2022 04:00) (108/68 - 132/70)  BP(mean): --  RR: 18 (11 May 2022 04:00) (18 - 18)  SpO2: 97% (11 May 2022 04:00) (95% - 98%)    CONSTITUTIONAL: Well-groomed, in no apparent distress  EYES: No conjunctival or scleral injection, non-icteric; PERRLA and symmetric  ENMT: No external nasal lesions; no pharyngeal injection or exudates, oral mucosa with moist membranes  NECK: Trachea midline without palpable neck mass; thyroid not enlarged and non-tender  RESPIRATORY: Breathing comfortably; lungs CTA without wheeze/rhonchi/rales  CARDIOVASCULAR: +S1S2, RRR, no M/G/R; pedal pulses full and symmetric; no lower extremity edema  GASTROINTESTINAL: No palpable masses or tenderness, +BS throughout, no rebound/guarding; no hepatosplenomegaly; no hernia palpated  MUSCULOSKELETAL: no digital clubbing or cyanosis; no paraspinal tenderness; normal strength and tone of extremities  NEUROLOGIC: CN II-XII intact; sensation intact in LEs b/l to light touch  PSYCHIATRIC: A+O x 3; mood and affect appropriate; appropriate insight and judgment

## 2022-05-11 NOTE — H&P ADULT - NSHPREVIEWOFSYSTEMS_GEN_ALL_CORE
REVIEW OF SYSTEMS:    CONSTITUTIONAL: No weakness, weight loss, fevers or chills  EYES/ENT: No visual changes;  No vertigo or throat pain   NECK: No pain or stiffness  RESPIRATORY: No cough, wheezing, hemoptysis; No shortness of breath  CARDIOVASCULAR: No chest pain or palpitations, GRACIA  GASTROINTESTINAL: No abdominal or epigastric pain. No nausea, vomiting, or hematemesis; No diarrhea or constipation. No melena or hematochezia.  GENITOURINARY: No dysuria, frequency or hematuria  NEUROLOGICAL: No numbness or weakness, AAOX3  SKIN: No itching, rashes  MUSCULOSKELETAL: no joint erythema, no joint swelling  PSYCHIATRIC: no depression, no anxiety

## 2022-05-11 NOTE — H&P ADULT - HISTORY OF PRESENT ILLNESS
66M w/pmh coronary artery disease, acute inferior wall MI s/p KEVIN to RCA (2007), hypertension, hyperlipidemia, tobacco use, depression, and esophageal reflux, presents to [facility] for positive sputum culture. He has been getting worked up as an outpatient for a persistent cough; he had a bronchoscopy done a few weeks ago w/biopsies which were negative for malignancy. A sputum culture was obtained which came back positive for Mycobacterium tuberculosis last week. The patient is feeling well overall. No fevers/chills/diarrhea/shortness of breath/chest pain. Wondering if he can get monoclonal antibody treatment today.

## 2022-05-11 NOTE — H&P ADULT - NSHPADDITIONALINFOADULT_GEN_ALL_CORE
Detail Level: Zone
Plan discussed with ACP Eddie Olson, DO  Available on Teams ryan    Possible discharge home today if pulmonary and ID are okay with it.

## 2022-05-11 NOTE — ED ADULT NURSE REASSESSMENT NOTE - NS ED NURSE REASSESS COMMENT FT1
Pt A+Ox4, VSS, aware of plan of care. Pt admitted to medicine for TB and COVID. Report given to KING Zheng on 9 Monti.

## 2022-05-11 NOTE — CONSULT NOTE ADULT - SUBJECTIVE AND OBJECTIVE BOX
[facility] - [facility] - CONSULT NOTE    HPI: 66 year old gentleman, current smoker, with no known history of intrinsic lung disease. He has a history of HTN, CAD s/p PCI and MI, urinary retention s/p cystoscopy in 2019 for intervention on a urethral stricture and chronic appendicitis s/p laparoscopic resection in 2020. The patient is followed in the outpatient setting for lung nodules. A sputum collection was recently collected due to increasing cough with sputum production -> mycobacterium tuberculosis. The patient was also recently exposed to his wife with COVID and has tested positive of this infection.    PMHX:  HTN (hypertension)  CAD (coronary artery disease  MI (myocardial infarction)  Chronic back pain  Urethral stricture  Appendicitis   Shingles    PSHX:  Rotator cuff surgery  Percutaneous coronary angioplasty  Tonsillectomy  Cystoscopy for intervention on a urethral stricture  Appendectomy      FAMILY HISTORY:  mother - breast cancer - lung cancer  father - CAD/MI    SOCIAL HISTORY:  current smoker    Pulmonary Medications:       Antimicrobials:      Cardiology:      Other:  chlorhexidine 2% Cloths 1 Application(s) Topical daily      Prn:  MEDICATIONS  (PRN):      Allergies    No Known Allergies    HOME MEDICATIONS: see  H & P    REVIEW OF SYSTEMS:  Constitutional: As per HPI  HEENT: Within normal limits  CV: As per HPI  Resp: As per HPI  GI: Within normal limits   : Within normal limits  Musculoskeletal: Within normal limits  Skin: Within normal limits  Neurological: Within normal limits  Psychiatric: Within normal limits  Endocrine: Within normal limits  Hematologic/Lymphatic: Within normal limits  Allergic/Immunologic: Within normal limits    [x] All other systems negative    OBJECTIVE:    Daily Height in cm: 177.8 (10 May 2022 22:04)      PHYSICAL EXAM:  ICU Vital Signs Last 24 Hrs  T(C): 36.3 (11 May 2022 04:00), Max: 36.8 (10 May 2022 22:04)  T(F): 97.4 (11 May 2022 04:00), Max: 98.3 (10 May 2022 22:04)  HR: 77 (11 May 2022 04:00) (60 - 77)  BP: 108/68 (11 May 2022 04:00) (108/68 - 132/70)  BP(mean): --  ABP: --  ABP(mean): --  RR: 18 (11 May 2022 04:00) (18 - 18)  SpO2: 97% (11 May 2022 04:00) (95% - 98%)    General: Awake. Alert. Cooperative. No distress. Appears stated age 	  HEENT:   Atraumatic. Normocephalic. Anicteric. Normal oral mucosa. PERRL. EOMI.  Neck: Supple. Trachea midline. Thyroid without enlargement/tenderness/nodules. No carotid bruit. No JVD.	  Cardiovascular: Regular rate and rhythm. S1 S2 normal. No murmurs, rubs or gallops.  Respiratory: Respirations unlabored. Clear to auscultation and percussion bilaterally. No curvature.  Abdomen: Soft. Non-tender. Non-distended. No organomegaly. No masses. Normal bowel sounds.  Extremities: Warm to touch. No clubbing or cyanosis. No pedal edema.  Pulses: 2+ peripheral pulses all extremities.	  Skin: Normal skin color. No rashes or lesions. No ecchymoses. No cyanosis. Warm to touch.  Lymph Nodes: Cervical, supraclavicular and axillary nodes normal  Neurological: Motor and sensory examination equal and normal. A and O x 3  Psychiatry: Appropriate mood and affect.      LABS:                          12.1   8.06  )-----------( 288      ( 10 May 2022 23:08 )             37.8     CBC    WBC  8.06 <==    Hemoglobin  12.1 <<==    Hematocrit  37.8 <==    Platelets  288 <==      139  |  104  |  16  ----------------------------<  93    05-10  4.0   |  22  |  0.81    LYTES    sodium  139 <==    potassium   4.0 <==    chloride  104 <==    carbon dioxide  22 <==    =============================================================================================  RENAL FUNCTION:    Creatinine:   0.81  <<==    BUN:   16 <==    ============================================================================================    calcium   9.6 <==    ============================================================================================  LFTs    AST:   20 <==     ALT:  21  <==     AP:  80  <=    Bili:  0.5  <=    PT/INR - ( 10 May 2022 23:08 )   PT: 13.9 sec;   INR: 1.21 ratio       PTT - ( 10 May 2022 23:08 )  PTT:29.8 sec    MICROBIOLOGY:       RADIOLOGY:  [x ] Chest radiographs reviewed and interpreted by me           [facility] - [facility] - CONSULT NOTE    HPI: 66 year old gentleman, current smoker, with no known history of intrinsic lung disease. He has a history of HTN, CAD s/p PCI and MI, urinary retention s/p cystoscopy in 2019 for intervention on a urethral stricture and chronic appendicitis s/p laparoscopic resection in 2020. He was seen in March by Dr. Tylor Polk in follow-up for an FDG avid left parotid lesion. Biopsy of this mass and a nearby node was negative for malignancy. 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 The patient is followed in the outpatient setting for a FDG avid right upper lobe lung nodule. He developed a pulmonary infiltrate with mediastinal adenopathy earlier this year. Bronchoscopy with subcarinal and left hilar node biopsies was negative for neoplasm and granulomatous inflammation. The right hilar node was not biopsied due to proximity to the pulmonary artery. Sampling of the right upper lobe lesion was also without neoplasm. A sputum collection was recently collected due to increasing cough with sputum production -> mycobacterium tuberculosis. The patient was also recently exposed to his wife with COVID and has tested positive of this infection. He was advised to come to the ER for evaluation and treatment.    PMHX:  HTN (hypertension)  CAD (coronary artery disease  MI (myocardial infarction)  GERD (gastroesophageal reflux disease)  Chronic back pain  Urethral stricture  Appendicitis   Shingles  Depression  BRCA + gene mutation  Basal cell skin cancer    PSHX:  Rotator cuff surgery  Percutaneous coronary angioplasty  Tonsillectomy  Cystoscopy for intervention on a urethral stricture  Appendectomy  Bronchoscopy - 2/2022      FAMILY HISTORY:  mother - breast cancer - lung cancer  father - CAD/MI  sister - breast cancer    SOCIAL HISTORY:  current smoker; ; worked at the [employer]    Pulmonary Medications:       Antimicrobials:      Cardiology:      Other:  chlorhexidine 2% Cloths 1 Application(s) Topical daily      Prn:  MEDICATIONS  (PRN):      Allergies    No Known Allergies    HOME MEDICATIONS: see  H & P    REVIEW OF SYSTEMS:  Constitutional: As per HPI  HEENT: Within normal limits  CV: As per HPI  Resp: As per HPI  GI: Within normal limits   : Within normal limits  Musculoskeletal: Within normal limits  Skin: Within normal limits  Neurological: Within normal limits  Psychiatric: Within normal limits  Endocrine: Within normal limits  Hematologic/Lymphatic: Within normal limits  Allergic/Immunologic: Within normal limits    [x] All other systems negative    OBJECTIVE:    Daily Height in cm: 177.8 (10 May 2022 22:04)      PHYSICAL EXAM:  ICU Vital Signs Last 24 Hrs  T(C): 36.3 (11 May 2022 04:00), Max: 36.8 (10 May 2022 22:04)  T(F): 97.4 (11 May 2022 04:00), Max: 98.3 (10 May 2022 22:04)  HR: 77 (11 May 2022 04:00) (60 - 77)  BP: 108/68 (11 May 2022 04:00) (108/68 - 132/70)  BP(mean): --  ABP: --  ABP(mean): --  RR: 18 (11 May 2022 04:00) (18 - 18)  SpO2: 97% (11 May 2022 04:00) (95% - 98%)    General: Awake. Alert. Cooperative. No distress. Appears stated age 	  HEENT:   Atraumatic. Normocephalic. Anicteric. Normal oral mucosa. PERRL. EOMI.  Neck: Supple. Trachea midline. Thyroid without enlargement/tenderness/nodules. No carotid bruit. No JVD.	  Cardiovascular: Regular rate and rhythm. S1 S2 normal. No murmurs, rubs or gallops.  Respiratory: Respirations unlabored. Clear to auscultation and percussion bilaterally. No curvature.  Abdomen: Soft. Non-tender. Non-distended. No organomegaly. No masses. Normal bowel sounds.  Extremities: Warm to touch. No clubbing or cyanosis. No pedal edema.  Pulses: 2+ peripheral pulses all extremities.	  Skin: Normal skin color. No rashes or lesions. No ecchymoses. No cyanosis. Warm to touch.  Lymph Nodes: Cervical, supraclavicular and axillary nodes normal  Neurological: Motor and sensory examination equal and normal. A and O x 3  Psychiatry: Appropriate mood and affect.      LABS:                          12.1   8.06  )-----------( 288      ( 10 May 2022 23:08 )             37.8     CBC    WBC  8.06 <==    Hemoglobin  12.1 <<==    Hematocrit  37.8 <==    Platelets  288 <==      139  |  104  |  16  ----------------------------<  93    05-10  4.0   |  22  |  0.81    LYTES    sodium  139 <==    potassium   4.0 <==    chloride  104 <==    carbon dioxide  22 <==    =============================================================================================  RENAL FUNCTION:    Creatinine:   0.81  <<==    BUN:   16 <==    ============================================================================================    calcium   9.6 <==    ============================================================================================  LFTs    AST:   20 <==     ALT:  21  <==     AP:  80  <=    Bili:  0.5  <=    PT/INR - ( 10 May 2022 23:08 )   PT: 13.9 sec;   INR: 1.21 ratio       PTT - ( 10 May 2022 23:08 )  PTT:29.8 sec    MICROBIOLOGY:       RADIOLOGY:  [x ] Chest radiographs reviewed and interpreted by me           [facility] - [facility] - CONSULT NOTE    HPI: 66 year old gentleman, current smoker, with no known history of intrinsic lung disease. He has a history of HTN, CAD s/p PCI and MI with preserved LVEF, urinary retention s/p cystoscopy in 2019 for intervention on a urethral stricture and chronic appendicitis s/p laparoscopic resection in 2020. He has a chronic left parotid mass. He was seen in March by Dr. Tylor Polk in follow-up. Biopsy of this mass and a nearby node was negative for malignancy many years ago. 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 The patient is followed in the outpatient setting for pulmonary nodules. He developed a pulmonary infiltrate with mediastinal adenopathy earlier this year. Bronchoscopy with subcarinal and left hilar node biopsies was negative for neoplasm and granulomatous inflammation. The right hilar node was not biopsied due to proximity to the pulmonary artery. Sampling of the right upper lobe lesion was also without neoplasm. AFB cultures have been negative x 8 weeks. Follow-up chest CT on March 29th -> patent central airways - stable peribronchial thickening with fine peripheral groundglass reticular opacities c/w mild interstitial lung disease - stable mild emphysema - stable right upper lobe granuloma - progression of the patchy branching right apical consolidation with a tree in bud configuration extending to the medial, lateral and apical pleural surfaces A sputum specimen was recently collected due to ongoing cough with scant sputum production -> mycobacterium tuberculosis. The patient was also recently exposed to his wife with COVID and has tested positive of this infection. He was advised to come to the ER for evaluation and treatment. He has no shortness of breath or hypoxemia on room air. His chronic cough is no worse than usual and is associated with a post nasal drip. He has no chest congestion or wheeze. He has no fevers, chills or sweats. No chest pain/pressure or palpitations. His appetite is good and he has not lost weight. Asked to evaluate    PMHX:  COPD/emphysema  Interstitial lung disease (mild)  Chronic cough/PNDS  HTN (hypertension)  CAD (coronary artery disease  MI (myocardial infarction)  GERD (gastroesophageal reflux disease)  Chronic back pain  Urethral stricture  Appendicitis   Shingles  Depression  BRCA + gene mutation  Basal cell skin cancer    PSHX:  Rotator cuff surgery  Percutaneous coronary angioplasty  Tonsillectomy  Cystoscopy for intervention on a urethral stricture  Appendectomy  Bronchoscopy - 2/2022      FAMILY HISTORY:  mother - breast cancer - lung cancer  father - CAD/MI  sister - breast cancer    SOCIAL HISTORY:  current smoker;  - works with immigrants;  -> lives with his wife    Pulmonary Medications:       Antimicrobials:      Cardiology:      Other:  chlorhexidine 2% Cloths 1 Application(s) Topical daily      Prn:  MEDICATIONS  (PRN):      Allergies    No Known Allergies    HOME MEDICATIONS: see  H & P    REVIEW OF SYSTEMS:  Constitutional: As per HPI  HEENT: post nasal drip syndrome - parotid mass  CV: As per HPI  Resp: As per HPI  GI: Within normal limits   : Within normal limits  Musculoskeletal: Within normal limits  Skin: Within normal limits  Neurological: Within normal limits  Psychiatric: Within normal limits  Endocrine: Within normal limits  Hematologic/Lymphatic: Within normal limits  Allergic/Immunologic: Within normal limits    [x] All other systems negative    OBJECTIVE:    Daily Height in cm: 177.8 (10 May 2022 22:04)      PHYSICAL EXAM:  ICU Vital Signs Last 24 Hrs  T(C): 36.3 (11 May 2022 04:00), Max: 36.8 (10 May 2022 22:04)  T(F): 97.4 (11 May 2022 04:00), Max: 98.3 (10 May 2022 22:04)  HR: 77 (11 May 2022 04:00) (60 - 77)  BP: 108/68 (11 May 2022 04:00) (108/68 - 132/70)  BP(mean): --  ABP: --  ABP(mean): --  RR: 18 (11 May 2022 04:00) (18 - 18)  SpO2: 97% (11 May 2022 04:00) (95% - 98%) on room air    General: Awake. Alert. Cooperative. No distress. Appears stated age. Occasional cough	  HEENT: Atraumatic. Normocephalic. Anicteric. Normal oral mucosa. PERRL. EOMI. Left parotid mass.  Neck: Supple. Trachea midline. Thyroid without enlargement/tenderness/nodules. No carotid bruit. No JVD.	  Cardiovascular: Regular rate and rhythm. S1 S2 normal. No murmurs, rubs or gallops.  Respiratory: Respirations unlabored. Right sided rales. No curvature.  Abdomen: Soft. Non-tender. Non-distended. No organomegaly. No masses. Normal bowel sounds.  Extremities: Warm to touch. No clubbing or cyanosis. No pedal edema.  Pulses: 2+ peripheral pulses all extremities.	  Skin: Normal skin color. No rashes or lesions. No ecchymoses. No cyanosis. Warm to touch.  Lymph Nodes: Cervical, supraclavicular and axillary nodes normal  Neurological: Motor and sensory examination equal and normal. A and O x 3  Psychiatry: Appropriate mood and affect.      LABS:                          12.1   8.06  )-----------( 288      ( 10 May 2022 23:08 )             37.8     CBC    WBC  8.06 <==    Hemoglobin  12.1 <<==    Hematocrit  37.8 <==    Platelets  288 <==      139  |  104  |  16  ----------------------------<  93    05-10  4.0   |  22  |  0.81    LYTES    sodium  139 <==    potassium   4.0 <==    chloride  104 <==    carbon dioxide  22 <==    =============================================================================================  RENAL FUNCTION:    Creatinine:   0.81  <<==    BUN:   16 <==    ============================================================================================    calcium   9.6 <==    ============================================================================================  LFTs    AST:   20 <==     ALT:  21  <==     AP:  80  <=    Bili:  0.5  <=    PT/INR - ( 10 May 2022 23:08 )   PT: 13.9 sec;   INR: 1.21 ratio       PTT - ( 10 May 2022 23:08 )  PTT:29.8 sec    MICROBIOLOGY:     Respiratory Viral Panel with COVID-19 by [facility]SIUS (05.10.22 @ 23:07)   Rapid RVP Result: Detected   SARS-CoV-2: Detected  This Respiratory Panel uses polymerase chain reaction (PCR) to detect for   adenovirus; coronavirus (HKU1, NL63, 229E, OC43); human metapneumovirus   (hMPV); human enterovirus/rhinovirus (Entero/RV); influenza A; influenza   A/H1; influenza A/H3; influenza A/H1-2009; influenza B; parainfluenza   viruses 1, 2, 3, 4; respiratory syncytial virus; Mycoplasma pneumoniae;   Chlamydophila pneumoniae; and SARS-CoV-2.     RADIOLOGY:  [x ] Chest radiographs reviewed and interpreted by me           [facility] - [facility] - CONSULT NOTE    HPI: 66 year old gentleman, current smoker, with no known history of intrinsic lung disease. He has a history of HTN, CAD s/p PCI and MI with preserved LVEF, urinary retention s/p cystoscopy in 2019 for intervention on a urethral stricture and chronic appendicitis s/p laparoscopic resection in 2020. He has a chronic left parotid mass. He was seen in March by Dr. Tylor Polk in follow-up. Biopsy of this mass and a nearby node was negative for malignancy many years ago. 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 The patient is followed in the outpatient setting for pulmonary nodules. He developed a pulmonary infiltrate with mediastinal adenopathy earlier this year. Bronchoscopy with subcarinal and left hilar node biopsies was negative for neoplasm and granulomatous inflammation. The right hilar node was not biopsied due to proximity to the pulmonary artery. Sampling of the right upper lobe lesion was also without neoplasm. AFB cultures have been negative x 8 weeks. Follow-up chest CT on March 29th -> patent central airways - stable peribronchial thickening with fine peripheral groundglass reticular opacities c/w mild interstitial lung disease - stable mild emphysema - stable right upper lobe granuloma - progression of the patchy branching right apical consolidation with a tree in bud configuration extending to the medial, lateral and apical pleural surfaces A sputum specimen was recently collected due to ongoing cough with scant sputum production -> mycobacterium tuberculosis. The patient was also recently exposed to his wife with COVID and has tested positive of this infection. He was advised to come to the ER for evaluation and treatment. He is demanding treatment with monoclonal antibodies or will "sign out". He has no shortness of breath or hypoxemia on room air. His chronic cough is no worse than usual and is associated with a post nasal drip. He has no chest congestion or wheeze. He has no fevers, chills or sweats. No chest pain/pressure or palpitations. His appetite is good and he has not lost weight. Asked to evaluate    PMHX:  COPD/emphysema  Interstitial lung disease (mild)  Chronic cough/PNDS  HTN (hypertension)  CAD (coronary artery disease  MI (myocardial infarction)  GERD (gastroesophageal reflux disease)  Chronic back pain  Urethral stricture  Appendicitis   Shingles  Depression  BRCA + gene mutation  Basal cell skin cancer    PSHX:  Rotator cuff surgery  Percutaneous coronary angioplasty  Tonsillectomy  Cystoscopy for intervention on a urethral stricture  Appendectomy  Bronchoscopy - 2/2022      FAMILY HISTORY:  mother - breast cancer - lung cancer  father - CAD/MI  sister - breast cancer    SOCIAL HISTORY:  current smoker;  - works with immigrants;  -> lives with his wife    Pulmonary Medications:       Antimicrobials:      Cardiology:      Other:  chlorhexidine 2% Cloths 1 Application(s) Topical daily      Prn:  MEDICATIONS  (PRN):      Allergies    No Known Allergies    HOME MEDICATIONS: see  H & P    REVIEW OF SYSTEMS:  Constitutional: As per HPI  HEENT: post nasal drip syndrome - parotid mass  CV: As per HPI  Resp: As per HPI  GI: Within normal limits   : Within normal limits  Musculoskeletal: Within normal limits  Skin: Within normal limits  Neurological: Within normal limits  Psychiatric: Within normal limits  Endocrine: Within normal limits  Hematologic/Lymphatic: Within normal limits  Allergic/Immunologic: Within normal limits    [x] All other systems negative    OBJECTIVE:    Daily Height in cm: 177.8 (10 May 2022 22:04)      PHYSICAL EXAM:  ICU Vital Signs Last 24 Hrs  T(C): 36.3 (11 May 2022 04:00), Max: 36.8 (10 May 2022 22:04)  T(F): 97.4 (11 May 2022 04:00), Max: 98.3 (10 May 2022 22:04)  HR: 77 (11 May 2022 04:00) (60 - 77)  BP: 108/68 (11 May 2022 04:00) (108/68 - 132/70)  BP(mean): --  ABP: --  ABP(mean): --  RR: 18 (11 May 2022 04:00) (18 - 18)  SpO2: 97% (11 May 2022 04:00) (95% - 98%) on room air    General: Awake. Alert. Cooperative. No distress. Appears stated age. Occasional cough	  HEENT: Atraumatic. Normocephalic. Anicteric. Normal oral mucosa. PERRL. EOMI. Left parotid mass.  Neck: Supple. Trachea midline. Thyroid without enlargement/tenderness/nodules. No carotid bruit. No JVD.	  Cardiovascular: Regular rate and rhythm. S1 S2 normal. No murmurs, rubs or gallops.  Respiratory: Respirations unlabored. Right sided rales. No curvature.  Abdomen: Soft. Non-tender. Non-distended. No organomegaly. No masses. Normal bowel sounds.  Extremities: Warm to touch. No clubbing or cyanosis. No pedal edema.  Pulses: 2+ peripheral pulses all extremities.	  Skin: Normal skin color. No rashes or lesions. No ecchymoses. No cyanosis. Warm to touch.  Lymph Nodes: Cervical, supraclavicular and axillary nodes normal  Neurological: Motor and sensory examination equal and normal. A and O x 3  Psychiatry: Appropriate mood and affect.      LABS:                          12.1   8.06  )-----------( 288      ( 10 May 2022 23:08 )             37.8     CBC    WBC  8.06 <==    Hemoglobin  12.1 <<==    Hematocrit  37.8 <==    Platelets  288 <==      139  |  104  |  16  ----------------------------<  93    05-10  4.0   |  22  |  0.81    LYTES    sodium  139 <==    potassium   4.0 <==    chloride  104 <==    carbon dioxide  22 <==    =============================================================================================  RENAL FUNCTION:    Creatinine:   0.81  <<==    BUN:   16 <==    ============================================================================================    calcium   9.6 <==    ============================================================================================  LFTs    AST:   20 <==     ALT:  21  <==     AP:  80  <=    Bili:  0.5  <=    PT/INR - ( 10 May 2022 23:08 )   PT: 13.9 sec;   INR: 1.21 ratio       PTT - ( 10 May 2022 23:08 )  PTT:29.8 sec    MICROBIOLOGY:     Respiratory Viral Panel with COVID-19 by [facility] (05.10.22 @ 23:07)   Rapid RVP Result: Detected   SARS-CoV-2: Detected  This Respiratory Panel uses polymerase chain reaction (PCR) to detect for   adenovirus; coronavirus (HKU1, NL63, 229E, OC43); human metapneumovirus   (hMPV); human enterovirus/rhinovirus (Entero/RV); influenza A; influenza   A/H1; influenza A/H3; influenza A/H1-2009; influenza B; parainfluenza   viruses 1, 2, 3, 4; respiratory syncytial virus; Mycoplasma pneumoniae;   Chlamydophila pneumoniae; and SARS-CoV-2.     RADIOLOGY:  [x ] Chest radiographs reviewed and interpreted by me           [facility] - [facility] - CONSULT NOTE    HPI: 66 year old gentleman, current smoker, with no known history of intrinsic lung disease. He has a history of HTN, CAD s/p PCI and MI with preserved LVEF, urinary retention s/p cystoscopy in 2019 for intervention on a urethral stricture and chronic appendicitis s/p laparoscopic resection in 2020. He has a chronic left parotid mass. He was seen in March by Dr. Tylor Polk in follow-up. Biopsy of this mass and a nearby node was negative for malignancy many years ago. 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 The patient is followed in the outpatient setting for pulmonary nodules. He developed a pulmonary infiltrate with mediastinal adenopathy earlier this year. Bronchoscopy with subcarinal and left hilar node biopsies was negative for neoplasm and granulomatous inflammation. The right hilar node was not biopsied due to proximity to the pulmonary artery. Sampling of the right upper lobe lesion was also without neoplasm. AFB cultures have been negative x 8 weeks. Follow-up chest CT on March 29th -> patent central airways - stable peribronchial thickening with fine peripheral groundglass reticular opacities c/w mild interstitial lung disease - stable mild emphysema - stable right upper lobe granuloma - progression of the patchy branching right apical consolidation with a tree in bud configuration extending to the medial, lateral and apical pleural surfaces A sputum specimen was recently collected due to ongoing cough with scant sputum production -> mycobacterium tuberculosis. The patient was also recently exposed to his wife with COVID and has tested positive of this infection. He was advised to come to the ER for evaluation and treatment. He is demanding treatment with monoclonal antibodies or will "sign out". He has no shortness of breath or hypoxemia on room air. His chronic cough is no worse than usual and is associated with a post nasal drip. He has no chest congestion or wheeze. He has no fevers, chills or sweats. No chest pain/pressure or palpitations. His appetite is good and he has not lost weight. Asked to evaluate    PMHX:  COPD/emphysema  Interstitial lung disease (mild)  Chronic cough/PNDS  HTN (hypertension)  CAD (coronary artery disease  MI (myocardial infarction)  GERD (gastroesophageal reflux disease)  Chronic back pain  Urethral stricture  Appendicitis   Shingles  Depression  BRCA + gene mutation  Basal cell skin cancer    PSHX:  Rotator cuff surgery  Percutaneous coronary angioplasty  Tonsillectomy  Cystoscopy for intervention on a urethral stricture  Appendectomy  Bronchoscopy - 2/2022      FAMILY HISTORY:  mother - breast cancer - lung cancer  father - CAD/MI  sister - breast cancer    SOCIAL HISTORY:  current smoker;  - works with immigrants;  -> lives with his wife    Pulmonary Medications:       Antimicrobials:      Cardiology:      Other:  chlorhexidine 2% Cloths 1 Application(s) Topical daily      Prn:  MEDICATIONS  (PRN):      Allergies    No Known Allergies    HOME MEDICATIONS: see  H & P    REVIEW OF SYSTEMS:  Constitutional: As per HPI  HEENT: post nasal drip syndrome - parotid mass  CV: As per HPI  Resp: As per HPI  GI: Within normal limits   : Within normal limits  Musculoskeletal: Within normal limits  Skin: Within normal limits  Neurological: Within normal limits  Psychiatric: Within normal limits  Endocrine: Within normal limits  Hematologic/Lymphatic: Within normal limits  Allergic/Immunologic: Within normal limits    [x] All other systems negative    OBJECTIVE:    Daily Height in cm: 177.8 (10 May 2022 22:04)      PHYSICAL EXAM:  ICU Vital Signs Last 24 Hrs  T(C): 36.3 (11 May 2022 04:00), Max: 36.8 (10 May 2022 22:04)  T(F): 97.4 (11 May 2022 04:00), Max: 98.3 (10 May 2022 22:04)  HR: 77 (11 May 2022 04:00) (60 - 77)  BP: 108/68 (11 May 2022 04:00) (108/68 - 132/70)  BP(mean): --  ABP: --  ABP(mean): --  RR: 18 (11 May 2022 04:00) (18 - 18)  SpO2: 97% (11 May 2022 04:00) (95% - 98%) on room air    General: Awake. Alert. Cooperative. No distress. Appears stated age. Occasional cough	  HEENT: Atraumatic. Normocephalic. Anicteric. Normal oral mucosa. PERRL. EOMI. Left parotid mass.  Neck: Supple. Trachea midline. Thyroid without enlargement/tenderness/nodules. No carotid bruit. No JVD.	  Cardiovascular: Regular rate and rhythm. S1 S2 normal. No murmurs, rubs or gallops.  Respiratory: Respirations unlabored. Right sided rales. No curvature.  Abdomen: Soft. Non-tender. Non-distended. No organomegaly. No masses. Normal bowel sounds.  Extremities: Warm to touch. No clubbing or cyanosis. No pedal edema.  Pulses: 2+ peripheral pulses all extremities.	  Skin: Normal skin color. No rashes or lesions. No ecchymoses. No cyanosis. Warm to touch.  Lymph Nodes: Cervical, supraclavicular and axillary nodes normal  Neurological: Motor and sensory examination equal and normal. A and O x 3  Psychiatry: Appropriate mood and affect.      LABS:                          12.1   8.06  )-----------( 288      ( 10 May 2022 23:08 )             37.8     CBC    WBC  8.06 <==    Hemoglobin  12.1 <<==    Hematocrit  37.8 <==    Platelets  288 <==      139  |  104  |  16  ----------------------------<  93    05-10  4.0   |  22  |  0.81    LYTES    sodium  139 <==    potassium   4.0 <==    chloride  104 <==    carbon dioxide  22 <==    =============================================================================================  RENAL FUNCTION:    Creatinine:   0.81  <<==    BUN:   16 <==    ============================================================================================    calcium   9.6 <==    ============================================================================================  LFTs    AST:   20 <==     ALT:  21  <==     AP:  80  <=    Bili:  0.5  <=    PT/INR - ( 10 May 2022 23:08 )   PT: 13.9 sec;   INR: 1.21 ratio       PTT - ( 10 May 2022 23:08 )  PTT:29.8 sec    MICROBIOLOGY:     Respiratory Viral Panel with COVID-19 by CASSIUS (05.10.22 @ 23:07)   Rapid RVP Result: Detected   SARS-CoV-2: Detected  This Respiratory Panel uses polymerase chain reaction (PCR) to detect for   adenovirus; coronavirus (HKU1, NL63, 229E, OC43); human metapneumovirus   (hMPV); human enterovirus/rhinovirus (Entero/RV); influenza A; influenza   A/H1; influenza A/H3; influenza A/H1-2009; influenza B; parainfluenza   viruses 1, 2, 3, 4; respiratory syncytial virus; Mycoplasma pneumoniae;   Chlamydophila pneumoniae; and SARS-CoV-2.     RADIOLOGY:  [x ] Chest radiographs reviewed and interpreted by me    EXAM:  XR CHEST PORTABLE URGENT 1V                          PROCEDURE DATE:  05/10/2022      FINDINGS:    There is a semicircular pleural-based density in the right apex.  There are no focal infiltrates.  No pleural effusion or pneumothorax.  The heart is not enlarged.  No acute osseous abnormality.    IMPRESSION:  Right apical pleural-based density which was not present on the prior   study. Correlation with chest CT is recommended.    DAVE ARRINGTON MD; Resident Radiology  This document has been electronically signed.  OTILIO AUGUSTINE MD; Attending Radiologist  This document has been electronically signed. May 11 2022  1:15PM  ---------------------------------------------------------------------------------------------------------------

## 2022-05-12 DIAGNOSIS — I95.1 ORTHOSTATIC HYPOTENSION: ICD-10-CM

## 2022-05-12 LAB
ALBUMIN SERPL ELPH-MCNC: 4.1 G/DL — SIGNIFICANT CHANGE UP (ref 3.3–5)
ALP SERPL-CCNC: 75 U/L — SIGNIFICANT CHANGE UP (ref 40–120)
ALT FLD-CCNC: 17 U/L — SIGNIFICANT CHANGE UP (ref 10–45)
ANION GAP SERPL CALC-SCNC: 10 MMOL/L — SIGNIFICANT CHANGE UP (ref 5–17)
AST SERPL-CCNC: 16 U/L — SIGNIFICANT CHANGE UP (ref 10–40)
BASOPHILS # BLD AUTO: 0.05 K/UL — SIGNIFICANT CHANGE UP (ref 0–0.2)
BASOPHILS NFR BLD AUTO: 0.8 % — SIGNIFICANT CHANGE UP (ref 0–2)
BILIRUB SERPL-MCNC: 1.3 MG/DL — HIGH (ref 0.2–1.2)
BUN SERPL-MCNC: 18 MG/DL — SIGNIFICANT CHANGE UP (ref 7–23)
CALCIUM SERPL-MCNC: 9 MG/DL — SIGNIFICANT CHANGE UP (ref 8.4–10.5)
CHLORIDE SERPL-SCNC: 105 MMOL/L — SIGNIFICANT CHANGE UP (ref 96–108)
CO2 SERPL-SCNC: 23 MMOL/L — SIGNIFICANT CHANGE UP (ref 22–31)
CREAT SERPL-MCNC: 0.9 MG/DL — SIGNIFICANT CHANGE UP (ref 0.5–1.3)
CRP SERPL-MCNC: 6 MG/L — HIGH (ref 0–4)
EGFR: 95 ML/MIN/1.73M2 — SIGNIFICANT CHANGE UP
EOSINOPHIL # BLD AUTO: 0.2 K/UL — SIGNIFICANT CHANGE UP (ref 0–0.5)
EOSINOPHIL NFR BLD AUTO: 3.3 % — SIGNIFICANT CHANGE UP (ref 0–6)
FERRITIN SERPL-MCNC: 227 NG/ML — SIGNIFICANT CHANGE UP (ref 30–400)
GAMMA INTERFERON BACKGROUND BLD IA-ACNC: 0.01 IU/ML — SIGNIFICANT CHANGE UP
GLUCOSE SERPL-MCNC: 95 MG/DL — SIGNIFICANT CHANGE UP (ref 70–99)
HCT VFR BLD CALC: 37.5 % — LOW (ref 39–50)
HGB BLD-MCNC: 12.2 G/DL — LOW (ref 13–17)
IMM GRANULOCYTES NFR BLD AUTO: 1.1 % — SIGNIFICANT CHANGE UP (ref 0–1.5)
LDH SERPL L TO P-CCNC: 99 U/L — SIGNIFICANT CHANGE UP (ref 50–242)
LYMPHOCYTES # BLD AUTO: 1.13 K/UL — SIGNIFICANT CHANGE UP (ref 1–3.3)
LYMPHOCYTES # BLD AUTO: 18.6 % — SIGNIFICANT CHANGE UP (ref 13–44)
M TB IFN-G BLD-IMP: POSITIVE
M TB IFN-G CD4+ BCKGRND COR BLD-ACNC: 0.43 IU/ML — SIGNIFICANT CHANGE UP
M TB IFN-G CD4+CD8+ BCKGRND COR BLD-ACNC: 0.55 IU/ML — SIGNIFICANT CHANGE UP
MAGNESIUM SERPL-MCNC: 2.1 MG/DL — SIGNIFICANT CHANGE UP (ref 1.6–2.6)
MCHC RBC-ENTMCNC: 30.5 PG — SIGNIFICANT CHANGE UP (ref 27–34)
MCHC RBC-ENTMCNC: 32.5 GM/DL — SIGNIFICANT CHANGE UP (ref 32–36)
MCV RBC AUTO: 93.8 FL — SIGNIFICANT CHANGE UP (ref 80–100)
MONOCYTES # BLD AUTO: 0.66 K/UL — SIGNIFICANT CHANGE UP (ref 0–0.9)
MONOCYTES NFR BLD AUTO: 10.8 % — SIGNIFICANT CHANGE UP (ref 2–14)
MRSA PCR RESULT.: SIGNIFICANT CHANGE UP
NEUTROPHILS # BLD AUTO: 3.98 K/UL — SIGNIFICANT CHANGE UP (ref 1.8–7.4)
NEUTROPHILS NFR BLD AUTO: 65.4 % — SIGNIFICANT CHANGE UP (ref 43–77)
NRBC # BLD: 0 /100 WBCS — SIGNIFICANT CHANGE UP (ref 0–0)
PHOSPHATE SERPL-MCNC: 2.6 MG/DL — SIGNIFICANT CHANGE UP (ref 2.5–4.5)
PLATELET # BLD AUTO: 283 K/UL — SIGNIFICANT CHANGE UP (ref 150–400)
POTASSIUM SERPL-MCNC: 5 MMOL/L — SIGNIFICANT CHANGE UP (ref 3.5–5.3)
POTASSIUM SERPL-SCNC: 5 MMOL/L — SIGNIFICANT CHANGE UP (ref 3.5–5.3)
PROT SERPL-MCNC: 6.8 G/DL — SIGNIFICANT CHANGE UP (ref 6–8.3)
QUANT TB PLUS MITOGEN MINUS NIL: 9.99 IU/ML — SIGNIFICANT CHANGE UP
RBC # BLD: 4 M/UL — LOW (ref 4.2–5.8)
RBC # FLD: 15 % — HIGH (ref 10.3–14.5)
S AUREUS DNA NOSE QL NAA+PROBE: DETECTED
SODIUM SERPL-SCNC: 138 MMOL/L — SIGNIFICANT CHANGE UP (ref 135–145)
WBC # BLD: 6.09 K/UL — SIGNIFICANT CHANGE UP (ref 3.8–10.5)
WBC # FLD AUTO: 6.09 K/UL — SIGNIFICANT CHANGE UP (ref 3.8–10.5)

## 2022-05-12 PROCEDURE — 72170 X-RAY EXAM OF PELVIS: CPT | Mod: 26

## 2022-05-12 PROCEDURE — 72125 CT NECK SPINE W/O DYE: CPT | Mod: 26

## 2022-05-12 PROCEDURE — 70450 CT HEAD/BRAIN W/O DYE: CPT | Mod: 26

## 2022-05-12 PROCEDURE — 73070 X-RAY EXAM OF ELBOW: CPT | Mod: 26,50

## 2022-05-12 PROCEDURE — 99232 SBSQ HOSP IP/OBS MODERATE 35: CPT

## 2022-05-12 PROCEDURE — 93010 ELECTROCARDIOGRAM REPORT: CPT

## 2022-05-12 RX ORDER — SODIUM CHLORIDE 9 MG/ML
1000 INJECTION INTRAMUSCULAR; INTRAVENOUS; SUBCUTANEOUS
Refills: 0 | Status: DISCONTINUED | OUTPATIENT
Start: 2022-05-12 | End: 2022-05-13

## 2022-05-12 RX ORDER — SODIUM CHLORIDE 9 MG/ML
1000 INJECTION INTRAMUSCULAR; INTRAVENOUS; SUBCUTANEOUS
Refills: 0 | Status: DISCONTINUED | OUTPATIENT
Start: 2022-05-12 | End: 2022-05-12

## 2022-05-12 RX ORDER — SODIUM CHLORIDE 9 MG/ML
500 INJECTION INTRAMUSCULAR; INTRAVENOUS; SUBCUTANEOUS ONCE
Refills: 0 | Status: COMPLETED | OUTPATIENT
Start: 2022-05-12 | End: 2022-05-12

## 2022-05-12 RX ADMIN — Medication 200 MILLIGRAM(S): at 13:07

## 2022-05-12 RX ADMIN — Medication 1000 UNIT(S): at 11:29

## 2022-05-12 RX ADMIN — Medication 10 MILLILITER(S): at 18:06

## 2022-05-12 RX ADMIN — SODIUM CHLORIDE 1000 MILLILITER(S): 9 INJECTION INTRAMUSCULAR; INTRAVENOUS; SUBCUTANEOUS at 04:35

## 2022-05-12 RX ADMIN — TAMSULOSIN HYDROCHLORIDE 0.8 MILLIGRAM(S): 0.4 CAPSULE ORAL at 21:30

## 2022-05-12 RX ADMIN — Medication 1 SPRAY(S): at 04:38

## 2022-05-12 RX ADMIN — CLOPIDOGREL BISULFATE 75 MILLIGRAM(S): 75 TABLET, FILM COATED ORAL at 11:29

## 2022-05-12 RX ADMIN — PANTOPRAZOLE SODIUM 40 MILLIGRAM(S): 20 TABLET, DELAYED RELEASE ORAL at 04:36

## 2022-05-12 RX ADMIN — Medication 10 MILLILITER(S): at 07:06

## 2022-05-12 RX ADMIN — BUPROPION HYDROCHLORIDE 300 MILLIGRAM(S): 150 TABLET, EXTENDED RELEASE ORAL at 11:30

## 2022-05-12 RX ADMIN — CHLORHEXIDINE GLUCONATE 1 APPLICATION(S): 213 SOLUTION TOPICAL at 13:08

## 2022-05-12 RX ADMIN — Medication 10 MILLILITER(S): at 11:30

## 2022-05-12 RX ADMIN — ETHAMBUTOL HYDROCHLORIDE 1200 MILLIGRAM(S): 400 TABLET, FILM COATED ORAL at 11:29

## 2022-05-12 RX ADMIN — Medication 200 MILLIGRAM(S): at 04:42

## 2022-05-12 RX ADMIN — ENOXAPARIN SODIUM 40 MILLIGRAM(S): 100 INJECTION SUBCUTANEOUS at 11:29

## 2022-05-12 RX ADMIN — PANTOPRAZOLE SODIUM 40 MILLIGRAM(S): 20 TABLET, DELAYED RELEASE ORAL at 18:07

## 2022-05-12 RX ADMIN — PYRAZINAMIDE 1500 MILLIGRAM(S): 500 TABLET ORAL at 11:29

## 2022-05-12 RX ADMIN — LORATADINE 10 MILLIGRAM(S): 10 TABLET ORAL at 11:29

## 2022-05-12 RX ADMIN — Medication 50 MILLIGRAM(S): at 13:07

## 2022-05-12 RX ADMIN — SIMVASTATIN 40 MILLIGRAM(S): 20 TABLET, FILM COATED ORAL at 21:30

## 2022-05-12 RX ADMIN — Medication 200 MILLIGRAM(S): at 21:30

## 2022-05-12 RX ADMIN — Medication 2.5 MILLIGRAM(S): at 05:20

## 2022-05-12 RX ADMIN — Medication 300 MILLIGRAM(S): at 13:07

## 2022-05-12 NOTE — CONSULT NOTE ADULT - PROBLEM SELECTOR RECOMMENDATION 3
-  -likely from dehydration  -ivf- NS @ 75 cc/hr for 1 day  -repeat orthostatic vitals today and tomorrow  -check ecg  -check echocardiogram    Rao Candelaria D.O.  [phone]
Fall with Harm Risk

## 2022-05-12 NOTE — PROGRESS NOTE ADULT - ASSESSMENT
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 Risk factors for severe covid include emphysema, HTN, CAD, age and gender, but he is vaccinated with MODERNA x4 - second booster on 3/20/22 and current omicron strain has been associated with much less virulence in vaccinated recipients.   Mtb treatment is priority.   Particularly important to address Mtb with small but significant risk of covid progression and requirement for steroids.  Will hold Remdisivir to assess tolerance of RIPE    stated weight 190#  Tolerating RIPE 5/11/22 -->  mild covid, oxygenating will on room air  syncope episode  ?dehydration    Suggest  Repeat Sputum AFB every 8 hours x 3    Continue:   mg daily  Rifampin 600 mg daily  Ethambutol 1200 daily  Pyrazinamide 1500 mg daily  B6 50 mg daily  monitor oxygenation status, lfts    discussed with ANASTASIYA - no young children at home, considering discharge with home isolation after sputum afb's collected

## 2022-05-12 NOTE — PROGRESS NOTE ADULT - SUBJECTIVE AND OBJECTIVE BOX
Patient is a 66y old  Male who presents with a chief complaint of +sputum culture (12 May 2022 09:01)      SUBJECTIVE / OVERNIGHT EVENTS:    Patient seen and examined. sp fall this AM. pt states his knees buckled and he fell on floor, no LOC, did not hit head. co left elbow pain but improved. denies cp sob abd pain nvd.      Vital Signs Last 24 Hrs  T(C): 36.2 (12 May 2022 07:20), Max: 36.6 (11 May 2022 22:09)  T(F): 97.1 (12 May 2022 07:20), Max: 97.8 (11 May 2022 22:09)  HR: 57 (12 May 2022 07:20) (57 - 71)  BP: 114/62 (12 May 2022 07:20) (110/63 - 140/81)  BP(mean): --  RR: 18 (12 May 2022 07:20) (18 - 18)  SpO2: 96% (12 May 2022 07:20) (96% - 98%)  I&O's Summary    11 May 2022 07:01  -  12 May 2022 07:00  --------------------------------------------------------  IN: 1590 mL / OUT: 500 mL / NET: 1090 mL    12 May 2022 07:01  -  12 May 2022 14:34  --------------------------------------------------------  IN: 240 mL / OUT: 400 mL / NET: -160 mL        PE:  GENERAL: NAD, AAOx3  HEAD:  Atraumatic, Normocephalic  CHEST/LUNG: CTABL, No wheeze  HEART: Regular rate and rhythm; no murmur  ABDOMEN: Soft, Nontender, Nondistended; Bowel sounds present  EXTREMITIES:  2+ Peripheral Pulses, No clubbing, cyanosis, or edema  SKIN: No rashes or lesions  NEURO: No focal deficits    LABS:                        12.2   6.09  )-----------( 283      ( 12 May 2022 07:15 )             37.5     05-12    138  |  105  |  18  ----------------------------<  95  5.0   |  23  |  0.90    Ca    9.0      12 May 2022 07:15  Phos  2.6     05-12  Mg     2.1     05-12    TPro  6.8  /  Alb  4.1  /  TBili  1.3<H>  /  DBili  x   /  AST  16  /  ALT  17  /  AlkPhos  75  05-12    PT/INR - ( 10 May 2022 23:08 )   PT: 13.9 sec;   INR: 1.21 ratio         PTT - ( 10 May 2022 23:08 )  PTT:29.8 sec  CAPILLARY BLOOD GLUCOSE        CARDIAC MARKERS ( 12 May 2022 07:15 )  x     / x     / 99 U/L / x     / 3.2 ng/mL          RADIOLOGY & ADDITIONAL TESTS:    Imaging Personally Reviewed:  [x] YES  [ ] NO    Consultant(s) Notes Reviewed:  [x] YES  [ ] NO    MEDICATIONS  (STANDING):  aspirin enteric coated 81 milliGRAM(s) Oral daily  benzonatate 200 milliGRAM(s) Oral three times a day  buPROPion XL (24-Hour) . 300 milliGRAM(s) Oral daily  chlorhexidine 2% Cloths 1 Application(s) Topical daily  cholecalciferol 1000 Unit(s) Oral daily  clopidogrel Tablet 75 milliGRAM(s) Oral daily  enalapril 2.5 milliGRAM(s) Oral daily  enoxaparin Injectable 40 milliGRAM(s) SubCutaneous every 24 hours  ethambutol 1200 milliGRAM(s) Oral daily  fluticasone propionate 50 MICROgram(s)/spray Nasal Spray 1 Spray(s) Both Nostrils two times a day  guaifenesin/dextromethorphan Oral Liquid 10 milliLiter(s) Oral four times a day  isoniazid 300 milliGRAM(s) Oral daily  loratadine 10 milliGRAM(s) Oral daily  pantoprazole    Tablet 40 milliGRAM(s) Oral two times a day  pyrazinamide 1500 milliGRAM(s) Oral daily  pyridoxine 50 milliGRAM(s) Oral daily  rifAMPin 600 milliGRAM(s) Oral daily  simvastatin 40 milliGRAM(s) Oral at bedtime  sodium chloride 0.9%. 1000 milliLiter(s) (75 mL/Hr) IV Continuous <Continuous>  tamsulosin 0.8 milliGRAM(s) Oral at bedtime    MEDICATIONS  (PRN):  acetaminophen     Tablet .. 650 milliGRAM(s) Oral every 6 hours PRN Temp greater or equal to 38C (100.4F), Mild Pain (1 - 3)  melatonin 3 milliGRAM(s) Oral at bedtime PRN Insomnia  ondansetron Injectable 4 milliGRAM(s) IV Push every 8 hours PRN Nausea and/or Vomiting      Care Discussed with Consultants/Other Providers [x] YES  [ ] NO    HEALTH ISSUES - PROBLEM Dx:  2019 novel coronavirus disease (COVID-19)    Mycobacterium tuberculosis infection    Prophylactic measure    Major depression    CAD (coronary artery disease)    Orthostatic hypotension

## 2022-05-12 NOTE — CONSULT NOTE ADULT - ASSESSMENT
66M w/pmh coronary artery disease, acute inferior wall MI s/p KEVIN to RCA (2007), hypertension, hyperlipidemia, tobacco use, depression, and esophageal reflux, presents to [facility] for positive sputum culture for tb. covid positive.

## 2022-05-12 NOTE — CHART NOTE - NSCHARTNOTEFT_GEN_A_CORE
CC: In-Hospital Fall    HPI: Called by RN to evaluate pt. for UNWITNESSED in-hospital fall.  Pt. seen and examined at bedside, A+Ox3, in NAD.  Pt. endorsed feeling "strange and weak."  Pt. stated that while he was in the bathroom he felt "lightheaded and queazy" and fell.  Pt. denies LOC, however does not remember actually falling.  States that he found himself laying flat on his back on the floor.  Pt. was able to self-transfer back to bed without difficultly.  Admits to "mild left elbow pain and bilat hip "feeling funny.. like I might have bruised it" also suspects that he may have bit his upper lip.  Denies HA, mental status changes, paresthesias, changes in vision, CP, SOB, palpitations, dyspnea, N/V, constipation/diarrhea, abdominal pain, or urinary symptoms,     Vital Signs Last 24 Hrs  T(C): 36.3 (12 May 2022 03:15), Max: 37 (11 May 2022 12:57)  T(F): 97.4 (12 May 2022 03:15), Max: 98.6 (11 May 2022 12:57)  HR: 62 (12 May 2022 03:15) (62 - 72)  BP: 125/66 (12 May 2022 03:15) (118/69 - 140/81)  BP(mean): --  RR: 18 (12 May 2022 03:15) (18 - 18)  SpO2: 98% (12 May 2022 03:15) (95% - 98%)    PHYSICAL EXAM:  Gen: NAD, A&Ox3  Skin: No active bleeding, abrasions, ecchymosis/hematomas, soft tissue swelling, scars, rashes, or any other lesions noted; mild bruising Lt. elbow  Head: Atraumatic, normocephalic  Neuro: CN II-XII intact. PERRL; no focal deficits   Resp: Lungs CTAB, no wheezes, rales, rhonchi; normal respiratory effort  CV: S1S2 present, RRR, no murmurs, rubs, or gallops  GI: BS x4 normoactive, soft, NT/ND   MSK: FROM in BUE/BLE; strength 5/5 in BUE/BLE; no TTP  Ext: 2+ peripheral pulses, no edema, cyanosis    A/P  HPI:  66M w/ PMHx of CAD, acute inferior wall MI s/p KEVIN to RCA (2007), HTN, HLD, tobacco use, depression, and esophageal reflux, presents to [facility] for positive sputum culture for Mycobacterium tuberculosis; also COVID+.    Now p/w unwitnessed in-hospital fall.     #UNWITNESSED In-Hospital Fall likely syncopal episode in setting of orthostasis   > Orthostatic vital signs - positive (supine B/P 121/63, HR 63 --> sitting B/P 110/68, HR 72 --> standing B/P 98/64, HR 89)        -  ml bolus; c/w NS at 100 ml/hr x 12 hrs      - F/u repeat orthostatic vital signs in am      - Cardiac enzymes       - Cardiology consult per attending   > URGENT CT Head to r/o bleed  > URGENT CT Cervical spine   > URGENT x-ray pelvis, bilateral elbows to r/o fx or dislocation   > Fall/safety precautions  > Bed alarm  > Neuro checks q4h  > Pain management PRN  > F/u am labs   > Discussed with RN  > Notified DrRemy   > Will continue to closely monitor  > Will endorse to AM team      Mikala Renee, ARLEEN-BC  [phone] CC: In-Hospital Fall    HPI: Called by RN to evaluate pt. for UNWITNESSED in-hospital fall.  Pt. seen and examined at bedside, A+Ox3, in NAD.  Pt. endorsed feeling "strange and weak."  Pt. stated that while he was in the bathroom he felt "lightheaded and queazy" and fell.  Pt. denies LOC, however does not remember actually falling.  States that he found himself laying flat on his back on the floor.  Pt. was able to self-transfer back to bed without difficultly.  Admits to "mild left elbow pain and bilat hip "feeling funny.. like I might have bruised it" also suspects that he may have bit his upper lip.  Denies HA, mental status changes, paresthesias, changes in vision, CP, SOB, palpitations, dyspnea, N/V, constipation/diarrhea, abdominal pain, or urinary symptoms,     Vital Signs Last 24 Hrs  T(C): 36.3 (12 May 2022 03:15), Max: 37 (11 May 2022 12:57)  T(F): 97.4 (12 May 2022 03:15), Max: 98.6 (11 May 2022 12:57)  HR: 62 (12 May 2022 03:15) (62 - 72)  BP: 125/66 (12 May 2022 03:15) (118/69 - 140/81)  BP(mean): --  RR: 18 (12 May 2022 03:15) (18 - 18)  SpO2: 98% (12 May 2022 03:15) (95% - 98%)    PHYSICAL EXAM:  Gen: NAD, A&Ox3  Skin: No active bleeding, abrasions, ecchymosis/hematomas, soft tissue swelling, scars, rashes, or any other lesions noted; mild bruising Lt. elbow  Head: Atraumatic, normocephalic  Neuro: CN II-XII intact. PERRL; no focal deficits   Resp: Lungs CTAB, no wheezes, rales, rhonchi; normal respiratory effort  CV: S1S2 present, RRR, no murmurs, rubs, or gallops  GI: BS x4 normoactive, soft, NT/ND   MSK: FROM in BUE/BLE; strength 5/5 in BUE/BLE; no TTP  Ext: 2+ peripheral pulses, no edema, cyanosis    A/P  HPI:  66M w/ PMHx of CAD, acute inferior wall MI s/p KEVIN to RCA (2007), HTN, HLD, tobacco use, depression, and esophageal reflux, presents to [facility] for positive sputum culture for Mycobacterium tuberculosis; also COVID+.    Now p/w unwitnessed in-hospital fall.     #UNWITNESSED In-Hospital Fall likely syncopal episode in setting of orthostasis   > Orthostatic vital signs - positive (supine B/P 121/63, HR 63 --> sitting B/P 110/68, HR 72 --> standing B/P 98/64, HR 89)        -  ml bolus; c/w NS at 100 ml/hr x 12 hrs      - F/u repeat orthostatic vital signs in am      - Cardiac enzymes       - Cardiology consult per attending   > URGENT CT Head to r/o bleed  > URGENT CT Cervical spine   > URGENT x-ray pelvis, bilateral elbows to r/o fx or dislocation   > Fall/safety precautions  > Bed alarm  > Neuro checks q4h  > Pain management PRN  > F/u am labs   > Discussed with RN  > Notified Dr. Zurita, attending   > Will continue to closely monitor  > Will endorse to AM team      Mikala Renee, AUDRA-BC  [phone]

## 2022-05-12 NOTE — CONSULT NOTE ADULT - SUBJECTIVE AND OBJECTIVE BOX
[facility] Cardiology Consult  _________________________    Patient is a 66y old  Male who presents with a chief complaint of +sputum culture (11 May 2022 11:54)      HPI:  66M w/pmh coronary artery disease, acute inferior wall MI s/p KEVIN to RCA (2007), hypertension, hyperlipidemia, tobacco use, depression, and esophageal reflux, presents to [facility] for positive sputum culture. He has been getting worked up as an outpatient for a persistent cough; he had a bronchoscopy done a few weeks ago w/biopsies which were negative for malignancy. A sputum culture was obtained which came back positive for Mycobacterium tuberculosis last week. The patient is feeling well overall. No fevers/chills/diarrhea/shortness of breath/chest pain.   found to be covid positive.   this morning after urinating he fell back on the toilet. denies any loss of consciousness.       PAST MEDICAL & SURGICAL HISTORY:  HTN (hypertension)      CAD S/P percutaneous coronary angioplasty  2007      Heart attack  2007      Back pain      H/O urethral stricture  1/2020      S/P rotator cuff surgery  left 2017      CAD S/P percutaneous coronary angioplasty  2 stents Taxus 2007      S/P tonsillectomy      S/P cystoscopy  urethrotomy 1/2020          MEDICATIONS  (STANDING):  aspirin enteric coated 81 milliGRAM(s) Oral daily  benzonatate 200 milliGRAM(s) Oral three times a day  buPROPion XL (24-Hour) . 300 milliGRAM(s) Oral daily  chlorhexidine 2% Cloths 1 Application(s) Topical daily  cholecalciferol 1000 Unit(s) Oral daily  clopidogrel Tablet 75 milliGRAM(s) Oral daily  enalapril 2.5 milliGRAM(s) Oral daily  enoxaparin Injectable 40 milliGRAM(s) SubCutaneous every 24 hours  ethambutol 1200 milliGRAM(s) Oral daily  fluticasone propionate 50 MICROgram(s)/spray Nasal Spray 1 Spray(s) Both Nostrils two times a day  guaifenesin/dextromethorphan Oral Liquid 10 milliLiter(s) Oral four times a day  isoniazid 300 milliGRAM(s) Oral daily  loratadine 10 milliGRAM(s) Oral daily  pantoprazole    Tablet 40 milliGRAM(s) Oral two times a day  pyrazinamide 1500 milliGRAM(s) Oral daily  pyridoxine 50 milliGRAM(s) Oral daily  rifAMPin 600 milliGRAM(s) Oral daily  simvastatin 40 milliGRAM(s) Oral at bedtime  sodium chloride 0.9%. 1000 milliLiter(s) (100 mL/Hr) IV Continuous <Continuous>  tamsulosin 0.8 milliGRAM(s) Oral at bedtime    MEDICATIONS  (PRN):  acetaminophen     Tablet .. 650 milliGRAM(s) Oral every 6 hours PRN Temp greater or equal to 38C (100.4F), Mild Pain (1 - 3)  melatonin 3 milliGRAM(s) Oral at bedtime PRN Insomnia  ondansetron Injectable 4 milliGRAM(s) IV Push every 8 hours PRN Nausea and/or Vomiting      Allergies    No Known Allergies    Intolerances        Social Histroy: Tobacco- , ETOH-, Illicit Drugs-    T(C): 36.2 (05-12-22 @ 07:20), Max: 37 (05-11-22 @ 12:57)  HR: 57 (05-12-22 @ 07:20) (57 - 72)  BP: 114/62 (05-12-22 @ 07:20) (110/63 - 140/81)  RR: 18 (05-12-22 @ 07:20) (18 - 18)  SpO2: 96% (05-12-22 @ 07:20) (95% - 98%)  I&O's Summary    11 May 2022 07:01  -  12 May 2022 07:00  --------------------------------------------------------  IN: 1590 mL / OUT: 500 mL / NET: 1090 mL        Review of Systems:  Constitutional: [ ] Fever [ ] Chills [ ] Fatigue [ ] Weight change   HEENT: [ ] Blurred vision [ ] Eye Pain [ ] Headache [ ] Runny nose [ ] Sore Throat   Respiratory: [ ] Cough [ ] Wheezing [ ] Shortness of breath  Cardiovascular: [ ] Chest Pain [ ] Palpitations [ ] GRACIA [ ] PND [ ] Orthopnea  Gastrointestinal: [ ] Abdominal Pain [ ] Diarrhea [ ] Constipation [ ] Hemorrhoids [ ] Nausea [ ] Vomiting  Genitourinary: [ ] Nocturia [ ] Dysuria [ ] Incontinence  Extremities: [ ] Swelling [ ] Joint Pain  Neurologic: [ ] Focal deficit [ ] Paresthesias [ ] Syncope  Lymphatic: [ ] Swelling [ ] Lymphadenopathy   Skin: [ ] Rash [ ] Ecchymoses [ ] Wounds [ ] Lesions  Psychiatry: [ ] Depression [ ] Suicidal/Homicidal Ideation [ ] Anxiety [ ] Sleep Disturbances  [x ] 10 point review of systems is otherwise negative except as mentioned above            [ ]Unable to obtain    PHYSICAL EXAM:  GENERAL: Alert, NAD  NECK: Supple  CHEST/LUNG: Clear to auscultation bilaterally; No wheezes, rales, or rhonchi  HEART: S1 S2 normal, RRR,  No murmurs, rubs, or gallops  ABDOMEN: Soft, Nondistended  EXTREMITIES:  No LE edema.      LABS:                        12.2   6.09  )-----------( 283      ( 12 May 2022 07:15 )             37.5     05-12    138  |  105  |  18  ----------------------------<  95  5.0   |  23  |  0.90    Ca    9.0      12 May 2022 07:15  Phos  2.6     05-12  Mg     2.1     05-12    TPro  6.8  /  Alb  4.1  /  TBili  1.3<H>  /  DBili  x   /  AST  16  /  ALT  17  /  AlkPhos  75  05-12    PT/INR - ( 10 May 2022 23:08 )   PT: 13.9 sec;   INR: 1.21 ratio         PTT - ( 10 May 2022 23:08 )  PTT:29.8 sec              MEDICATIONS  (STANDING):  aspirin enteric coated 81 milliGRAM(s) Oral daily  benzonatate 200 milliGRAM(s) Oral three times a day  buPROPion XL (24-Hour) . 300 milliGRAM(s) Oral daily  chlorhexidine 2% Cloths 1 Application(s) Topical daily  cholecalciferol 1000 Unit(s) Oral daily  clopidogrel Tablet 75 milliGRAM(s) Oral daily  enalapril 2.5 milliGRAM(s) Oral daily  enoxaparin Injectable 40 milliGRAM(s) SubCutaneous every 24 hours  ethambutol 1200 milliGRAM(s) Oral daily  fluticasone propionate 50 MICROgram(s)/spray Nasal Spray 1 Spray(s) Both Nostrils two times a day  guaifenesin/dextromethorphan Oral Liquid 10 milliLiter(s) Oral four times a day  isoniazid 300 milliGRAM(s) Oral daily  loratadine 10 milliGRAM(s) Oral daily  pantoprazole    Tablet 40 milliGRAM(s) Oral two times a day  pyrazinamide 1500 milliGRAM(s) Oral daily  pyridoxine 50 milliGRAM(s) Oral daily  rifAMPin 600 milliGRAM(s) Oral daily  simvastatin 40 milliGRAM(s) Oral at bedtime  sodium chloride 0.9%. 1000 milliLiter(s) (100 mL/Hr) IV Continuous <Continuous>  tamsulosin 0.8 milliGRAM(s) Oral at bedtime    MEDICATIONS  (PRN):  acetaminophen     Tablet .. 650 milliGRAM(s) Oral every 6 hours PRN Temp greater or equal to 38C (100.4F), Mild Pain (1 - 3)  melatonin 3 milliGRAM(s) Oral at bedtime PRN Insomnia  ondansetron Injectable 4 milliGRAM(s) IV Push every 8 hours PRN Nausea and/or Vomiting          RADIOLOGY & ADDITIONAL TESTS:    Cardiology testing:

## 2022-05-12 NOTE — PROGRESS NOTE ADULT - SUBJECTIVE AND OBJECTIVE BOX
[facility] - PROGRESS NOTE    CHIEF COMPLAINT: abnormal chest CT; tuberculosis; COVID-19 infection; interstitial lung disease; COPD/emphysema; pulmonary granuloma; smoker    INTERVAL HISTORY: demanding for transfer to a hospital that will give him monoclonal antibodies for his asymptomatic COVID infection - he called Jamil Rao who threatened myself and the hospital if the "appropriate" treatment is not given; patient fell on his back and left elbow overnight after becoming lightheaded and "queazy" walking to the bathroom -> found to be orthostatic and being given IV fluids; started on RIPE therapy - felt not to require steroids, remdesivir or monoclonal antibodies by ID; no shortness of breath or hypoxemia on room air; no worsening of his chronic cough, sputum production, chest congestion or wheeze; no fevers, chills or sweats; no chest pain/pressure or palpitations;     REVIEW OF SYSTEMS:  Constitutional: As per interval history  HEENT: Within normal limits  CV: As per interval history  Resp: As per interval history  GI: Within normal limits   : Within normal limits  Musculoskeletal: Within normal limits  Skin: Within normal limits  Neurological: Within normal limits  Psychiatric: Within normal limits  Endocrine: Within normal limits  Hematologic/Lymphatic: Within normal limits  Allergic/Immunologic: Within normal limits    MEDICATIONS:     Pulmonary "  benzonatate 200 milliGRAM(s) Oral three times a day  guaifenesin/dextromethorphan Oral Liquid 10 milliLiter(s) Oral four times a day  loratadine 10 milliGRAM(s) Oral daily    Anti-microbials:  ethambutol 1200 milliGRAM(s) Oral daily  isoniazid 300 milliGRAM(s) Oral daily  pyrazinamide 1500 milliGRAM(s) Oral daily  rifAMPin 600 milliGRAM(s) Oral daily    Cardiovascular:  enalapril 2.5 milliGRAM(s) Oral daily  tamsulosin 0.8 milliGRAM(s) Oral at bedtime    Other:  aspirin enteric coated 81 milliGRAM(s) Oral daily  buPROPion XL (24-Hour) . 300 milliGRAM(s) Oral daily  chlorhexidine 2% Cloths 1 Application(s) Topical daily  cholecalciferol 1000 Unit(s) Oral daily  clopidogrel Tablet 75 milliGRAM(s) Oral daily  enoxaparin Injectable 40 milliGRAM(s) SubCutaneous every 24 hours  fluticasone propionate 50 MICROgram(s)/spray Nasal Spray 1 Spray(s) Both Nostrils two times a day  pantoprazole    Tablet 40 milliGRAM(s) Oral two times a day  pyridoxine 50 milliGRAM(s) Oral daily  simvastatin 40 milliGRAM(s) Oral at bedtime  sodium chloride 0.9%. 1000 milliLiter(s) IV Continuous <Continuous>    MEDICATIONS  (PRN):  acetaminophen     Tablet .. 650 milliGRAM(s) Oral every 6 hours PRN Temp greater or equal to 38C (100.4F), Mild Pain (1 - 3)  melatonin 3 milliGRAM(s) Oral at bedtime PRN Insomnia  ondansetron Injectable 4 milliGRAM(s) IV Push every 8 hours PRN Nausea and/or Vomiting    OBJECTIVE:    Daily Weight in k (11 May 2022 12:57)    PHYSICAL EXAM:       ICU Vital Signs Last 24 Hrs  T(C): 36.3 (12 May 2022 03:15), Max: 37 (11 May 2022 12:57)  T(F): 97.4 (12 May 2022 03:15), Max: 98.6 (11 May 2022 12:57)  HR: 62 (12 May 2022 03:15) (62 - 72)  BP: 125/66 (12 May 2022 03:15) (118/69 - 140/81)  BP(mean): --  ABP: --  ABP(mean): --  RR: 18 (12 May 2022 03:15) (18 - 18)  SpO2: 98% (12 May 2022 03:15) (95% - 98%) on room air     General: Awake. Alert. Cooperative. No distress. Appears stated age.  HEENT: Atraumatic. Normocephalic. Anicteric. Normal oral mucosa. PERRL. EOMI. Left parotid mass.  Neck: Supple. Trachea midline. Thyroid without enlargement/tenderness/nodules. No carotid bruit. No JVD.	  Cardiovascular: Regular rate and rhythm. S1 S2 normal. No murmurs, rubs or gallops.  Respiratory: Respirations unlabored. Right sided rales. No curvature.  Abdomen: Soft. Non-tender. Non-distended. No organomegaly. No masses. Normal bowel sounds.  Extremities: Warm to touch. No clubbing or cyanosis. No pedal edema.  Pulses: 2+ peripheral pulses all extremities.	  Skin: Normal skin color. No rashes or lesions. No ecchymoses. No cyanosis. Warm to touch.  Lymph Nodes: Cervical, supraclavicular and axillary nodes normal  Neurological: Motor and sensory examination equal and normal. A and O x 3  Psychiatry: Appropriate mood and affect.    LABS:                          12.1   8.06  )-----------( 288      ( 10 May 2022 23:08 )             37.8     CBC    WBC  8.06 <==    Hemoglobin  12.1 <<==    Hematocrit  37.8 <==    Platelets  288 <==      139  |  104  |  16  ----------------------------<  93    05-10  4.0   |  22  |  0.81      LYTES    sodium  139 <==    potassium   4.0 <==    chloride  104 <==    carbon dioxide  22 <==    =============================================================================================  RENAL FUNCTION:    Creatinine:   0.81  <<==    BUN:   16 <==    ============================================================================================    calcium   9.6 <==    ===========================================================================================  LFTs    AST:   20 <==     ALT:  21  <==     AP:  80  <=    Bili:  0.5  <=    PT/INR - ( 10 May 2022 23:08 )   PT: 13.9 sec;   INR: 1.21 ratio       PTT - ( 10 May 2022 23:08 )  PTT:29.8 sec    MICROBIOLOGY:     Respiratory Viral Panel with COVID-19 by CASSIUS (05.10.22 @ 23:07)   Rapid RVP Result: Detected   SARS-CoV-2: Detected  This Respiratory Panel uses polymerase chain reaction (PCR) to detect for   adenovirus; coronavirus (HKU1, NL63, 229E, OC43); human metapneumovirus   (hMPV); human enterovirus/rhinovirus (Entero/RV); influenza A; influenza   A/H1; influenza A/H3; influenza A/H1-2009; influenza B; parainfluenza   viruses 1, 2, 3, 4; respiratory syncytial virus; Mycoplasma pneumoniae;   Chlamydophila pneumoniae; and SARS-CoV-2.     RADIOLOGY:  [x] Chest radiographs reviewed and interpreted by me    EXAM:  XR CHEST PORTABLE URGENT 1V                          PROCEDURE DATE:  05/10/2022      FINDINGS:    There is a semicircular pleural-based density in the right apex.  There are no focal infiltrates.  No pleural effusion or pneumothorax.  The heart is not enlarged.  No acute osseous abnormality.    IMPRESSION:  Right apical pleural-based density which was not present on the prior   study. Correlation with chest CT is recommended.      DAVE ARRINGTON MD; Resident Radiology  This document has been electronically signed.  OTILIO AUGUSTINE MD; Attending Radiologist  This document has been electronically signed. May 11 2022  1:15PM  ---------------------------------------------------------------------------------------------------------------  < from: CT Head No Cont (22 @ 06:03) >    ******PRELIMINARY REPORT******      ******PRELIMINARY REPORT******       ACC: [id number] EXAM:  CT BRAIN                          PROCEDURE DATE:  2022    ******PRELIMINARY REPORT******      ******PRELIMINARY REPORT******           INTERPRETATION:  No acute intracranial hemorrhage or hydrocephalus.  Mixed attention fluid within the bilateral maxillary sinus may represent   blood products versus sinusitis.  Compression deformity at C5 and C6.    Findings discussed with Víctor COREY at 6:10 Bebe 2022        ******PRELIMINARY REPORT******      ******PRELIMINARY REPORT******       BRIAN CHOW MD; Resident Radiologist  ---------------------------------------------------------------------------------------------------------------  < from: Xray Elbow AP + Lateral, Bilateral (22 @ 05:58) >    ******PRELIMINARY REPORT******      ******PRELIMINARY REPORT******       ACC: [id number] EXAM:  XR ELBOW 2 VIEWS BI                          PROCEDURE DATE:  2022    ******PRELIMINARY REPORT******      ******PRELIMINARY REPORT******       INTERPRETATION:  CLINICAL INFORMATION: In hospital fall.    TECHNIQUE: 3 views of the bilateral elbows    COMPARISON: No similar prior comparisons available.    IMPRESSION:  No acute fracture or dislocation.  Joint spaces are maintained.  No elbow joint effusion.    Findings discussed with Víctor COREY at 6:00 AM on 2022    ******PRELIMINARY REPORT******      ******PRELIMINARY REPORT******       BRIAN CHOW MD; Resident Radiologist    ---------------------------------------------------------------------------------------------------------------  < from: Xray Pelvis AP only (22 @ 05:56) >    ******PRELIMINARY REPORT******      ******PRELIMINARY REPORT******       ACC: [id number] EXAM:  XR PELVIS AP ONLY 1-2 VIEWS                          PROCEDURE DATE:  2022    ******PRELIMINARY REPORT******      ******PRELIMINARY REPORT******           INTERPRETATION:  CLINICAL INDICATION: In hospital fall.    TECHNIQUE: Frontal view of the pelvis    COMPARISON: No similar prior comparisons available.    IMPRESSION:  No acute displaced fracture or dislocation.  The sacroiliac joints and pubicsymphysis remain intact.  Intact pelvic and obturator rings.    Findings discussed with Mary COREY at 6:00 AM on 2022        ******PRELIMINARY REPORT******      ******PRELIMINARY REPORT******       BRIAN CHOW MD; Resident Radiologist    ---------------------------------------------------------------------------------------------------------------

## 2022-05-12 NOTE — PROGRESS NOTE ADULT - ASSESSMENT
ASSESSMENT:    66 year old gentleman, current smoker, with no known history of intrinsic lung disease. He has a history of HTN, CAD s/p PCI and MI with preserved LVEF, urinary retention s/p cystoscopy in 2019 for intervention on a urethral stricture and chronic appendicitis s/p laparoscopic resection in 2020. He has a chronic left parotid mass. He was seen in March by Dr. Tylor Polk in follow-up. Biopsy of this mass and a nearby node was negative for malignancy many years ago. 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 The patient is followed in the outpatient setting for pulmonary nodules. He developed a pulmonary infiltrate with mediastinal adenopathy earlier this year. Bronchoscopy with subcarinal and left hilar node biopsies was negative for neoplasm and granulomatous inflammation. The right hilar node was not biopsied due to proximity to the pulmonary artery. Sampling of the right upper lobe lesion was also without neoplasm. AFB cultures have been negative x 8 weeks. Follow-up chest CT on March 29th -> patent central airways - stable peribronchial thickening with fine peripheral groundglass reticular opacities c/w mild interstitial lung disease - stable mild emphysema - stable right upper lobe granuloma - progression of the patchy branching right apical consolidation with a tree in bud configuration extending to the medial, lateral and apical pleural surfaces. A sputum specimen was recently collected due to ongoing cough with scant sputum production -> mycobacterium tuberculosis. The patient was also recently exposed to his wife with COVID and has tested positive of this infection. He was advised to come to the ER for evaluation and treatment. He has no shortness of breath or hypoxemia on room air. His chronic cough is no worse than usual and is associated with a post nasal drip. He has no chest congestion or wheeze. He has no fevers, chills or sweats. No chest pain/pressure or palpitations. His appetite is good and he has not lost weight.    right upper lobe active tuberculosis    COVID-19 infection without dyspnea or hypoxemia    chronic cough due to post nasal drip syndrome now exacerbated by lung infections    mild interstitial lung disease and emphysema    5/12 - demanding for transfer to a hospital that will give him monoclonal antibodies for his asymptomatic COVID infection - he called Jamil Rao who threatened myself and the hospital if the "appropriate" treatment is not given; patient fell on his back and left elbow overnight after becoming lightheaded and "queazy" walking to the bathroom -> found to be orthostatic and being given IV fluids    PLAN/RECOMMENDATIONS:    stable oxygenation on room air  airborne and contact isolation  chest CT "to my eye" ->  patent central airways - stable peribronchial thickening with fine peripheral groundglass and reticular opacities c/w mild interstitial lung disease - stable mild emphysema - stable right upper lobe granuloma - further progression of the patchy branching right apical consolidation with a tree in bud configuration extending to the medial, lateral and apical pleural surfaces   no indication for nebs or antibacterial antibiotics  robitussin DM/tessalon  claritin/flonase  ID evaluation noted     not a candidate for monoclonal antibodies - risk factors for progression of COVID disease include emphysema, hypertension, coronary artery diseaes and age - however he is vaccinated with MODERNA x 4 having received a second booster on 3/20/22 and the current omicron strains is much less virulent than prior strains     does not meet criteria for remdesivir or steroids     AFB smear is positive -> will need isolation until 2 weeks of anti mycobacterial therapy     started on INH/rifampin/ethambutol/pyrazinamide/B6 following LFTs and respiratory status  cardiac meds: ASA/plavix/enalapril/zocor  diligent DVT prophylaxis - SQ lovenox 40mg SQ daily    Will follow with you. Plan of care discussed with the patient at bedside and with Jamil Rao and Dr. Gracia.    Ramakrishna Russell MD, [facility]  [phone]  Pulmonary Medicine

## 2022-05-12 NOTE — PROVIDER CONTACT NOTE (FALL NOTIFICATION) - SITUATION
Pt ambulated to the bathroom where he felt lightheaded and queazy. Pt stated that he fainted and fell in the bathroom. He then woke up and went back to bed where he called for assistance.

## 2022-05-12 NOTE — PROVIDER CONTACT NOTE (FALL NOTIFICATION) - ACTION/TREATMENT ORDERED:
Educate patient on importance of using call bell system before ambulating. Bed alarm turned on. Continue to monitor patient for any changes in LOC.

## 2022-05-12 NOTE — PROGRESS NOTE ADULT - SUBJECTIVE AND OBJECTIVE BOX
Follow Up:  TB, covid    Interval History/ROS:  had syncopal episode early am, feels ok at present,  occasional cough which he attributes to sinus and states it is at baseline    Allergies  No Known Allergies    ANTIMICROBIALS:  ethambutol 1200 daily  isoniazid 300 daily  pyrazinamide 1500 daily  rifAMPin 600 daily      OTHER MEDS:  MEDICATIONS  (STANDING):  acetaminophen     Tablet .. 650 every 6 hours PRN  aspirin enteric coated 81 daily  benzonatate 200 three times a day  buPROPion XL (24-Hour) . 300 daily  clopidogrel Tablet 75 daily  enalapril 2.5 daily  enoxaparin Injectable 40 every 24 hours  guaifenesin/dextromethorphan Oral Liquid 10 four times a day  loratadine 10 daily  melatonin 3 at bedtime PRN  ondansetron Injectable 4 every 8 hours PRN  pantoprazole    Tablet 40 two times a day  simvastatin 40 at bedtime  tamsulosin 0.8 at bedtime      Vital Signs Last 24 Hrs  T(C): 36.2 (12 May 2022 07:20), Max: 36.6 (11 May 2022 22:09)  T(F): 97.1 (12 May 2022 07:20), Max: 97.8 (11 May 2022 22:09)  HR: 57 (12 May 2022 07:20) (57 - 71)  BP: 114/62 (12 May 2022 07:20) (110/63 - 140/81)  BP(mean): --  RR: 18 (12 May 2022 07:20) (18 - 18)  SpO2: 96% (12 May 2022 07:20) (96% - 98%)    PHYSICAL EXAM:  General: WN/WD NAD, Non-toxic  Neurology: A&Ox3, nonfocal  Respiratory: Clear to auscultation bilaterally - crackles right chest  CV: RRR, S1S2, no murmurs, rubs or gallops  Abdominal: Soft, Non-tender, non-distended,  Extremities: No edema,  Line Sites: Clear  Skin: No rash                          12.2   6.09  )-----------( 283      ( 12 May 2022 07:15 )             37.5       05-12    138  |  105  |  18  ----------------------------<  95  5.0   |  23  |  0.90    Ca    9.0      12 May 2022 07:15  Phos  2.6     05-12  Mg     2.1     05-12    TPro  6.8  /  Alb  4.1  /  TBili  1.3<H>  /  DBili  x   /  AST  16  /  ALT  17  /  AlkPhos  75  05-12    MICROBIOLOGY:    Rapid RVP Result: Detected (05-10 @ 23:07)        RADIOLOGY:  images independently viewed  < from: CT Chest No Cont (05.11.22 @ 22:17) >  FINDINGS:    LYMPH NODES: No enlarged hiatal hernia. There are no enlarged chest lymph   nodes. The thyroid gland is normal.    HEART/VASCULATURE: The heart is normal in size. No pericardial effusion.   There are aortic and coronary artery calcifications. Mitral annular   calcifications    AIRWAYS/LUNGS/PLEURA:  Right upper lobe tree-in-bud opacities are new   since 12/14/2017.    Interval increase in lower lobe and lingular reticular opacities.    Biapical pleural opacity with calcifications and bilateral calcified   nodules are unchanged. In addition, there are pulmonary nodules which are   unchanged. The largest nodule measures 6 mm in the right lower lobe   (series 3 image 111).    The bilateral peripheral areas of cystic change are unchanged since 2017   and are best shown in the bilateral upper lobes.    No pleural effusion. Patent central airways.    UPPER ABDOMEN: Within normal limits.    BONES/SOFT TISSUES: Degenerative changes of the spine.    IMPRESSION:  1.  There are new right upper lobe tree-in-bud opacities, findings are   concerning for infection, including atypical infection such as   mycobacterium given the history.  2.  Interval increase in lower lobe and lingular reticular opacities, and   the bilateral lower lobe groundglass opacities are of uncertain etiology.    < end of copied text >      Everardo Gracia MD; Division of Infectious Disease; Pager: [phone]; nights and weekends: [phone]

## 2022-05-12 NOTE — PROVIDER CONTACT NOTE (FALL NOTIFICATION) - ASSESSMENT
Pt was found in bed stating that he was feeling better. No visible injuries to the head or body. VSS. A&Ox4.

## 2022-05-12 NOTE — PROGRESS NOTE ADULT - ASSESSMENT
66M w/pmh coronary artery disease, acute inferior wall MI s/p KEVIN to RCA (2007), hypertension, hyperlipidemia, tobacco use, depression, and esophageal reflux, presents to [facility] for positive sputum culture for Mycobacterium tuberculosis.    # COVID 19  does not qualify for MAB  ID following  stable on RA, asymptomatic  no indication for remdesivir and decadron    # mechanical fall  xray pelvis and elbow no rx  CT head no bleed  orthostatics positive on IVF, repeat improved  check EKG and TTE    # Mycobacterium tuberculosis infection  cont rx for TB  ID and pulm following  will need to be on quarantine for 2 weeks while getting RX    # Major depression  cont wellbutrin at home dose    # CAD   cont simvastatin, aspirin, plavix, enalapril    # BPH  cont tamsulosin    dvt ppx lovenox due to covid infection    Please call [facility] with questions [phone].

## 2022-05-13 ENCOUNTER — TRANSCRIPTION ENCOUNTER (OUTPATIENT)
Age: 67
End: 2022-05-13

## 2022-05-13 VITALS
OXYGEN SATURATION: 97 % | HEART RATE: 53 BPM | SYSTOLIC BLOOD PRESSURE: 116 MMHG | TEMPERATURE: 97 F | RESPIRATION RATE: 16 BRPM | DIASTOLIC BLOOD PRESSURE: 63 MMHG

## 2022-05-13 LAB
ANION GAP SERPL CALC-SCNC: 12 MMOL/L — SIGNIFICANT CHANGE UP (ref 5–17)
BASOPHILS # BLD AUTO: 0.07 K/UL — SIGNIFICANT CHANGE UP (ref 0–0.2)
BASOPHILS NFR BLD AUTO: 1.3 % — SIGNIFICANT CHANGE UP (ref 0–2)
BUN SERPL-MCNC: 16 MG/DL — SIGNIFICANT CHANGE UP (ref 7–23)
CALCIUM SERPL-MCNC: 8.9 MG/DL — SIGNIFICANT CHANGE UP (ref 8.4–10.5)
CHLORIDE SERPL-SCNC: 104 MMOL/L — SIGNIFICANT CHANGE UP (ref 96–108)
CO2 SERPL-SCNC: 19 MMOL/L — LOW (ref 22–31)
CREAT SERPL-MCNC: 0.76 MG/DL — SIGNIFICANT CHANGE UP (ref 0.5–1.3)
D DIMER BLD IA.RAPID-MCNC: <150 NG/ML DDU — SIGNIFICANT CHANGE UP
EGFR: 100 ML/MIN/1.73M2 — SIGNIFICANT CHANGE UP
EOSINOPHIL # BLD AUTO: 0.29 K/UL — SIGNIFICANT CHANGE UP (ref 0–0.5)
EOSINOPHIL NFR BLD AUTO: 5.2 % — SIGNIFICANT CHANGE UP (ref 0–6)
GLUCOSE SERPL-MCNC: 93 MG/DL — SIGNIFICANT CHANGE UP (ref 70–99)
HCT VFR BLD CALC: 35.9 % — LOW (ref 39–50)
HGB BLD-MCNC: 11.8 G/DL — LOW (ref 13–17)
IMM GRANULOCYTES NFR BLD AUTO: 1.1 % — SIGNIFICANT CHANGE UP (ref 0–1.5)
LYMPHOCYTES # BLD AUTO: 1.29 K/UL — SIGNIFICANT CHANGE UP (ref 1–3.3)
LYMPHOCYTES # BLD AUTO: 23.1 % — SIGNIFICANT CHANGE UP (ref 13–44)
MCHC RBC-ENTMCNC: 30.5 PG — SIGNIFICANT CHANGE UP (ref 27–34)
MCHC RBC-ENTMCNC: 32.9 GM/DL — SIGNIFICANT CHANGE UP (ref 32–36)
MCV RBC AUTO: 92.8 FL — SIGNIFICANT CHANGE UP (ref 80–100)
MONOCYTES # BLD AUTO: 0.55 K/UL — SIGNIFICANT CHANGE UP (ref 0–0.9)
MONOCYTES NFR BLD AUTO: 9.9 % — SIGNIFICANT CHANGE UP (ref 2–14)
NEUTROPHILS # BLD AUTO: 3.32 K/UL — SIGNIFICANT CHANGE UP (ref 1.8–7.4)
NEUTROPHILS NFR BLD AUTO: 59.4 % — SIGNIFICANT CHANGE UP (ref 43–77)
NIGHT BLUE STAIN TISS: SIGNIFICANT CHANGE UP
NRBC # BLD: 0 /100 WBCS — SIGNIFICANT CHANGE UP (ref 0–0)
PLATELET # BLD AUTO: 272 K/UL — SIGNIFICANT CHANGE UP (ref 150–400)
POTASSIUM SERPL-MCNC: 4.2 MMOL/L — SIGNIFICANT CHANGE UP (ref 3.5–5.3)
POTASSIUM SERPL-SCNC: 4.2 MMOL/L — SIGNIFICANT CHANGE UP (ref 3.5–5.3)
RBC # BLD: 3.87 M/UL — LOW (ref 4.2–5.8)
RBC # FLD: 14.9 % — HIGH (ref 10.3–14.5)
SODIUM SERPL-SCNC: 135 MMOL/L — SIGNIFICANT CHANGE UP (ref 135–145)
SPECIMEN SOURCE: SIGNIFICANT CHANGE UP
WBC # BLD: 5.58 K/UL — SIGNIFICANT CHANGE UP (ref 3.8–10.5)
WBC # FLD AUTO: 5.58 K/UL — SIGNIFICANT CHANGE UP (ref 3.8–10.5)

## 2022-05-13 PROCEDURE — 85730 THROMBOPLASTIN TIME PARTIAL: CPT

## 2022-05-13 PROCEDURE — 83615 LACTATE (LD) (LDH) ENZYME: CPT

## 2022-05-13 PROCEDURE — 71250 CT THORAX DX C-: CPT

## 2022-05-13 PROCEDURE — 36415 COLL VENOUS BLD VENIPUNCTURE: CPT

## 2022-05-13 PROCEDURE — 0225U NFCT DS DNA&RNA 21 SARSCOV2: CPT

## 2022-05-13 PROCEDURE — 70450 CT HEAD/BRAIN W/O DYE: CPT

## 2022-05-13 PROCEDURE — 86481 TB AG RESPONSE T-CELL SUSP: CPT

## 2022-05-13 PROCEDURE — 93306 TTE W/DOPPLER COMPLETE: CPT | Mod: 26

## 2022-05-13 PROCEDURE — 80048 BASIC METABOLIC PNL TOTAL CA: CPT

## 2022-05-13 PROCEDURE — 72170 X-RAY EXAM OF PELVIS: CPT

## 2022-05-13 PROCEDURE — 85025 COMPLETE CBC W/AUTO DIFF WBC: CPT

## 2022-05-13 PROCEDURE — 85610 PROTHROMBIN TIME: CPT

## 2022-05-13 PROCEDURE — 94640 AIRWAY INHALATION TREATMENT: CPT

## 2022-05-13 PROCEDURE — 82728 ASSAY OF FERRITIN: CPT

## 2022-05-13 PROCEDURE — 84100 ASSAY OF PHOSPHORUS: CPT

## 2022-05-13 PROCEDURE — 93005 ELECTROCARDIOGRAM TRACING: CPT

## 2022-05-13 PROCEDURE — 86140 C-REACTIVE PROTEIN: CPT

## 2022-05-13 PROCEDURE — 85379 FIBRIN DEGRADATION QUANT: CPT

## 2022-05-13 PROCEDURE — 87015 SPECIMEN INFECT AGNT CONCNTJ: CPT

## 2022-05-13 PROCEDURE — 73070 X-RAY EXAM OF ELBOW: CPT

## 2022-05-13 PROCEDURE — 80053 COMPREHEN METABOLIC PANEL: CPT

## 2022-05-13 PROCEDURE — 87641 MR-STAPH DNA AMP PROBE: CPT

## 2022-05-13 PROCEDURE — 87206 SMEAR FLUORESCENT/ACID STAI: CPT

## 2022-05-13 PROCEDURE — 93306 TTE W/DOPPLER COMPLETE: CPT

## 2022-05-13 PROCEDURE — 82550 ASSAY OF CK (CPK): CPT

## 2022-05-13 PROCEDURE — 71045 X-RAY EXAM CHEST 1 VIEW: CPT

## 2022-05-13 PROCEDURE — 87640 STAPH A DNA AMP PROBE: CPT

## 2022-05-13 PROCEDURE — 72125 CT NECK SPINE W/O DYE: CPT

## 2022-05-13 PROCEDURE — 83735 ASSAY OF MAGNESIUM: CPT

## 2022-05-13 PROCEDURE — 87116 MYCOBACTERIA CULTURE: CPT

## 2022-05-13 PROCEDURE — 99285 EMERGENCY DEPT VISIT HI MDM: CPT

## 2022-05-13 PROCEDURE — 82553 CREATINE MB FRACTION: CPT

## 2022-05-13 PROCEDURE — 99232 SBSQ HOSP IP/OBS MODERATE 35: CPT

## 2022-05-13 PROCEDURE — 84484 ASSAY OF TROPONIN QUANT: CPT

## 2022-05-13 RX ORDER — PYRIDOXINE HCL (VITAMIN B6) 100 MG
1 TABLET ORAL
Qty: 30 | Refills: 0
Start: 2022-05-13 | End: 2022-06-11

## 2022-05-13 RX ORDER — HEXAVITAMINS
1 TABLET ORAL
Qty: 30 | Refills: 0
Start: 2022-05-13 | End: 2022-06-11

## 2022-05-13 RX ORDER — PYRAZINAMIDE 500 MG/1
3 TABLET ORAL
Qty: 90 | Refills: 0
Start: 2022-05-13 | End: 2022-06-11

## 2022-05-13 RX ORDER — MUPIROCIN 20 MG/G
1 OINTMENT TOPICAL
Refills: 0 | Status: DISCONTINUED | OUTPATIENT
Start: 2022-05-13 | End: 2022-05-13

## 2022-05-13 RX ORDER — ETHAMBUTOL HYDROCHLORIDE 400 MG/1
3 TABLET, FILM COATED ORAL
Qty: 90 | Refills: 0
Start: 2022-05-13 | End: 2022-06-11

## 2022-05-13 RX ADMIN — Medication 1 SPRAY(S): at 06:24

## 2022-05-13 RX ADMIN — PYRAZINAMIDE 1500 MILLIGRAM(S): 500 TABLET ORAL at 12:48

## 2022-05-13 RX ADMIN — Medication 50 MILLIGRAM(S): at 12:48

## 2022-05-13 RX ADMIN — Medication 10 MILLILITER(S): at 17:30

## 2022-05-13 RX ADMIN — BUPROPION HYDROCHLORIDE 300 MILLIGRAM(S): 150 TABLET, EXTENDED RELEASE ORAL at 12:49

## 2022-05-13 RX ADMIN — PANTOPRAZOLE SODIUM 40 MILLIGRAM(S): 20 TABLET, DELAYED RELEASE ORAL at 06:20

## 2022-05-13 RX ADMIN — Medication 10 MILLILITER(S): at 12:49

## 2022-05-13 RX ADMIN — PANTOPRAZOLE SODIUM 40 MILLIGRAM(S): 20 TABLET, DELAYED RELEASE ORAL at 17:30

## 2022-05-13 RX ADMIN — ENOXAPARIN SODIUM 40 MILLIGRAM(S): 100 INJECTION SUBCUTANEOUS at 12:48

## 2022-05-13 RX ADMIN — Medication 2.5 MILLIGRAM(S): at 06:20

## 2022-05-13 RX ADMIN — Medication 200 MILLIGRAM(S): at 12:51

## 2022-05-13 RX ADMIN — LORATADINE 10 MILLIGRAM(S): 10 TABLET ORAL at 12:49

## 2022-05-13 RX ADMIN — Medication 1000 UNIT(S): at 12:50

## 2022-05-13 RX ADMIN — ETHAMBUTOL HYDROCHLORIDE 1200 MILLIGRAM(S): 400 TABLET, FILM COATED ORAL at 12:49

## 2022-05-13 RX ADMIN — CHLORHEXIDINE GLUCONATE 1 APPLICATION(S): 213 SOLUTION TOPICAL at 12:49

## 2022-05-13 RX ADMIN — Medication 10 MILLILITER(S): at 06:38

## 2022-05-13 RX ADMIN — Medication 650 MILLIGRAM(S): at 06:25

## 2022-05-13 RX ADMIN — Medication 200 MILLIGRAM(S): at 06:20

## 2022-05-13 RX ADMIN — Medication 81 MILLIGRAM(S): at 12:49

## 2022-05-13 RX ADMIN — Medication 300 MILLIGRAM(S): at 17:30

## 2022-05-13 RX ADMIN — Medication 1 SPRAY(S): at 17:30

## 2022-05-13 RX ADMIN — CLOPIDOGREL BISULFATE 75 MILLIGRAM(S): 75 TABLET, FILM COATED ORAL at 12:49

## 2022-05-13 NOTE — DISCHARGE NOTE PROVIDER - CARE PROVIDER_API CALL
Everardo Gracia)  Internal Medicine  [address] [place] [address]  Phone: [phone]  Fax: [phone]  Follow Up Time: 2 weeks

## 2022-05-13 NOTE — PROGRESS NOTE ADULT - SUBJECTIVE AND OBJECTIVE BOX
[facility] - PROGRESS NOTE    CHIEF COMPLAINT: abnormal chest CT; tuberculosis; COVID-19 infection; interstitial lung disease; COPD/emphysema; pulmonary granuloma; smoker    INTERVAL HISTORY: much calmer -> has provided 3 sputum samples for AFB testing - hoping for discharge with home isolation; tolerating RIPE therapy - felt not to require steroids, remdesivir or monoclonal antibodies by ID; no shortness of breath or hypoxemia on room air; no worsening of his chronic cough, sputum production, chest congestion or wheeze; no fevers, chills or sweats; no chest pain/pressure or palpitations; good appetite; no further bouts of lightheadedness or dizziness (has received almost 3 liters of IV fluids)    REVIEW OF SYSTEMS:  Constitutional: As per interval history  HEENT: Within normal limits  CV: As per interval history  Resp: As per interval history  GI: Within normal limits   : Within normal limits  Musculoskeletal: Within normal limits  Skin: Within normal limits  Neurological: Within normal limits  Psychiatric: Within normal limits  Endocrine: Within normal limits  Hematologic/Lymphatic: Within normal limits  Allergic/Immunologic: Within normal limits    MEDICATIONS:     Pulmonary "  benzonatate 200 milliGRAM(s) Oral three times a day  guaifenesin/dextromethorphan Oral Liquid 10 milliLiter(s) Oral four times a day  loratadine 10 milliGRAM(s) Oral daily    Anti-microbials:  ethambutol 1200 milliGRAM(s) Oral daily  isoniazid 300 milliGRAM(s) Oral daily  pyrazinamide 1500 milliGRAM(s) Oral daily  rifAMPin 600 milliGRAM(s) Oral daily    Cardiovascular:  enalapril 2.5 milliGRAM(s) Oral daily  tamsulosin 0.8 milliGRAM(s) Oral at bedtime    Other:  aspirin enteric coated 81 milliGRAM(s) Oral daily  buPROPion XL (24-Hour) . 300 milliGRAM(s) Oral daily  chlorhexidine 2% Cloths 1 Application(s) Topical daily  cholecalciferol 1000 Unit(s) Oral daily  clopidogrel Tablet 75 milliGRAM(s) Oral daily  enoxaparin Injectable 40 milliGRAM(s) SubCutaneous every 24 hours  fluticasone propionate 50 MICROgram(s)/spray Nasal Spray 1 Spray(s) Both Nostrils two times a day  pantoprazole    Tablet 40 milliGRAM(s) Oral two times a day  pyridoxine 50 milliGRAM(s) Oral daily  simvastatin 40 milliGRAM(s) Oral at bedtime  sodium chloride 0.9%. 1000 milliLiter(s) IV Continuous <Continuous>    MEDICATIONS  (PRN):  acetaminophen     Tablet .. 650 milliGRAM(s) Oral every 6 hours PRN Temp greater or equal to 38C (100.4F), Mild Pain (1 - 3)  melatonin 3 milliGRAM(s) Oral at bedtime PRN Insomnia  ondansetron Injectable 4 milliGRAM(s) IV Push every 8 hours PRN Nausea and/or Vomiting    OBJECTIVE:    PHYSICAL EXAM:       ICU Vital Signs Last 24 Hrs  T(C): 36.6 (13 May 2022 06:01), Max: 36.6 (12 May 2022 22:42)  T(F): 97.9 (13 May 2022 06:01), Max: 97.9 (12 May 2022 22:42)  HR: 71 (13 May 2022 06:01) (57 - 71)  BP: 116/67 (13 May 2022 06:01) (114/62 - 127/75)  BP(mean): --  ABP: --  ABP(mean): --  RR: 18 (13 May 2022 06:01) (18 - 18)  SpO2: 96% (13 May 2022 06:01) (96% - 98%) on room air     General: Awake. Alert. Cooperative. No distress. Appears stated age.  HEENT: Atraumatic. Normocephalic. Anicteric. Normal oral mucosa. PERRL. EOMI. Left parotid mass.  Neck: Supple. Trachea midline. Thyroid without enlargement/tenderness/nodules. No carotid bruit. No JVD.	  Cardiovascular: Regular rate and rhythm. S1 S2 normal. No murmurs, rubs or gallops.  Respiratory: Respirations unlabored. Right sided rales. No curvature.  Abdomen: Soft. Non-tender. Non-distended. No organomegaly. No masses. Normal bowel sounds.  Extremities: Warm to touch. No clubbing or cyanosis. No pedal edema.  Pulses: 2+ peripheral pulses all extremities.	  Skin: Normal skin color. No rashes or lesions. No ecchymoses. No cyanosis. Warm to touch.  Lymph Nodes: Cervical, supraclavicular and axillary nodes normal  Neurological: Motor and sensory examination equal and normal. A and O x 3  Psychiatry: Appropriate mood and affect.    LABS:                          12.2   6.09  )-----------( 283      ( 12 May 2022 07:15 )             37.5     CBC    WBC  6.09 <==, 8.06 <==    Hemoglobin  12.2 <<==, 12.1 <<==    Hematocrit  37.5 <==, 37.8 <==    Platelets  283 <==, 288 <==      138  |  105  |  18  ----------------------------<  95    05-12  5.0   |  23  |  0.90      LYTES    sodium  138 <==, 139 <==    potassium   5.0 <==, 4.0 <==    chloride  105 <==, 104 <==    carbon dioxide  23 <==, 22 <==    =============================================================================================  RENAL FUNCTION:    Creatinine:   0.90  <<==, 0.81  <<==    BUN:   18 <==, 16 <==    ============================================================================================    calcium   9.0 <==, 9.6 <==    phos   2.6 <==    mag   2.1 <==    ============================================================================================  LFTs    AST:   16 <== , 20 <==     ALT:  17  <== , 21  <==     AP:  75  <=, 80  <=    Bili:  1.3  <=, 0.5  <=      PT/INR - ( 10 May 2022 23:08 )   PT: 13.9 sec;   INR: 1.21 ratio      CARDIAC MARKERS ( 12 May 2022 07:15 )  CPK 99 U/L /CKMB x     /CKMB Units 3.2 ng/mL    troponin 11 ng/L    MICROBIOLOGY:     Respiratory Viral Panel with COVID-19 by CASSIUS (05.10.22 @ 23:07)   Rapid RVP Result: Detected   SARS-CoV-2: Detected  This Respiratory Panel uses polymerase chain reaction (PCR) to detect for   adenovirus; coronavirus (HKU1, NL63, 229E, OC43); human metapneumovirus   (hMPV); human enterovirus/rhinovirus (Entero/RV); influenza A; influenza   A/H1; influenza A/H3; influenza A/H1-2009; influenza B; parainfluenza   viruses 1, 2, 3, 4; respiratory syncytial virus; Mycoplasma pneumoniae;   Chlamydophila pneumoniae; and SARS-CoV-2.     RADIOLOGY:  [x] Chest radiographs reviewed and interpreted by me    EXAM:  XR CHEST PORTABLE URGENT 1V                          PROCEDURE DATE:  05/10/2022      FINDINGS:    There is a semicircular pleural-based density in the right apex.  There are no focal infiltrates.  No pleural effusion or pneumothorax.  The heart is not enlarged.  No acute osseous abnormality.    IMPRESSION:  Right apical pleural-based density which was not present on the prior   study. Correlation with chest CT is recommended.      DAVE ARRINGTON MD; Resident Radiology  This document has been electronically signed.  OTILIO AUGUSTINE MD; Attending Radiologist  This document has been electronically signed. May 11 2022  1:15PM  ---------------------------------------------------------------------------------------------------------------  EXAM:  CT CHEST                          PROCEDURE DATE:  05/11/2022      FINDINGS:    LYMPH NODES: No enlarged hiatal hernia. There are no enlarged chest lymph   nodes. The thyroid gland is normal.    HEART/VASCULATURE: The heart is normal in size. No pericardial effusion.   There are aortic and coronary artery calcifications. Mitral annular   calcifications    AIRWAYS/LUNGS/PLEURA:  Right upper lobe tree-in-bud opacities are new   since 12/14/2017.    Interval increase in lower lobe and lingular reticular opacities.    Biapical pleural opacity with calcifications and bilateral calcified   nodules are unchanged. In addition, there are pulmonary nodules which are   unchanged. The largest nodule measures 6 mm in the right lower lobe   (series 3 image 111).    The bilateral peripheral areas of cystic change are unchanged since 2017   and are best shown in the bilateral upper lobes.    No pleural effusion. Patent central airways.    UPPER ABDOMEN: Within normal limits.    BONES/SOFT TISSUES: Degenerative changes of the spine.    IMPRESSION:  1.  There are new right upper lobe tree-in-bud opacities, findings are   concerning for infection, including atypical infection such as   mycobacterium given the history.  2.  Interval increase in lower lobe and lingular reticular opacities, and   the bilateral lower lobe groundglass opacities are of uncertain etiology.     ELIGIO DO MD; Resident Interventional Radiology  This document has been electronically signed.  MATY BENJAMIN MD; Attending Radiologist  This document has been electronically signed. May 12 2022 11:12AM  ---------------------------------------------------------------------------------------------------------------         [facility] - PROGRESS NOTE    CHIEF COMPLAINT: abnormal chest CT; tuberculosis; COVID-19 infection; interstitial lung disease; COPD/emphysema; pulmonary granuloma; smoker    INTERVAL HISTORY: much calmer -> has provided 3 sputum samples for AFB testing - hoping for discharge with home isolation; tolerating RIPE therapy - felt not to require steroids, remdesivir or monoclonal antibodies by ID; no shortness of breath or hypoxemia on room air; no worsening of his chronic cough, sputum production, chest congestion or wheeze; no fevers, chills or sweats; no chest pain/pressure or palpitations; good appetite; no further bouts of lightheadedness or dizziness (has received almost 3 liters of IV fluids)    REVIEW OF SYSTEMS:  Constitutional: As per interval history  HEENT: Within normal limits  CV: As per interval history  Resp: As per interval history  GI: Within normal limits   : Within normal limits  Musculoskeletal: Within normal limits  Skin: Within normal limits  Neurological: Within normal limits  Psychiatric: Within normal limits  Endocrine: Within normal limits  Hematologic/Lymphatic: Within normal limits  Allergic/Immunologic: Within normal limits    MEDICATIONS:     Pulmonary "  benzonatate 200 milliGRAM(s) Oral three times a day  guaifenesin/dextromethorphan Oral Liquid 10 milliLiter(s) Oral four times a day  loratadine 10 milliGRAM(s) Oral daily    Anti-microbials:  ethambutol 1200 milliGRAM(s) Oral daily  isoniazid 300 milliGRAM(s) Oral daily  pyrazinamide 1500 milliGRAM(s) Oral daily  rifAMPin 600 milliGRAM(s) Oral daily    Cardiovascular:  enalapril 2.5 milliGRAM(s) Oral daily  tamsulosin 0.8 milliGRAM(s) Oral at bedtime    Other:  aspirin enteric coated 81 milliGRAM(s) Oral daily  buPROPion XL (24-Hour) . 300 milliGRAM(s) Oral daily  chlorhexidine 2% Cloths 1 Application(s) Topical daily  cholecalciferol 1000 Unit(s) Oral daily  clopidogrel Tablet 75 milliGRAM(s) Oral daily  enoxaparin Injectable 40 milliGRAM(s) SubCutaneous every 24 hours  fluticasone propionate 50 MICROgram(s)/spray Nasal Spray 1 Spray(s) Both Nostrils two times a day  pantoprazole    Tablet 40 milliGRAM(s) Oral two times a day  pyridoxine 50 milliGRAM(s) Oral daily  simvastatin 40 milliGRAM(s) Oral at bedtime  sodium chloride 0.9%. 1000 milliLiter(s) IV Continuous <Continuous>    MEDICATIONS  (PRN):  acetaminophen     Tablet .. 650 milliGRAM(s) Oral every 6 hours PRN Temp greater or equal to 38C (100.4F), Mild Pain (1 - 3)  melatonin 3 milliGRAM(s) Oral at bedtime PRN Insomnia  ondansetron Injectable 4 milliGRAM(s) IV Push every 8 hours PRN Nausea and/or Vomiting    OBJECTIVE:    PHYSICAL EXAM:       ICU Vital Signs Last 24 Hrs  T(C): 36.6 (13 May 2022 06:01), Max: 36.6 (12 May 2022 22:42)  T(F): 97.9 (13 May 2022 06:01), Max: 97.9 (12 May 2022 22:42)  HR: 71 (13 May 2022 06:01) (57 - 71)  BP: 116/67 (13 May 2022 06:01) (114/62 - 127/75)  BP(mean): --  ABP: --  ABP(mean): --  RR: 18 (13 May 2022 06:01) (18 - 18)  SpO2: 96% (13 May 2022 06:01) (96% - 98%) on room air     General: Awake. Alert. Cooperative. No distress. Appears stated age.  HEENT: Atraumatic. Normocephalic. Anicteric. Normal oral mucosa. PERRL. EOMI. Left parotid mass.  Neck: Supple. Trachea midline. Thyroid without enlargement/tenderness/nodules. No carotid bruit. No JVD.	  Cardiovascular: Regular rate and rhythm. S1 S2 normal. No murmurs, rubs or gallops.  Respiratory: Respirations unlabored. Right sided rales. No curvature.  Abdomen: Soft. Non-tender. Non-distended. No organomegaly. No masses. Normal bowel sounds.  Extremities: Warm to touch. No clubbing or cyanosis. No pedal edema.  Pulses: 2+ peripheral pulses all extremities.	  Skin: Normal skin color. No rashes or lesions. No ecchymoses. No cyanosis. Warm to touch.  Lymph Nodes: Cervical, supraclavicular and axillary nodes normal  Neurological: Motor and sensory examination equal and normal. A and O x 3  Psychiatry: Appropriate mood and affect.    LABS:                          12.2   6.09  )-----------( 283      ( 12 May 2022 07:15 )             37.5     CBC    WBC  6.09 <==, 8.06 <==    Hemoglobin  12.2 <<==, 12.1 <<==    Hematocrit  37.5 <==, 37.8 <==    Platelets  283 <==, 288 <==      138  |  105  |  18  ----------------------------<  95    05-12  5.0   |  23  |  0.90      LYTES    sodium  138 <==, 139 <==    potassium   5.0 <==, 4.0 <==    chloride  105 <==, 104 <==    carbon dioxide  23 <==, 22 <==    =============================================================================================  RENAL FUNCTION:    Creatinine:   0.90  <<==, 0.81  <<==    BUN:   18 <==, 16 <==    ============================================================================================    calcium   9.0 <==, 9.6 <==    phos   2.6 <==    mag   2.1 <==    ============================================================================================  LFTs    AST:   16 <== , 20 <==     ALT:  17  <== , 21  <==     AP:  75  <=, 80  <=    Bili:  1.3  <=, 0.5  <=      PT/INR - ( 10 May 2022 23:08 )   PT: 13.9 sec;   INR: 1.21 ratio      CARDIAC MARKERS ( 12 May 2022 07:15 )  CPK 99 U/L /CKMB x     /CKMB Units 3.2 ng/mL    troponin 11 ng/L    MICROBIOLOGY:     Respiratory Viral Panel with COVID-19 by CASSIUS (05.10.22 @ 23:07)   Rapid RVP Result: Detected   SARS-CoV-2: Detected  This Respiratory Panel uses polymerase chain reaction (PCR) to detect for   adenovirus; coronavirus (HKU1, NL63, 229E, OC43); human metapneumovirus   (hMPV); human enterovirus/rhinovirus (Entero/RV); influenza A; influenza   A/H1; influenza A/H3; influenza A/H1-2009; influenza B; parainfluenza   viruses 1, 2, 3, 4; respiratory syncytial virus; Mycoplasma pneumoniae;   Chlamydophila pneumoniae; and SARS-CoV-2.     Culture - Acid Fast - Sputum w/Smear . (05.12.22 @ 13:15)   Specimen Source: .Sputum Sputum   Acid Fast Bacilli Smear:   Specimen sent to [facility] as per [facility] request     Culture - Acid Fast - Sputum w/Smear . (05.12.22 @ 23:09)   Specimen Source: .Sputum Sputum   Acid Fast Bacilli Smear:   Rare Acid fast bacillus seen by fluorochrome stain.     Culture - Acid Fast - Sputum w/Smear . (05.13.22 @ 06:37)   Specimen Source: .Sputum Sputum   Acid Fast Bacilli Smear:   No acid fast bacilli seen by fluorochrome stain     RADIOLOGY:  [x] Chest radiographs reviewed and interpreted by me    EXAM:  XR CHEST PORTABLE URGENT 1V                          PROCEDURE DATE:  05/10/2022      FINDINGS:    There is a semicircular pleural-based density in the right apex.  There are no focal infiltrates.  No pleural effusion or pneumothorax.  The heart is not enlarged.  No acute osseous abnormality.    IMPRESSION:  Right apical pleural-based density which was not present on the prior   study. Correlation with chest CT is recommended.      DAVE ARRINGTON MD; Resident Radiology  This document has been electronically signed.  OTILIO AUGUSTINE MD; Attending Radiologist  This document has been electronically signed. May 11 2022  1:15PM  ---------------------------------------------------------------------------------------------------------------  EXAM:  CT CHEST                          PROCEDURE DATE:  05/11/2022      FINDINGS:    LYMPH NODES: No enlarged hiatal hernia. There are no enlarged chest lymph   nodes. The thyroid gland is normal.    HEART/VASCULATURE: The heart is normal in size. No pericardial effusion.   There are aortic and coronary artery calcifications. Mitral annular   calcifications    AIRWAYS/LUNGS/PLEURA:  Right upper lobe tree-in-bud opacities are new   since 12/14/2017.    Interval increase in lower lobe and lingular reticular opacities.    Biapical pleural opacity with calcifications and bilateral calcified   nodules are unchanged. In addition, there are pulmonary nodules which are   unchanged. The largest nodule measures 6 mm in the right lower lobe   (series 3 image 111).    The bilateral peripheral areas of cystic change are unchanged since 2017   and are best shown in the bilateral upper lobes.    No pleural effusion. Patent central airways.    UPPER ABDOMEN: Within normal limits.    BONES/SOFT TISSUES: Degenerative changes of the spine.    IMPRESSION:  1.  There are new right upper lobe tree-in-bud opacities, findings are   concerning for infection, including atypical infection such as   mycobacterium given the history.  2.  Interval increase in lower lobe and lingular reticular opacities, and   the bilateral lower lobe groundglass opacities are of uncertain etiology.     ELIGIO DO MD; Resident Interventional Radiology  This document has been electronically signed.  MATY BENJAMIN MD; Attending Radiologist  This document has been electronically signed. May 12 2022 11:12AM  ---------------------------------------------------------------------------------------------------------------

## 2022-05-13 NOTE — PROVIDER CONTACT NOTE (OTHER) - SITUATION
Discussed with MARLENE Moyer that patient is approved by [facility] for home isolation with Direct observation.

## 2022-05-13 NOTE — DISCHARGE NOTE PROVIDER - NSDCMRMEDTOKEN_GEN_ALL_CORE_FT
alfuzosin 10 mg oral tablet, extended release: 1 tab(s) orally once a day  aspirin 81 mg oral tablet: 1 tab(s) orally once a day  clopidogrel 75 mg oral tablet: 1 tab(s) orally once a day Resume Sunday   enalapril 2.5 mg oral tablet: 1 tab(s) orally once a day  ethambutol 400 mg oral tablet: 3 tab(s) orally once a day  isoniazid 300 mg oral tablet: 1 tab(s) orally once a day  omeprazole 40 mg oral delayed release capsule: 1 cap(s) orally 2 times a day  pyrazinamide 500 mg oral tablet: 3 tab(s) orally once a day  pyridoxine 50 mg oral tablet: 1 tab(s) orally once a day  rifAMPin 300 mg oral capsule: 2 cap(s) orally once a day  simvastatin 40 mg oral tablet: 1 tab(s) orally once a day (at bedtime)  Vitamin D3 1000 intl units oral capsule: 1 cap(s) orally once a day  Wellbutrin  mg/24 hours oral tablet, extended release: 1 tab(s) orally every 24 hours

## 2022-05-13 NOTE — PROGRESS NOTE ADULT - ASSESSMENT
ASSESSMENT:    66 year old gentleman, current smoker, with no known history of intrinsic lung disease. He has a history of HTN, CAD s/p PCI and MI with preserved LVEF, urinary retention s/p cystoscopy in 2019 for intervention on a urethral stricture and chronic appendicitis s/p laparoscopic resection in 2020. He has a chronic left parotid mass. He was seen in March by Dr. Tylor Polk in follow-up. Biopsy of this mass and a nearby node was negative for malignancy many years ago. 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 The patient is followed in the outpatient setting for pulmonary nodules. He developed a pulmonary infiltrate with mediastinal adenopathy earlier this year. Bronchoscopy with subcarinal and left hilar node biopsies was negative for neoplasm and granulomatous inflammation. The right hilar node was not biopsied due to proximity to the pulmonary artery. Sampling of the right upper lobe lesion was also without neoplasm. AFB cultures have been negative x 8 weeks. Follow-up chest CT on March 29th -> patent central airways - stable peribronchial thickening with fine peripheral groundglass reticular opacities c/w mild interstitial lung disease - stable mild emphysema - stable right upper lobe granuloma - progression of the patchy branching right apical consolidation with a tree in bud configuration extending to the medial, lateral and apical pleural surfaces. A sputum specimen was recently collected due to ongoing cough with scant sputum production -> mycobacterium tuberculosis. The patient was also recently exposed to his wife with COVID and has tested positive for this infection. He was advised to come to the ER for evaluation and treatment. He has no shortness of breath or hypoxemia on room air. His chronic cough is no worse than usual and is associated with a post nasal drip. He has no chest congestion or wheeze. He has no fevers, chills or sweats. No chest pain/pressure or palpitations. His appetite is good and he has not lost weight.    right upper lobe active tuberculosis    COVID-19 infection without dyspnea or hypoxemia    chronic cough due to post nasal drip syndrome now exacerbated by lung infections    mild interstitial lung disease and emphysema    5/12 - demanding for transfer to a hospital that will give him monoclonal antibodies for his asymptomatic COVID infection - he called Jamil Rao who threatened myself and the hospital if the "appropriate" treatment is not given; patient fell on his back and left elbow overnight after becoming lightheaded and "queazy" walking to the bathroom -> found to be orthostatic and being given IV fluids    PLAN/RECOMMENDATIONS:    stable oxygenation on room air  airborne and contact isolation    chest CT -> progression of right upper lobe tree-in-bud opacities -  increase in lower lobe and lingular reticular opacities - biapical pleural opacity with calcifications and bilateral calcified nodules are unchanged- non calcified pulmonary nodules are unchanged - the largest nodule measures 6 mm in the right lower lobe - bilateral peripheral areas of cystic change are unchanged     no indication for nebs or antibacterial antibiotics  robitussin DM/tessalon  claritin/flonase    ID evaluation noted     not a candidate for monoclonal antibodies - risk factors for progression of COVID disease include emphysema, hypertension, coronary artery disease and age - however he is vaccinated with MODERNA x 4 having received a second booster on 3/20/22 and the current omicron strain is much less virulent than prior strains     does not meet criteria for remdesivir or steroids     AFB smear is positive -> will need isolation until 2 weeks of anti mycobacterial therapy     await results of the 3 sputum smears/cultures in the lab     continue INH/rifampin/ethambutol/pyrazinamide/B6 following LFTs and respiratory status - outpatient opthalmology evaluation  cardiac meds: ASA/plavix/enalapril/zocor  diligent DVT prophylaxis - SQ lovenox 40mg SQ daily  GI prophylaxis - protonix  awaiting ECHO  discontinue IV fluids    Will follow with you. Plan of care discussed with the patient at bedside and with Dr. Gracia. Hoping for discharge with home isolation and directly observed therapy.    Ramakrishna Russell MD, [facility]  [phone]  Pulmonary Medicine     ASSESSMENT:    66 year old gentleman, current smoker, with no known history of intrinsic lung disease. He has a history of HTN, CAD s/p PCI and MI with preserved LVEF, urinary retention s/p cystoscopy in 2019 for intervention on a urethral stricture and chronic appendicitis s/p laparoscopic resection in 2020. He has a chronic left parotid mass. He was seen in March by Dr. Tylor Polk in follow-up. Biopsy of this mass and a nearby node was negative for malignancy many years ago. 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 The patient is followed in the outpatient setting for pulmonary nodules. He developed a pulmonary infiltrate with mediastinal adenopathy earlier this year. Bronchoscopy with subcarinal and left hilar node biopsies was negative for neoplasm and granulomatous inflammation. The right hilar node was not biopsied due to proximity to the pulmonary artery. Sampling of the right upper lobe lesion was also without neoplasm. AFB cultures have been negative x 8 weeks. Follow-up chest CT on March 29th -> patent central airways - stable peribronchial thickening with fine peripheral groundglass reticular opacities c/w mild interstitial lung disease - stable mild emphysema - stable right upper lobe granuloma - progression of the patchy branching right apical consolidation with a tree in bud configuration extending to the medial, lateral and apical pleural surfaces. A sputum specimen was recently collected due to ongoing cough with scant sputum production -> mycobacterium tuberculosis. The patient was also recently exposed to his wife with COVID and has tested positive for this infection. He was advised to come to the ER for evaluation and treatment. He has no shortness of breath or hypoxemia on room air. His chronic cough is no worse than usual and is associated with a post nasal drip. He has no chest congestion or wheeze. He has no fevers, chills or sweats. No chest pain/pressure or palpitations. His appetite is good and he has not lost weight.    right upper lobe active tuberculosis    COVID-19 infection without dyspnea or hypoxemia    chronic cough due to post nasal drip syndrome now exacerbated by lung infections    mild interstitial lung disease and emphysema    5/12 - demanding for transfer to a hospital that will give him monoclonal antibodies for his asymptomatic COVID infection - he called Jamil Rao who threatened myself and the hospital if the "appropriate" treatment is not given; patient fell on his back and left elbow overnight after becoming lightheaded and "queazy" walking to the bathroom -> found to be orthostatic and being given IV fluids    PLAN/RECOMMENDATIONS:    stable oxygenation on room air  airborne and contact isolation    chest CT -> progression of right upper lobe tree-in-bud opacities -  increase in lower lobe and lingular reticular opacities - biapical pleural opacity with calcifications and bilateral calcified nodules are unchanged- non calcified pulmonary nodules are unchanged - the largest nodule measures 6 mm in the right lower lobe - bilateral peripheral areas of cystic change are unchanged     no indication for nebs or antibacterial antibiotics  robitussin DM/tessalon  claritin/flonase    ID evaluation noted     not a candidate for monoclonal antibodies - risk factors for progression of COVID disease include emphysema, hypertension, coronary artery disease and age - however he is vaccinated with MODERNA x 4 having received a second booster on 3/20/22 and the current omicron strain is much less virulent than prior strains     does not meet criteria for remdesivir or steroids     AFB smear is positive -> will need isolation until 2 weeks of anti mycobacterial therapy     await results of the 3 sputum smears/cultures in the lab -> see above     continue INH/rifampin/ethambutol/pyrazinamide/B6 following LFTs and respiratory status - outpatient opthalmology evaluation  cardiac meds: ASA/plavix/enalapril/zocor  diligent DVT prophylaxis - SQ lovenox 40mg SQ daily  GI prophylaxis - protonix  awaiting ECHO  discontinue IV fluids    Will follow with you. Plan of care discussed with the patient at bedside and with Dr. Gracia. Hoping for discharge with home isolation and directly observed therapy.    Ramakrishna Russell MD, [facility]  [phone]  Pulmonary Medicine

## 2022-05-13 NOTE — PROGRESS NOTE ADULT - SUBJECTIVE AND OBJECTIVE BOX
Follow Up:  M tb, covid    Interval History/ROS:  feels ok, intermittent cough    Allergies  No Known Allergies    ANTIMICROBIALS:  ethambutol 1200 daily  isoniazid 300 daily  pyrazinamide 1500 daily  rifAMPin 600 daily    OTHER MEDS:  MEDICATIONS  (STANDING):  acetaminophen     Tablet .. 650 every 6 hours PRN  aspirin enteric coated 81 daily  benzonatate 200 three times a day  buPROPion XL (24-Hour) . 300 daily  clopidogrel Tablet 75 daily  enalapril 2.5 daily  enoxaparin Injectable 40 every 24 hours  guaifenesin/dextromethorphan Oral Liquid 10 four times a day  loratadine 10 daily  melatonin 3 at bedtime PRN  ondansetron Injectable 4 every 8 hours PRN  pantoprazole    Tablet 40 two times a day  simvastatin 40 at bedtime  tamsulosin 0.8 at bedtime      Vital Signs Last 24 Hrs  T(C): 36.1 (13 May 2022 12:02), Max: 36.6 (12 May 2022 22:42)  T(F): 97 (13 May 2022 12:02), Max: 97.9 (12 May 2022 22:42)  HR: 53 (13 May 2022 12:02) (53 - 71)  BP: 116/63 (13 May 2022 12:02) (116/63 - 127/75)  BP(mean): --  RR: 16 (13 May 2022 12:02) (16 - 18)  SpO2: 97% (13 May 2022 12:02) (96% - 98%)  ROOM AIR    PHYSICAL EXAM:  General: WN/WD NAD, Non-toxic, voice sl hoarse, no distress, rare cough  Neurology: A&Ox3, nonfocal  Respiratory: Clear to auscultation bilaterally with few crackles on right  CV: RRR, S1S2, no murmurs, rubs or gallops  Abdominal: Soft, Non-tender, non-distended, normal bowel sounds  Extremities: No edema, + peripheral pulses  Line Sites: Clear  Skin: No rash                        11.8   5.58  )-----------( 272      ( 13 May 2022 07:31 )             35.9     05-13    135  |  104  |  16  ----------------------------<  93  4.2   |  19<L>  |  0.76    Ca    8.9      13 May 2022 07:36  Phos  2.6     05-12  Mg     2.1     05-12    TPro  6.8  /  Alb  4.1  /  TBili  1.3<H>  /  DBili  x   /  AST  16  /  ALT  17  /  AlkPhos  75  05-12      MICROBIOLOGY:  .Sputum Sputum  05-12-22 --  --  --  Rare Acid fast bacillus seen by fluorochrome stain.     Rapid RVP Result: Detected (05-10 @ 23:07)      RADIOLOGY:  < from: CT Chest No Cont (05.11.22 @ 22:17) >    IMPRESSION:  1.  There are new right upper lobe tree-in-bud opacities, findings are   concerning for infection, including atypical infection such as   mycobacterium given the history.  2.  Interval increase in lower lobe and lingular reticular opacities, and   the bilateral lower lobe groundglass opacities are of uncertain etiology.    < end of copied text >      Everardo Gracia MD; Division of Infectious Disease; Pager: [phone]; nights and weekends: [phone]

## 2022-05-13 NOTE — DISCHARGE NOTE PROVIDER - NSDCCPCAREPLAN_GEN_ALL_CORE_FT
PRINCIPAL DISCHARGE DIAGNOSIS  Diagnosis: Mycobacterium tuberculosis infection  Assessment and Plan of Treatment:       SECONDARY DISCHARGE DIAGNOSES  Diagnosis: 2019 novel coronavirus disease (COVID-19)  Assessment and Plan of Treatment:

## 2022-05-13 NOTE — PROGRESS NOTE ADULT - PROBLEM SELECTOR PLAN 3
-  -likely from dehydration  -symptoms resolved  -orthostatic vitals stable  -check echocardiogram    patient follows with dr master singh (cardiologist)    Rao Candelaria D.O.  [phone].

## 2022-05-13 NOTE — PROGRESS NOTE ADULT - ASSESSMENT
66M pmhx coronary artery disease, acute inferior wall MI s/p KEVIN to RCA (2007), hypertension, hyperlipidemia, tobacco use, depression, and esophageal reflux, presents to [facility] for positive sputum culture for Mycobacterium tuberculosis.    # COVID 19  does not qualify for MAB  ID following  stable on RA, asymptomatic  no indication for remdesivir and decadron    # mechanical fall  xray pelvis and elbow no fx  CT head no bleed  orthostatics improved sp IVF  TTE pending    # Mycobacterium tuberculosis infection  cont rx for TB per ID  dw ID and pulm  fu AFB  x  3 per ANASTASIYA  ID in discussion with ANASTASIYA [facility] observed therapy at home    # Major depression  cont wellbutrin    # CAD   cont simvastatin, aspirin, plavix, enalapril    # BPH  cont tamsulosin    dvt ppx lovenox due to covid infection    Please call [facility] with questions [phone].

## 2022-05-13 NOTE — PROGRESS NOTE ADULT - PROVIDER SPECIALTY LIST ADULT
Pulmonology
Internal Medicine
Pulmonology
Infectious Disease
Infectious Disease
Internal Medicine
Cardiology

## 2022-05-13 NOTE — PROGRESS NOTE ADULT - PROBLEM SELECTOR PLAN 2
-  -treatment as per primary team Shasha Porras Prenatal Vit-Fe Fumarate-FA (PRENATAL 40 annually  Return to clinic: prn

## 2022-05-13 NOTE — DISCHARGE NOTE PROVIDER - HOSPITAL COURSE
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 Risk factors for severe covid include emphysema, HTN, CAD, age and gender, but he is vaccinated with MODERNA x4 - second booster on 3/20/22 and current omicron strain has been associated with much less virulence in vaccinated recipients.   Mtb treatment is priority.     stated weight 190#  Tolerating RIPE 5/11/22 -->    LFTs 5/12 are WNL  mild covid, oxygenating well on room air  syncope episode  no recurrence  5/12 rare afb    Suggest  Continue:   mg daily  Rifampin 600 mg daily  Ethambutol 1200 daily  Pyrazinamide 1500 mg daily  B6 50 mg daily  monitor oxygenation status, lfts     discussed with ANASTASIYA - no young children at home,  -approves discharge with home isolation -they are discussing precautions with patient  ANASTASIYA will provide Directly Observed Therapy at home  discharge with supply of TB meds  ID office will contact patient and provide appointment in 2-3 weeks    Nurse will provide 5 sputum cups upon discharge so that ID can follow at home and monitor clearance of AFB from sputum.

## 2022-05-13 NOTE — PROGRESS NOTE ADULT - SUBJECTIVE AND OBJECTIVE BOX
Patient is a 66y old  Male who presents with a chief complaint of +sputum culture (13 May 2022 09:23)      SUBJECTIVE / OVERNIGHT EVENTS:    Patient seen and examined. denies cp sob dizziness. feels well. orthos improved.      Vital Signs Last 24 Hrs  T(C): 36.1 (13 May 2022 12:02), Max: 36.6 (12 May 2022 22:42)  T(F): 97 (13 May 2022 12:02), Max: 97.9 (12 May 2022 22:42)  HR: 53 (13 May 2022 12:02) (53 - 71)  BP: 116/63 (13 May 2022 12:02) (116/63 - 127/75)  BP(mean): --  RR: 16 (13 May 2022 12:02) (16 - 18)  SpO2: 97% (13 May 2022 12:02) (96% - 98%)  I&O's Summary    12 May 2022 07:01  -  13 May 2022 07:00  --------------------------------------------------------  IN: 2635 mL / OUT: 400 mL / NET: 2235 mL    13 May 2022 07:01  -  13 May 2022 13:37  --------------------------------------------------------  IN: 620 mL / OUT: 200 mL / NET: 420 mL        PE:  GENERAL: NAD, AAOx3  HEAD:  Atraumatic, Normocephalic  CHEST/LUNG: CTABL, No wheeze  HEART: Regular rate and rhythm; no murmur  ABDOMEN: Soft, Nontender, Nondistended; Bowel sounds present  EXTREMITIES:  2+ Peripheral Pulses, No clubbing, cyanosis, or edema  SKIN: No rashes or lesions  NEURO: No focal deficits    LABS:                        11.8   5.58  )-----------( 272      ( 13 May 2022 07:31 )             35.9     05-13    135  |  104  |  16  ----------------------------<  93  4.2   |  19<L>  |  0.76    Ca    8.9      13 May 2022 07:36  Phos  2.6     05-12  Mg     2.1     05-12    TPro  6.8  /  Alb  4.1  /  TBili  1.3<H>  /  DBili  x   /  AST  16  /  ALT  17  /  AlkPhos  75  05-12      CAPILLARY BLOOD GLUCOSE        CARDIAC MARKERS ( 12 May 2022 07:15 )  x     / x     / 99 U/L / x     / 3.2 ng/mL          RADIOLOGY & ADDITIONAL TESTS:    Imaging Personally Reviewed:  [x] YES  [ ] NO    Consultant(s) Notes Reviewed:  [x] YES  [ ] NO    MEDICATIONS  (STANDING):  aspirin enteric coated 81 milliGRAM(s) Oral daily  benzonatate 200 milliGRAM(s) Oral three times a day  buPROPion XL (24-Hour) . 300 milliGRAM(s) Oral daily  chlorhexidine 2% Cloths 1 Application(s) Topical daily  cholecalciferol 1000 Unit(s) Oral daily  clopidogrel Tablet 75 milliGRAM(s) Oral daily  enalapril 2.5 milliGRAM(s) Oral daily  enoxaparin Injectable 40 milliGRAM(s) SubCutaneous every 24 hours  ethambutol 1200 milliGRAM(s) Oral daily  fluticasone propionate 50 MICROgram(s)/spray Nasal Spray 1 Spray(s) Both Nostrils two times a day  guaifenesin/dextromethorphan Oral Liquid 10 milliLiter(s) Oral four times a day  isoniazid 300 milliGRAM(s) Oral daily  loratadine 10 milliGRAM(s) Oral daily  mupirocin 2% Ointment 1 Application(s) Both Nostrils two times a day  pantoprazole    Tablet 40 milliGRAM(s) Oral two times a day  pyrazinamide 1500 milliGRAM(s) Oral daily  pyridoxine 50 milliGRAM(s) Oral daily  rifAMPin 600 milliGRAM(s) Oral daily  simvastatin 40 milliGRAM(s) Oral at bedtime  tamsulosin 0.8 milliGRAM(s) Oral at bedtime    MEDICATIONS  (PRN):  acetaminophen     Tablet .. 650 milliGRAM(s) Oral every 6 hours PRN Temp greater or equal to 38C (100.4F), Mild Pain (1 - 3)  melatonin 3 milliGRAM(s) Oral at bedtime PRN Insomnia  ondansetron Injectable 4 milliGRAM(s) IV Push every 8 hours PRN Nausea and/or Vomiting      Care Discussed with Consultants/Other Providers [x] YES  [ ] NO    HEALTH ISSUES - PROBLEM Dx:  2019 novel coronavirus disease (COVID-19)    Mycobacterium tuberculosis infection    Prophylactic measure    Major depression    CAD (coronary artery disease)    Orthostatic hypotension

## 2022-05-13 NOTE — PROVIDER CONTACT NOTE (OTHER) - RECOMMENDATIONS
notified Moyer that patient should be supplied with 2 weeks TB medication prior to discharge as well as a box of masks and 5 sputum collection containers. The patietn will be followed by Dr. Gracia

## 2022-05-13 NOTE — PROGRESS NOTE ADULT - SUBJECTIVE AND OBJECTIVE BOX
[facility] Cardiology Progress Note  _______________________________    Pt. seen and examined. No new cardiac-related complaints.      T(C): 36.6 (05-13-22 @ 06:01), Max: 36.6 (05-12-22 @ 22:42)  HR: 71 (05-13-22 @ 06:01) (57 - 71)  BP: 116/67 (05-13-22 @ 06:01) (116/67 - 127/75)  RR: 18 (05-13-22 @ 06:01) (18 - 18)  SpO2: 96% (05-13-22 @ 06:01) (96% - 98%)  I&O's Summary    12 May 2022 07:01  -  13 May 2022 07:00  --------------------------------------------------------  IN: 2635 mL / OUT: 400 mL / NET: 2235 mL        PHYSICAL EXAM:  GENERAL: Alert, NAD.  NECK: Supple  CHEST/LUNG: Clear to auscultation bilaterally; No wheezes, rales, or rhonchi.  HEART: S1 S2 normal, RRR; No murmurs, rubs, or gallops  ABDOMEN: Soft, Nondistended  EXTREMITIES:  No LE edema.      LABS:                        11.8   5.58  )-----------( 272      ( 13 May 2022 07:31 )             35.9     05-13    135  |  104  |  16  ----------------------------<  93  4.2   |  19<L>  |  0.76    Ca    8.9      13 May 2022 07:36  Phos  2.6     05-12  Mg     2.1     05-12    TPro  6.8  /  Alb  4.1  /  TBili  1.3<H>  /  DBili  x   /  AST  16  /  ALT  17  /  AlkPhos  75  05-12      CARDIAC MARKERS ( 12 May 2022 07:15 )  x     / x     / 99 U/L / x     / 3.2 ng/mL              MEDICATIONS  (STANDING):  aspirin enteric coated 81 milliGRAM(s) Oral daily  benzonatate 200 milliGRAM(s) Oral three times a day  buPROPion XL (24-Hour) . 300 milliGRAM(s) Oral daily  chlorhexidine 2% Cloths 1 Application(s) Topical daily  cholecalciferol 1000 Unit(s) Oral daily  clopidogrel Tablet 75 milliGRAM(s) Oral daily  enalapril 2.5 milliGRAM(s) Oral daily  enoxaparin Injectable 40 milliGRAM(s) SubCutaneous every 24 hours  ethambutol 1200 milliGRAM(s) Oral daily  fluticasone propionate 50 MICROgram(s)/spray Nasal Spray 1 Spray(s) Both Nostrils two times a day  guaifenesin/dextromethorphan Oral Liquid 10 milliLiter(s) Oral four times a day  isoniazid 300 milliGRAM(s) Oral daily  loratadine 10 milliGRAM(s) Oral daily  pantoprazole    Tablet 40 milliGRAM(s) Oral two times a day  pyrazinamide 1500 milliGRAM(s) Oral daily  pyridoxine 50 milliGRAM(s) Oral daily  rifAMPin 600 milliGRAM(s) Oral daily  simvastatin 40 milliGRAM(s) Oral at bedtime  tamsulosin 0.8 milliGRAM(s) Oral at bedtime    MEDICATIONS  (PRN):  acetaminophen     Tablet .. 650 milliGRAM(s) Oral every 6 hours PRN Temp greater or equal to 38C (100.4F), Mild Pain (1 - 3)  melatonin 3 milliGRAM(s) Oral at bedtime PRN Insomnia  ondansetron Injectable 4 milliGRAM(s) IV Push every 8 hours PRN Nausea and/or Vomiting        RADIOLOGY & ADDITIONAL TESTS:

## 2022-05-13 NOTE — DISCHARGE NOTE NURSING/CASE MANAGEMENT/SOCIAL WORK - NSDCPEFALRISK_GEN_ALL_CORE
For information on Fall & Injury Prevention, visit: [email or web address] OR  [email or web address] OR  https://www.cdc.gov/steadi/patient.html

## 2022-05-13 NOTE — DISCHARGE NOTE NURSING/CASE MANAGEMENT/SOCIAL WORK - PATIENT PORTAL LINK FT
You can access the FollowMyHealth Patient Portal offered by [facility] by registering at the following website: [email or web address]. By joining [employer]’s FollowMyHealth portal, you will also be able to view your health information using other applications (apps) compatible with our system.

## 2022-05-13 NOTE — PROGRESS NOTE ADULT - ASSESSMENT
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 Risk factors for severe covid include emphysema, HTN, CAD, age and gender, but he is vaccinated with MODERNA x4 - second booster on 3/20/22 and current omicron strain has been associated with much less virulence in vaccinated recipients.   Mtb treatment is priority.     stated weight 190#  Tolerating RIPE 5/11/22 -->    LFTs 5/12 are WNL  mild covid, oxygenating well on room air  syncope episode  no recurrence  5/12 rare afb    Suggest  Continue:   mg daily  Rifampin 600 mg daily  Ethambutol 1200 daily  Pyrazinamide 1500 mg daily  B6 50 mg daily  monitor oxygenation status, lfts    discussed with ANASTASIYA - no young children at home,  -approves discharge with home isolation -they are discussing precautions with patient  ANASTASIYA will provide Directly Observed Therapy at home  discharge with supply of TB meds  My office will contact patient and provide appointment in 2-3 weeks    discussed with Pulmonary  discussed with primary attending - expediting echo    I am out this weekend - call ID service if needed

## 2022-05-13 NOTE — PROGRESS NOTE ADULT - ASSESSMENT
66M w/pmh coronary artery disease, acute inferior wall MI s/p KEVIN to RCA (2007), hypertension, hyperlipidemia, tobacco use, depression, and esophageal reflux, presents to [facility] for positive sputum culture for tb. covid positive.

## 2022-05-18 ENCOUNTER — LABORATORY RESULT (OUTPATIENT)
Age: 67
End: 2022-05-18

## 2022-05-20 LAB — BACTERIA SPT CULT: NORMAL

## 2022-05-29 ENCOUNTER — NON-APPOINTMENT (OUTPATIENT)
Age: 67
End: 2022-05-29

## 2022-06-02 NOTE — ASU PREOP CHECKLIST - TYPE OF SOLUTION
Persistent  Patient parent has an appointment with pulmonology and for pulmonary function testing in June at [address] - parents were unaware  They will contact that department to reschedule due to conflict  Patient will also follow up with [address] [place] [address] Pediatric pulmonology in July  Continue Flovent and albuterol    Recheck 3 months-earlier if needed LR

## 2022-06-03 ENCOUNTER — APPOINTMENT (OUTPATIENT)
Dept: INFECTIOUS DISEASE | Facility: CLINIC | Age: 67
End: 2022-06-03
Payer: MEDICARE

## 2022-06-03 VITALS
TEMPERATURE: 98.5 F | HEART RATE: 66 BPM | SYSTOLIC BLOOD PRESSURE: 129 MMHG | DIASTOLIC BLOOD PRESSURE: 77 MMHG | WEIGHT: 187 LBS | BODY MASS INDEX: 26.77 KG/M2 | RESPIRATION RATE: 16 BRPM | HEIGHT: 70 IN | OXYGEN SATURATION: 98 %

## 2022-06-03 DIAGNOSIS — U07.1 COVID-19: ICD-10-CM

## 2022-06-03 PROCEDURE — 99213 OFFICE O/P EST LOW 20 MIN: CPT

## 2022-06-03 RX ORDER — TADALAFIL 5 MG/1
5 TABLET, FILM COATED ORAL
Qty: 90 | Refills: 3 | Status: DISCONTINUED | COMMUNITY
End: 2022-06-03

## 2022-06-03 NOTE — PHYSICAL EXAM
[General Appearance - Alert] : alert [General Appearance - In No Acute Distress] : in no acute distress [Sclera] : the sclera and conjunctiva were normal [PERRL With Normal Accommodation] : pupils were equal in size, round, reactive to light [Extraocular Movements] : extraocular movements were intact [Outer Ear] : the ears and nose were normal in appearance [Oropharynx] : the oropharynx was normal with no thrush [Neck Appearance] : the appearance of the neck was normal [Neck Cervical Mass (___cm)] : no neck mass was observed [Jugular Venous Distention Increased] : there was no jugular-venous distention [Thyroid Diffuse Enlargement] : the thyroid was not enlarged [Heart Rate And Rhythm] : heart rate was normal and rhythm regular [Heart Sounds] : normal S1 and S2 [Heart Sounds Gallop] : no gallops [Murmurs] : no murmurs [Heart Sounds Pericardial Friction Rub] : no pericardial rub [Edema] : there was no peripheral edema [No Palpable Adenopathy] : no palpable adenopathy [Musculoskeletal - Swelling] : no joint swelling [Nail Clubbing] : no clubbing  or cyanosis of the fingernails [Motor Tone] : muscle strength and tone were normal [Skin Color & Pigmentation] : normal skin color and pigmentation [] : no rash [No Focal Deficits] : no focal deficits [Oriented To Time, Place, And Person] : oriented to person, place, and time [Affect] : the affect was normal [FreeTextEntry1] : fine crackles right base

## 2022-06-03 NOTE — CONSULT LETTER
[Dear  ___] : Dear  [unfilled], [Please see my note below.] : Please see my note below. [Consult Closing:] : Thank you very much for allowing me to participate in the care of this patient.  If you have any questions, please do not hesitate to contact me. [Sincerely,] : Sincerely, [FreeTextEntry2] : Dr Peyman Tellez\par [facility]\par [address]\[place] [address] [FreeTextEntry3] : Everardo Gracia MD, FACP, MAXWELL, AAHIVM\par Infectious Diseases\par [facility] [DrRemy  ___] : Dr. SLAUGHTER [DrRemy ___] : Dr. SLAUGHTER

## 2022-06-03 NOTE — ASSESSMENT
[Medical Care Issues] : medical care issues [FreeTextEntry1] : pulmonary tb\par tolerating treatment on RIPE 5/11/22-->\par recent afb smears negative\par \par awaiting susceptiblites - if pan susceptible and patient is doing well, will stop ETM, complete PZA 7/12/22 and continue 6 month course for INH/RIF 11/11/22\par If prolonged ETM - will require Ophthalmology exam\par \par \par checking lfts today\par

## 2022-06-03 NOTE — HISTORY OF PRESENT ILLNESS
[FreeTextEntry1] : ^%M recently diagnosed with pulmonary tuberculosis. On TB meds RIPE 5/11/22-->\par \par Doing well.\par Notes some fatigue - which has improved. Takes naps in afternoon\par continued cough - generally dry\par No fever, chills, sob\par taking meds regularly \par denies nausea, vomiting, diarrhea\par \par Sputum AFB:\par 5/5/22  growing AFB identified as Mtb susc to RIF\par 5/12 rare afb on smear\par 5/13 neg smear - growing AFB\par 5/16 Neg smear\par 5/17 Neg smear\par 5/18 Neg smear\par \par Hospitalized [facility] 5/11 --> 5/13/22\par 66M with coronary artery disease, acute inferior wall MI s/p KEVIN to RCA (2007), hypertension, hyperlipidemia, tobacco use, emphysema depression, and esophageal reflux, admitted 5/11/22 with 5/5/22 sputum showing RARE AFB and 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 [neg gene for RIF resistance]\par He is COVID+\par He was well apart from intermittent cough. Risk factors for severe covid include emphysema, HTN, CAD, age and gender, but he is vaccinated with MODERNA x4 - second booster on 3/20/22 and current omicron strain has been associated with much less virulence in vaccinated recipients. \par Mtb treatment is priority. \par \par CHEST CT 5/11/22: AIRWAYS/LUNGS/PLEURA:  Right upper lobe tree-in-bud opacities are new \par since 12/14/2017.\par Interval increase in lower lobe and lingular reticular opacities.\par Biapical pleural opacity with calcifications and bilateral calcified \par nodules are unchanged. In addition, there are pulmonary nodules which are \par unchanged. The largest nodule measures 6 mm in the right lower lobe \par (series 3 image 111).\par The bilateral peripheral areas of cystic change are unchanged since 2017 \par and are best shown in the bilateral upper lobes.\par No pleural effusion. Patent central airways.\par \par Tolerating RIPE 5/11/22 -->\par  LFTs 5/12 were WNL\par \par 5/10/22 + SARS CoV2

## 2022-06-06 LAB
25(OH)D3 SERPL-MCNC: 44.7 NG/ML
ALBUMIN SERPL ELPH-MCNC: 4.5 G/DL
ALP BLD-CCNC: 78 U/L
ALT SERPL-CCNC: 20 U/L
ANION GAP SERPL CALC-SCNC: 14 MMOL/L
AST SERPL-CCNC: 17 U/L
BASOPHILS # BLD AUTO: 0.05 K/UL
BASOPHILS NFR BLD AUTO: 0.7 %
BILIRUB SERPL-MCNC: 0.2 MG/DL
BUN SERPL-MCNC: 17 MG/DL
CALCIUM SERPL-MCNC: 9.6 MG/DL
CHLORIDE SERPL-SCNC: 102 MMOL/L
CO2 SERPL-SCNC: 25 MMOL/L
CREAT SERPL-MCNC: 0.87 MG/DL
EGFR: 96 ML/MIN/1.73M2
EOSINOPHIL # BLD AUTO: 0.24 K/UL
EOSINOPHIL NFR BLD AUTO: 3.4 %
GLUCOSE SERPL-MCNC: 94 MG/DL
HCT VFR BLD CALC: 41.7 %
HGB BLD-MCNC: 13 G/DL
IMM GRANULOCYTES NFR BLD AUTO: 0.8 %
LYMPHOCYTES # BLD AUTO: 1.49 K/UL
LYMPHOCYTES NFR BLD AUTO: 20.8 %
MAN DIFF?: NORMAL
MCHC RBC-ENTMCNC: 30.2 PG
MCHC RBC-ENTMCNC: 31.2 GM/DL
MCV RBC AUTO: 97 FL
MONOCYTES # BLD AUTO: 0.62 K/UL
MONOCYTES NFR BLD AUTO: 8.7 %
NEUTROPHILS # BLD AUTO: 4.7 K/UL
NEUTROPHILS NFR BLD AUTO: 65.6 %
PLATELET # BLD AUTO: 270 K/UL
POTASSIUM SERPL-SCNC: 5.1 MMOL/L
PROT SERPL-MCNC: 6.9 G/DL
RBC # BLD: 4.3 M/UL
RBC # FLD: 15.6 %
SODIUM SERPL-SCNC: 141 MMOL/L
WBC # FLD AUTO: 7.16 K/UL

## 2022-06-07 ENCOUNTER — NON-APPOINTMENT (OUTPATIENT)
Age: 67
End: 2022-06-07

## 2022-06-10 LAB
CULTURE RESULTS: SIGNIFICANT CHANGE UP
SPECIMEN SOURCE: SIGNIFICANT CHANGE UP

## 2022-06-20 LAB — FUNGUS SPT CULT: ABNORMAL

## 2022-06-21 ENCOUNTER — NON-APPOINTMENT (OUTPATIENT)
Age: 67
End: 2022-06-21

## 2022-06-23 ENCOUNTER — NON-APPOINTMENT (OUTPATIENT)
Age: 67
End: 2022-06-23

## 2022-06-23 LAB — FUNGUS SPT CULT: ABNORMAL

## 2022-06-24 ENCOUNTER — NON-APPOINTMENT (OUTPATIENT)
Age: 67
End: 2022-06-24

## 2022-06-24 LAB
ALBUMIN SERPL ELPH-MCNC: 4.2 G/DL
ALP BLD-CCNC: 83 U/L
ALT SERPL-CCNC: 41 U/L
ANION GAP SERPL CALC-SCNC: 13 MMOL/L
AST SERPL-CCNC: 27 U/L
BASOPHILS # BLD AUTO: 0.05 K/UL
BASOPHILS NFR BLD AUTO: 0.8 %
BILIRUB SERPL-MCNC: 1 MG/DL
BUN SERPL-MCNC: 13 MG/DL
CALCIUM SERPL-MCNC: 9.2 MG/DL
CHLORIDE SERPL-SCNC: 105 MMOL/L
CO2 SERPL-SCNC: 22 MMOL/L
CREAT SERPL-MCNC: 0.92 MG/DL
EGFR: 92 ML/MIN/1.73M2
EOSINOPHIL # BLD AUTO: 0.18 K/UL
EOSINOPHIL NFR BLD AUTO: 2.9 %
GLUCOSE SERPL-MCNC: 100 MG/DL
HCT VFR BLD CALC: 37 %
HGB BLD-MCNC: 11.9 G/DL
IMM GRANULOCYTES NFR BLD AUTO: 1.6 %
LYMPHOCYTES # BLD AUTO: 1.15 K/UL
LYMPHOCYTES NFR BLD AUTO: 18.3 %
MAN DIFF?: NORMAL
MCHC RBC-ENTMCNC: 30.4 PG
MCHC RBC-ENTMCNC: 32.2 GM/DL
MCV RBC AUTO: 94.6 FL
MONOCYTES # BLD AUTO: 0.74 K/UL
MONOCYTES NFR BLD AUTO: 11.8 %
NEUTROPHILS # BLD AUTO: 4.05 K/UL
NEUTROPHILS NFR BLD AUTO: 64.6 %
PLATELET # BLD AUTO: 299 K/UL
POTASSIUM SERPL-SCNC: 4.6 MMOL/L
PROT SERPL-MCNC: 7 G/DL
RBC # BLD: 3.91 M/UL
RBC # FLD: 14.9 %
SODIUM SERPL-SCNC: 140 MMOL/L
WBC # FLD AUTO: 6.27 K/UL

## 2022-06-26 ENCOUNTER — RX RENEWAL (OUTPATIENT)
Age: 67
End: 2022-06-26

## 2022-06-29 ENCOUNTER — RX RENEWAL (OUTPATIENT)
Age: 67
End: 2022-06-29

## 2022-06-29 LAB
CULTURE RESULTS: SIGNIFICANT CHANGE UP
SPECIMEN SOURCE: SIGNIFICANT CHANGE UP

## 2022-07-05 ENCOUNTER — APPOINTMENT (OUTPATIENT)
Dept: INFECTIOUS DISEASE | Facility: CLINIC | Age: 67
End: 2022-07-05

## 2022-07-05 VITALS
OXYGEN SATURATION: 97 % | TEMPERATURE: 98.6 F | HEART RATE: 76 BPM | DIASTOLIC BLOOD PRESSURE: 74 MMHG | SYSTOLIC BLOOD PRESSURE: 119 MMHG | WEIGHT: 180 LBS | HEIGHT: 70 IN | BODY MASS INDEX: 25.77 KG/M2

## 2022-07-05 DIAGNOSIS — Z15.09 GENETIC SUSCEPTIBILITY TO MALIGNANT NEOPLASM OF BREAST: ICD-10-CM

## 2022-07-05 DIAGNOSIS — Z15.01 GENETIC SUSCEPTIBILITY TO MALIGNANT NEOPLASM OF BREAST: ICD-10-CM

## 2022-07-05 PROCEDURE — 99213 OFFICE O/P EST LOW 20 MIN: CPT

## 2022-07-05 NOTE — CONSULT LETTER
[Dear  ___] : Dear  [unfilled], [Consult Letter:] : I had the pleasure of evaluating your patient, [unfilled]. [Please see my note below.] : Please see my note below. [Consult Closing:] : Thank you very much for allowing me to participate in the care of this patient.  If you have any questions, please do not hesitate to contact me. [Sincerely,] : Sincerely, [DrRemy  ___] : Dr. SLAUGHTER [DrRemy ___] : Dr. SLAUGHTER [FreeTextEntry2] : Dr Peyman Tellez\par [facility]\par [address]\[place] [address]  [FreeTextEntry3] : \par Everardo Gracia MD, FACP, MAXWELL, AAHIVM\par Infectious Diseases\par [facility]

## 2022-07-05 NOTE — ASSESSMENT
[Treatment Education] : treatment education [Treatment Adherence] : treatment adherence [Rx Dose / Side Effects] : Rx dose/side effects [Medical Care Issues] : medical care issues [FreeTextEntry1] : Mtb\par tolerating TB therapy\par weight loss and anemia noted\par post covid\par history CAD, hlc, +tobacco, emphysema, esophageal reflux\par sputum has grown rare nonsporulating mold from 5/17/22 unable to be characterized likely colonizing agent\par \par BEGAN  RIPE 5/11/22 -->\par Ethambutol stopped 6/21/22 after Mtb found to be pansusceptible\par Patient instructed to complete PZA on 7/11/22\par \par labs today - Sputum for AFB \par I anticipate 6 month course with INH/RIF through 11/11/22

## 2022-07-05 NOTE — HISTORY OF PRESENT ILLNESS
[FreeTextEntry1] : OK, I feel fine\par played golf.\par notes decreased appetite, lost 7# since 6/3/22  current weight in office: 180#  BMI = 25.85\par takes meds regularly, On weekdays, participates in Directly Observed Therapy with [facility] by Maicol\par \par He notes chronic cough - productive every morning and most evenings\par No hemoptysis\par no fever, night sweats\par \par noted losing appetite and taste for coffee aorund 6/23/22\par urgent labs done on 6/24/22 were reassuring - anemia was noted: WBC = 6.72  H/H = 11.9/37; plt = 299k; CMP WNL  AST/ALT = 27/41\par \par Sputum AFB:\par 5/5/22 growing AFB identified as Mtb susc to RIF  - Mtb SUSCEPTIBLE TO ALL TESTED\par 5/12 rare afb on smear\par 5/13 neg smear - grew Mtb\par 5/16 Neg smear - no growth\par 5/17 Neg smear - grew Mtb\par 5/18 Neg smear  no growth\par \par Hospitalized [facility] 5/11 --> 5/13/22\par 66M with coronary artery disease, acute inferior wall MI s/p KEVIN to RCA (2007), hypertension, hyperlipidemia, tobacco use, emphysema depression, and esophageal reflux, admitted 5/11/22 with 5/5/22 sputum showing RARE AFB and 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 [neg gene for RIF resistance]\par He is COVID+\par He was well apart from intermittent cough. Risk factors for severe covid include emphysema, HTN, CAD, age and gender, but he is vaccinated with MODERNA x4 - second booster on 3/20/22 and current omicron strain has been associated with much less virulence in vaccinated recipients. \par Mtb treatment is priority. \par \par CHEST CT 5/11/22: AIRWAYS/LUNGS/PLEURA: Right upper lobe tree-in-bud opacities are new \par since 12/14/2017.\par Interval increase in lower lobe and lingular reticular opacities.\par Biapical pleural opacity with calcifications and bilateral calcified \par nodules are unchanged. In addition, there are pulmonary nodules which are \par unchanged. The largest nodule measures 6 mm in the right lower lobe \par (series 3 image 111).\par The bilateral peripheral areas of cystic change are unchanged since 2017 \par and are best shown in the bilateral upper lobes.\par No pleural effusion. Patent central airways.\par \par BEGAN  RIPE 5/11/22 -->\par Ethambutol stopped 6/21/22 after Mtb found to be pansusceptible\par \par \par

## 2022-07-05 NOTE — PHYSICAL EXAM
[General Appearance - Alert] : alert [General Appearance - In No Acute Distress] : in no acute distress [General Appearance - Well-Appearing] : healthy appearing [Sclera] : the sclera and conjunctiva were normal [PERRL With Normal Accommodation] : pupils were equal in size, round, reactive to light [Extraocular Movements] : extraocular movements were intact [Outer Ear] : the ears and nose were normal in appearance [Oropharynx] : the oropharynx was normal with no thrush [Neck Appearance] : the appearance of the neck was normal [Neck Cervical Mass (___cm)] : no neck mass was observed [Jugular Venous Distention Increased] : there was no jugular-venous distention [Thyroid Diffuse Enlargement] : the thyroid was not enlarged [Auscultation Breath Sounds / Voice Sounds] : lungs were clear to auscultation bilaterally [Heart Rate And Rhythm] : heart rate was normal and rhythm regular [Heart Sounds] : normal S1 and S2 [Heart Sounds Gallop] : no gallops [Murmurs] : no murmurs [Heart Sounds Pericardial Friction Rub] : no pericardial rub [Edema] : there was no peripheral edema [Bowel Sounds] : normal bowel sounds [Abdomen Soft] : soft [Abdomen Tenderness] : non-tender [Abdomen Mass (___ Cm)] : no abdominal mass palpated [Costovertebral Angle Tenderness] : no CVA tenderness [No Palpable Adenopathy] : no palpable adenopathy [Musculoskeletal - Swelling] : no joint swelling [Nail Clubbing] : no clubbing  or cyanosis of the fingernails [Motor Tone] : muscle strength and tone were normal [Skin Color & Pigmentation] : normal skin color and pigmentation [] : no rash [Oriented To Time, Place, And Person] : oriented to person, place, and time [Affect] : the affect was normal [FreeTextEntry1] : no wheeze/rhonchi  sl decreased amplitude

## 2022-07-06 LAB
ACID FAST STN SPT: NORMAL
ALBUMIN SERPL ELPH-MCNC: 4.2 G/DL
ALP BLD-CCNC: 84 U/L
ALT SERPL-CCNC: 130 U/L
ANION GAP SERPL CALC-SCNC: 13 MMOL/L
AST SERPL-CCNC: 80 U/L
BASOPHILS # BLD AUTO: 0.04 K/UL
BASOPHILS NFR BLD AUTO: 0.6 %
BILIRUB SERPL-MCNC: 0.7 MG/DL
BUN SERPL-MCNC: 15 MG/DL
CALCIUM SERPL-MCNC: 9.4 MG/DL
CHLORIDE SERPL-SCNC: 105 MMOL/L
CHOLEST SERPL-MCNC: 142 MG/DL
CO2 SERPL-SCNC: 22 MMOL/L
CREAT SERPL-MCNC: 0.86 MG/DL
EGFR: 96 ML/MIN/1.73M2
EOSINOPHIL # BLD AUTO: 0.25 K/UL
EOSINOPHIL NFR BLD AUTO: 3.6 %
GLUCOSE SERPL-MCNC: 92 MG/DL
HCT VFR BLD CALC: 36.9 %
HDLC SERPL-MCNC: 41 MG/DL
HGB BLD-MCNC: 11.7 G/DL
IMM GRANULOCYTES NFR BLD AUTO: 0.7 %
LDLC SERPL CALC-MCNC: 81 MG/DL
LYMPHOCYTES # BLD AUTO: 1.28 K/UL
LYMPHOCYTES NFR BLD AUTO: 18.3 %
MAN DIFF?: NORMAL
MCHC RBC-ENTMCNC: 30.4 PG
MCHC RBC-ENTMCNC: 31.7 GM/DL
MCV RBC AUTO: 95.8 FL
MONOCYTES # BLD AUTO: 0.92 K/UL
MONOCYTES NFR BLD AUTO: 13.1 %
NEUTROPHILS # BLD AUTO: 4.46 K/UL
NEUTROPHILS NFR BLD AUTO: 63.7 %
NONHDLC SERPL-MCNC: 101 MG/DL
PLATELET # BLD AUTO: 311 K/UL
POTASSIUM SERPL-SCNC: 4.8 MMOL/L
PROT SERPL-MCNC: 7 G/DL
PSA SERPL-MCNC: 0.2 NG/ML
RBC # BLD: 3.85 M/UL
RBC # FLD: 15.1 %
SODIUM SERPL-SCNC: 140 MMOL/L
TRIGL SERPL-MCNC: 98 MG/DL
WBC # FLD AUTO: 7 K/UL

## 2022-07-06 RX ORDER — PYRAZINAMIDE 500 MG/1
500 TABLET ORAL DAILY
Qty: 90 | Refills: 5 | Status: DISCONTINUED | COMMUNITY
Start: 2022-06-03 | End: 2022-07-06

## 2022-07-06 RX ORDER — ETHAMBUTOL HYDROCHLORIDE 400 MG/1
400 TABLET ORAL
Qty: 90 | Refills: 5 | Status: DISCONTINUED | COMMUNITY
Start: 2022-06-03 | End: 2022-07-06

## 2022-07-07 ENCOUNTER — NON-APPOINTMENT (OUTPATIENT)
Age: 67
End: 2022-07-07

## 2022-07-07 LAB — ACID FAST STN SPT: ABNORMAL

## 2022-07-08 ENCOUNTER — NON-APPOINTMENT (OUTPATIENT)
Age: 67
End: 2022-07-08

## 2022-07-13 ENCOUNTER — NON-APPOINTMENT (OUTPATIENT)
Age: 67
End: 2022-07-13

## 2022-07-14 LAB
ALBUMIN SERPL ELPH-MCNC: 4.1 G/DL
ALP BLD-CCNC: 80 U/L
ALT SERPL-CCNC: 159 U/L
ANION GAP SERPL CALC-SCNC: 13 MMOL/L
AST SERPL-CCNC: 81 U/L
BILIRUB SERPL-MCNC: 0.8 MG/DL
BUN SERPL-MCNC: 14 MG/DL
CALCIUM SERPL-MCNC: 9.3 MG/DL
CHLORIDE SERPL-SCNC: 105 MMOL/L
CO2 SERPL-SCNC: 23 MMOL/L
CREAT SERPL-MCNC: 0.76 MG/DL
EGFR: 100 ML/MIN/1.73M2
GLUCOSE SERPL-MCNC: 95 MG/DL
POTASSIUM SERPL-SCNC: 4.5 MMOL/L
PROT SERPL-MCNC: 6.8 G/DL
SODIUM SERPL-SCNC: 141 MMOL/L

## 2022-07-14 RX ORDER — ISONIAZID 300 MG/1
300 TABLET ORAL
Qty: 30 | Refills: 5 | Status: DISCONTINUED | COMMUNITY
Start: 2022-06-03 | End: 2022-07-14

## 2022-07-15 ENCOUNTER — NON-APPOINTMENT (OUTPATIENT)
Age: 67
End: 2022-07-15

## 2022-07-19 LAB
ALBUMIN SERPL ELPH-MCNC: 4 G/DL
ALP BLD-CCNC: 82 U/L
ALT SERPL-CCNC: 130 U/L
ANION GAP SERPL CALC-SCNC: 10 MMOL/L
AST SERPL-CCNC: 62 U/L
BILIRUB SERPL-MCNC: 0.5 MG/DL
BUN SERPL-MCNC: 15 MG/DL
CALCIUM SERPL-MCNC: 9.2 MG/DL
CHLORIDE SERPL-SCNC: 105 MMOL/L
CO2 SERPL-SCNC: 24 MMOL/L
CREAT SERPL-MCNC: 0.88 MG/DL
EGFR: 95 ML/MIN/1.73M2
GLUCOSE SERPL-MCNC: 126 MG/DL
POTASSIUM SERPL-SCNC: 4.9 MMOL/L
PROT SERPL-MCNC: 6.6 G/DL
SODIUM SERPL-SCNC: 140 MMOL/L

## 2022-07-20 ENCOUNTER — APPOINTMENT (OUTPATIENT)
Dept: INFECTIOUS DISEASE | Facility: CLINIC | Age: 67
End: 2022-07-20

## 2022-07-20 VITALS
TEMPERATURE: 97.6 F | HEART RATE: 77 BPM | DIASTOLIC BLOOD PRESSURE: 63 MMHG | OXYGEN SATURATION: 96 % | SYSTOLIC BLOOD PRESSURE: 98 MMHG | WEIGHT: 182 LBS | BODY MASS INDEX: 26.05 KG/M2 | HEIGHT: 70 IN

## 2022-07-20 PROCEDURE — 99214 OFFICE O/P EST MOD 30 MIN: CPT

## 2022-07-20 NOTE — PHYSICAL EXAM
[General Appearance - Alert] : alert [General Appearance - In No Acute Distress] : in no acute distress [Sclera] : the sclera and conjunctiva were normal [PERRL With Normal Accommodation] : pupils were equal in size, round, reactive to light [Extraocular Movements] : extraocular movements were intact [Outer Ear] : the ears and nose were normal in appearance [Oropharynx] : the oropharynx was normal with no thrush [Neck Appearance] : the appearance of the neck was normal [Neck Cervical Mass (___cm)] : no neck mass was observed [Jugular Venous Distention Increased] : there was no jugular-venous distention [Thyroid Diffuse Enlargement] : the thyroid was not enlarged [Auscultation Breath Sounds / Voice Sounds] : lungs were clear to auscultation bilaterally [Heart Rate And Rhythm] : heart rate was normal and rhythm regular [Heart Sounds] : normal S1 and S2 [Heart Sounds Gallop] : no gallops [Murmurs] : no murmurs [Heart Sounds Pericardial Friction Rub] : no pericardial rub [Edema] : there was no peripheral edema [Bowel Sounds] : normal bowel sounds [Abdomen Soft] : soft [Abdomen Tenderness] : non-tender [Abdomen Mass (___ Cm)] : no abdominal mass palpated [Costovertebral Angle Tenderness] : no CVA tenderness [No Palpable Adenopathy] : no palpable adenopathy [Musculoskeletal - Swelling] : no joint swelling [Nail Clubbing] : no clubbing  or cyanosis of the fingernails [Motor Tone] : muscle strength and tone were normal [] : no rash [Skin Lesions] : no skin lesions [FreeTextEntry1] : tan [No Focal Deficits] : no focal deficits

## 2022-07-20 NOTE — HISTORY OF PRESENT ILLNESS
[FreeTextEntry1] : Mr Silva's TB treatment has been complicated by transaminitis, with anorexia and weight loss\par \par Not so great today-\par -not eating, drinking fluids\par continues to lose weight\par had good day and ate well on 7/15 but not so much since\par Weight in office with clothes = 182 #  up 2# since 7/5/22\par \par He notes much reduced cough, No fevers.\par \par RIPE 5/11/22-->\par  EMB stopped 6/21 when isolate noted to be pansusceptible\par 6/23 AST/ALT = 27/41 TB/alk phos = 1.0/83 nl LFTs noted at onset of anorexia\par 7/5 AST/ALT = 80/130 TB/alk phos = 0.7/84 PZA stopped 7/7 \par 7/13: AST/ALT = 81/159 TB/alk phos = 0.8/80  INH/Simavastatin stopped 7/16; EMB resumed 7/17; Moxifloxacin harry on 7/18\par 7/18/22 AST/ALT = 62/130 TB/Alk phos = 0.5/82\par \par Rif often causes cholestasis, Increased LFTs off PZA - INH most likely cause of elevated LFTs\par conferred with TB expert who suggested several options\par \par Currently on  RIF/ EMB/ Moxifloxacin\par \par If tolerated will give 9 month course of RIF, EMB, Moxifloxacin (-->2/11/23)\par \par Sputum AFB:\par 5/5/22 growing AFB identified as Mtb susc to RIF - Mtb SUSCEPTIBLE TO ALL TESTED\par 5/12 rare afb on smear\par 5/13 neg smear - grew Mtb\par 5/16 Neg smear - no growth\par 5/17 Neg smear - grew Mtb\par 5/18 Neg smear no growth\par 7/6: Neg smear no growth\par

## 2022-07-20 NOTE — CONSULT LETTER
[Dear  ___] : Dear  [unfilled], [Consult Letter:] : I had the pleasure of evaluating your patient, [unfilled]. [Please see my note below.] : Please see my note below. [Consult Closing:] : Thank you very much for allowing me to participate in the care of this patient.  If you have any questions, please do not hesitate to contact me. [Sincerely,] : Sincerely, [FreeTextEntry2] : Dr Peyman Tellez\par [facility]\par [address]\[place] [address]  [FreeTextEntry3] : Everardo Gracia MD, FACP, MAXWELL, AAHIVM\par Infectious Diseases\par [facility] \par  [DrRemy  ___] : Dr. SLAUGHTER [DrRemy ___] : Dr. SLAUGHTER

## 2022-07-20 NOTE — ASSESSMENT
[FreeTextEntry1] : Mtb\par decreased cough and improved chest exam\par \par Symptomatic transaminitis, with anorexia, fatigue and weight loss\par I suspect INH releated toxicity\par \par I wish to follow LFTs closely about weekly - if normalizes, then will suggest resuming statin (low dose high potency statin Rosuvastatin preferred)\par \par If treatment tolerated will give 9 month course of RIF, EMB, Moxifloxacin (-->2/11/23) and request Ophthalmology eval in about 2 months\par \par labs today [Treatment Education] : treatment education [Medical Care Issues] : medical care issues

## 2022-07-20 NOTE — REVIEW OF SYSTEMS
[Difficulty Sleeping] : difficulty sleeping [Feeling Tired] : feeling tired [Normal Appetite] : appetite not normal  [Recent Weight Loss (___ Lbs)] : recent [unfilled] ~Ulb weight loss [Negative] : Heme/Lymph

## 2022-07-21 ENCOUNTER — NON-APPOINTMENT (OUTPATIENT)
Age: 67
End: 2022-07-21

## 2022-07-21 LAB
ALBUMIN SERPL ELPH-MCNC: 4.2 G/DL
ALP BLD-CCNC: 78 U/L
ALT SERPL-CCNC: 114 U/L
ANION GAP SERPL CALC-SCNC: 12 MMOL/L
AST SERPL-CCNC: 50 U/L
BASOPHILS # BLD AUTO: 0.04 K/UL
BASOPHILS NFR BLD AUTO: 0.6 %
BILIRUB SERPL-MCNC: 1 MG/DL
BUN SERPL-MCNC: 17 MG/DL
CALCIUM SERPL-MCNC: 9.5 MG/DL
CHLORIDE SERPL-SCNC: 105 MMOL/L
CO2 SERPL-SCNC: 23 MMOL/L
CORTIS SERPL-MCNC: 6 UG/DL
CREAT SERPL-MCNC: 0.97 MG/DL
EGFR: 87 ML/MIN/1.73M2
EOSINOPHIL # BLD AUTO: 0.1 K/UL
EOSINOPHIL NFR BLD AUTO: 1.5 %
GLUCOSE SERPL-MCNC: 86 MG/DL
HCT VFR BLD CALC: 36.7 %
HGB BLD-MCNC: 12.1 G/DL
IMM GRANULOCYTES NFR BLD AUTO: 1.2 %
LYMPHOCYTES # BLD AUTO: 1.29 K/UL
LYMPHOCYTES NFR BLD AUTO: 19.9 %
MAN DIFF?: NORMAL
MCHC RBC-ENTMCNC: 31 PG
MCHC RBC-ENTMCNC: 33 GM/DL
MCV RBC AUTO: 94.1 FL
MONOCYTES # BLD AUTO: 0.7 K/UL
MONOCYTES NFR BLD AUTO: 10.8 %
NEUTROPHILS # BLD AUTO: 4.27 K/UL
NEUTROPHILS NFR BLD AUTO: 66 %
PLATELET # BLD AUTO: 327 K/UL
POTASSIUM SERPL-SCNC: 5.1 MMOL/L
PROT SERPL-MCNC: 6.9 G/DL
RBC # BLD: 3.9 M/UL
RBC # FLD: 15.8 %
SODIUM SERPL-SCNC: 140 MMOL/L
WBC # FLD AUTO: 6.48 K/UL

## 2022-07-25 ENCOUNTER — NON-APPOINTMENT (OUTPATIENT)
Age: 67
End: 2022-07-25

## 2022-07-26 LAB
ALBUMIN SERPL ELPH-MCNC: 4.1 G/DL
ALP BLD-CCNC: 78 U/L
ALT SERPL-CCNC: 70 U/L
ANION GAP SERPL CALC-SCNC: 13 MMOL/L
AST SERPL-CCNC: 36 U/L
BILIRUB SERPL-MCNC: 0.5 MG/DL
BUN SERPL-MCNC: 15 MG/DL
CALCIUM SERPL-MCNC: 9.2 MG/DL
CHLORIDE SERPL-SCNC: 104 MMOL/L
CO2 SERPL-SCNC: 22 MMOL/L
CREAT SERPL-MCNC: 0.77 MG/DL
EGFR: 99 ML/MIN/1.73M2
GLUCOSE SERPL-MCNC: 107 MG/DL
POTASSIUM SERPL-SCNC: 4.8 MMOL/L
PROT SERPL-MCNC: 6.8 G/DL
SODIUM SERPL-SCNC: 140 MMOL/L

## 2022-07-28 ENCOUNTER — NON-APPOINTMENT (OUTPATIENT)
Age: 67
End: 2022-07-28

## 2022-08-05 LAB
CULTURE RESULTS: SIGNIFICANT CHANGE UP
SPECIMEN SOURCE: SIGNIFICANT CHANGE UP

## 2022-08-10 ENCOUNTER — NON-APPOINTMENT (OUTPATIENT)
Age: 67
End: 2022-08-10

## 2022-08-10 ENCOUNTER — APPOINTMENT (OUTPATIENT)
Dept: INFECTIOUS DISEASE | Facility: CLINIC | Age: 67
End: 2022-08-10

## 2022-08-10 VITALS
WEIGHT: 169 LBS | RESPIRATION RATE: 16 BRPM | DIASTOLIC BLOOD PRESSURE: 77 MMHG | TEMPERATURE: 98.6 F | SYSTOLIC BLOOD PRESSURE: 125 MMHG | HEIGHT: 70 IN | HEART RATE: 82 BPM | OXYGEN SATURATION: 95 % | BODY MASS INDEX: 24.2 KG/M2

## 2022-08-10 DIAGNOSIS — R74.01 ELEVATION OF LEVELS OF LIVER TRANSAMINASE LEVELS: ICD-10-CM

## 2022-08-10 PROCEDURE — 99213 OFFICE O/P EST LOW 20 MIN: CPT

## 2022-08-10 NOTE — CONSULT LETTER
[Dear  ___] : Dear  [unfilled], [Consult Letter:] : I had the pleasure of evaluating your patient, [unfilled]. [Please see my note below.] : Please see my note below. [Consult Closing:] : Thank you very much for allowing me to participate in the care of this patient.  If you have any questions, please do not hesitate to contact me. [Sincerely,] : Sincerely, [FreeTextEntry2] : Dr Peyman Tellez\par [facility]\par [address]\[place] [address]  [FreeTextEntry3] : Everardo Gracia MD, FACP, MAXWELL, AAHIVM\par Infectious Diseases\par [facility]  [DrRemy  ___] : Dr. SLAUGHTER [DrRemy ___] : Dr. SLAUGHTER

## 2022-08-10 NOTE — ASSESSMENT
[FreeTextEntry1] : being treated for Mtb\par considerable weight loss\par abrupt malaise, nausea, anorexia with upper airway congestion since weekend\par \par ?URI\par most check LFTs\par \par RVP, Sputum AFB, CBC, CMP\par will review results with patient when available - asked to force very small portions of food more frequently. [Nutritional / Food Issues] : nutritional/food issues [Medical Care Issues] : medical care issues

## 2022-08-10 NOTE — HISTORY OF PRESENT ILLNESS
[FreeTextEntry1] : comes for urgent visit\par malaise, not able to eat, cough productive of white phelagm\par nausea\par nasal and throat congecsion\par "I cough - when I cough I cant breath"\par \par off statin - lipids ok when followed up with his cardiologist\par \par was ok last week - worse over weekend\par notes headache and weakness\par \par RIPE 5/11/22-->\par  EMB stopped 6/21 when isolate noted to be pansusceptible\par 6/23 AST/ALT = 27/41 TB/alk phos = 1.0/83 nl LFTs noted at onset of anorexia\par 7/5 AST/ALT = 80/130 TB/alk phos = 0.7/84 PZA stopped 7/7 \par 7/13: AST/ALT = 81/159 TB/alk phos = 0.8/80 INH/Simavastatin stopped 7/16; EMB resumed 7/17; Moxifloxacin harry on 7/18\par 7/18/22 AST/ALT = 62/130 TB/Alk phos = 0.5/82\par 7/20: AST/ALT = 50/114; TBil/alk phos = 1.0/78.\par 7/25/22: AST/ATIYA = 36/70 T bili/alk phso 0.5/78. \par

## 2022-08-10 NOTE — PHYSICAL EXAM
[General Appearance - Alert] : alert [General Appearance - In No Acute Distress] : in no acute distress [Sclera] : the sclera and conjunctiva were normal [PERRL With Normal Accommodation] : pupils were equal in size, round, reactive to light [Extraocular Movements] : extraocular movements were intact [Outer Ear] : the ears and nose were normal in appearance [Oropharynx] : the oropharynx was normal with no thrush [Neck Appearance] : the appearance of the neck was normal [Neck Cervical Mass (___cm)] : no neck mass was observed [Jugular Venous Distention Increased] : there was no jugular-venous distention [Thyroid Diffuse Enlargement] : the thyroid was not enlarged [Auscultation Breath Sounds / Voice Sounds] : lungs were clear to auscultation bilaterally [Heart Sounds] : normal S1 and S2 [Heart Rate And Rhythm] : heart rate was normal and rhythm regular [Heart Sounds Gallop] : no gallops [Murmurs] : no murmurs [Heart Sounds Pericardial Friction Rub] : no pericardial rub [FreeTextEntry1] : trace ankle edema [Bowel Sounds] : normal bowel sounds [Abdomen Soft] : soft [Abdomen Tenderness] : non-tender [Abdomen Mass (___ Cm)] : no abdominal mass palpated [Costovertebral Angle Tenderness] : no CVA tenderness [No Palpable Adenopathy] : no palpable adenopathy [Musculoskeletal - Swelling] : no joint swelling [Nail Clubbing] : no clubbing  or cyanosis of the fingernails [Motor Tone] : muscle strength and tone were normal [Skin Color & Pigmentation] : normal skin color and pigmentation [] : no rash [Oriented To Time, Place, And Person] : oriented to person, place, and time [Affect] : the affect was normal

## 2022-08-11 ENCOUNTER — NON-APPOINTMENT (OUTPATIENT)
Age: 67
End: 2022-08-11

## 2022-08-11 LAB
ALBUMIN SERPL ELPH-MCNC: 4.2 G/DL
ALP BLD-CCNC: 89 U/L
ALT SERPL-CCNC: 27 U/L
ANION GAP SERPL CALC-SCNC: 12 MMOL/L
AST SERPL-CCNC: 19 U/L
BASOPHILS # BLD AUTO: 0.03 K/UL
BASOPHILS NFR BLD AUTO: 0.3 %
BILIRUB SERPL-MCNC: 1.5 MG/DL
BUN SERPL-MCNC: 13 MG/DL
CALCIUM SERPL-MCNC: 9.2 MG/DL
CHLORIDE SERPL-SCNC: 104 MMOL/L
CO2 SERPL-SCNC: 23 MMOL/L
CREAT SERPL-MCNC: 0.87 MG/DL
EGFR: 96 ML/MIN/1.73M2
EOSINOPHIL # BLD AUTO: 0.62 K/UL
EOSINOPHIL NFR BLD AUTO: 6.1 %
GLUCOSE SERPL-MCNC: 104 MG/DL
HCT VFR BLD CALC: 38.8 %
HGB BLD-MCNC: 12.4 G/DL
IMM GRANULOCYTES NFR BLD AUTO: 0.9 %
LYMPHOCYTES # BLD AUTO: 1.12 K/UL
LYMPHOCYTES NFR BLD AUTO: 11.1 %
MAN DIFF?: NORMAL
MCHC RBC-ENTMCNC: 30 PG
MCHC RBC-ENTMCNC: 32 GM/DL
MCV RBC AUTO: 93.9 FL
MONOCYTES # BLD AUTO: 1.01 K/UL
MONOCYTES NFR BLD AUTO: 10 %
NEUTROPHILS # BLD AUTO: 7.25 K/UL
NEUTROPHILS NFR BLD AUTO: 71.6 %
PLATELET # BLD AUTO: 285 K/UL
POTASSIUM SERPL-SCNC: 5 MMOL/L
PROT SERPL-MCNC: 7 G/DL
RAPID RVP RESULT: NOT DETECTED
RBC # BLD: 4.13 M/UL
RBC # FLD: 15.3 %
SARS-COV-2 RNA PNL RESP NAA+PROBE: NOT DETECTED
SODIUM SERPL-SCNC: 139 MMOL/L
WBC # FLD AUTO: 10.12 K/UL

## 2022-08-18 ENCOUNTER — NON-APPOINTMENT (OUTPATIENT)
Age: 67
End: 2022-08-18

## 2022-08-25 LAB — ACID FAST STN SPT: NORMAL

## 2022-08-30 ENCOUNTER — APPOINTMENT (OUTPATIENT)
Dept: INFECTIOUS DISEASE | Facility: CLINIC | Age: 67
End: 2022-08-30

## 2022-08-30 VITALS
BODY MASS INDEX: 24.62 KG/M2 | OXYGEN SATURATION: 98 % | SYSTOLIC BLOOD PRESSURE: 118 MMHG | HEART RATE: 76 BPM | DIASTOLIC BLOOD PRESSURE: 74 MMHG | HEIGHT: 70 IN | WEIGHT: 172 LBS | TEMPERATURE: 97.6 F

## 2022-08-30 PROCEDURE — 99214 OFFICE O/P EST MOD 30 MIN: CPT

## 2022-08-30 RX ORDER — CLOPIDOGREL BISULFATE 75 MG/1
75 TABLET, FILM COATED ORAL
Refills: 0 | Status: ACTIVE | COMMUNITY
Start: 2022-08-30

## 2022-08-30 RX ORDER — PYRIDOXINE HCL (VITAMIN B6) 50 MG
50 TABLET ORAL DAILY
Qty: 30 | Refills: 5 | Status: DISCONTINUED | COMMUNITY
Start: 2022-06-03 | End: 2022-08-30

## 2022-08-30 NOTE — PHYSICAL EXAM
[General Appearance - Alert] : alert [General Appearance - In No Acute Distress] : in no acute distress [Sclera] : the sclera and conjunctiva were normal [PERRL With Normal Accommodation] : pupils were equal in size, round, reactive to light [Extraocular Movements] : extraocular movements were intact [Outer Ear] : the ears and nose were normal in appearance [Oropharynx] : the oropharynx was normal with no thrush [Neck Appearance] : the appearance of the neck was normal [Neck Cervical Mass (___cm)] : no neck mass was observed [Jugular Venous Distention Increased] : there was no jugular-venous distention [Thyroid Diffuse Enlargement] : the thyroid was not enlarged [Auscultation Breath Sounds / Voice Sounds] : lungs were clear to auscultation bilaterally [Heart Rate And Rhythm] : heart rate was normal and rhythm regular [Heart Sounds] : normal S1 and S2 [Heart Sounds Gallop] : no gallops [Murmurs] : no murmurs [Heart Sounds Pericardial Friction Rub] : no pericardial rub [Edema] : there was no peripheral edema [Bowel Sounds] : normal bowel sounds [Abdomen Soft] : soft [Abdomen Tenderness] : non-tender [Abdomen Mass (___ Cm)] : no abdominal mass palpated [Costovertebral Angle Tenderness] : no CVA tenderness [No Palpable Adenopathy] : no palpable adenopathy [Musculoskeletal - Swelling] : no joint swelling [Nail Clubbing] : no clubbing  or cyanosis of the fingernails [Motor Tone] : muscle strength and tone were normal [Skin Color & Pigmentation] : normal skin color and pigmentation [] : no rash [Deep Tendon Reflexes (DTR)] : deep tendon reflexes were 2+ and symmetric [Sensation] : the sensory exam was normal to light touch and pinprick [No Focal Deficits] : no focal deficits [Oriented To Time, Place, And Person] : oriented to person, place, and time [Affect] : the affect was normal

## 2022-08-30 NOTE — CONSULT LETTER
[Dear  ___] : Dear  [unfilled], [Consult Letter:] : I had the pleasure of evaluating your patient, [unfilled]. [Please see my note below.] : Please see my note below. [Consult Closing:] : Thank you very much for allowing me to participate in the care of this patient.  If you have any questions, please do not hesitate to contact me. [Sincerely,] : Sincerely, [FreeTextEntry2] : Dr Peyman Tellez\par [facility]\par [address]\[place] [address]  [FreeTextEntry3] : Everardo Gracia MD, FACP, MAXWELL, AAHIVM\par Infectious Diseases\par [facility]  [DrRemy  ___] : Dr. SLAUGHETR [DrRemy ___] : Dr. SLAUGHTER

## 2022-08-30 NOTE — HISTORY OF PRESENT ILLNESS
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, currently on RIF, EMB and Moxifloxacin\par He notes persistent anorexia - but if he forces himself to eat, he feels better.\par His weight is stable on his scale at home, In office, his current weight = 172#  BMI = 24.68  - an increase of 3# since 8/10/22\par \par His cough has resolved.\par No fever, sweats\par normal bowel movements\par He notes fatigue and light headededness that improves with food.\par \par As previously noted:\par \par Mr Silva was hospitalized [facility] 5/11 --> 5/13/22\par 66M with coronary artery disease, acute inferior wall MI s/p KEVIN to RCA (2007), hypertension, hyperlipidemia, tobacco use, emphysema depression, and esophageal reflux, admitted 5/11/22 with 5/5/22 sputum showing RARE AFB and 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 [neg gene for RIF resistance]\par He is COVID+\par 5/17/22 neg smear grew Mtb  - last positive cx\par 5/18 neg AFB smear and cultures\par 7/6:neg AFB smear and cultures\par 8/10: neg AFB smear and cultures\par \par \par RIPE 5/11/22-->\par  EMB stopped 6/21 when isolate noted to be pansusceptible\par 6/23 AST/ALT = 27/41 TB/alk phos = 1.0/83 nl LFTs noted at onset of anorexia\par 7/5 AST/ALT = 80/130 TB/alk phos = 0.7/84 PZA stopped 7/7 \par 7/13: AST/ALT = 81/159 TB/alk phos = 0.8/80\par 7/14; RIF/ INH/Simavastatin stopped  EMB resumed 7/17; Moxifloxacin begu and RIF resumed  on 7/18\par        Plan: if tolerated 9 month course of RIF,EMB, Moxiflxacin through 2/11/23\par 7/18/22 AST/ALT = 62/130 TB/Alk phos = 0.5/82\par 7/20: AST/ALT = 50/114; TBil/alk phos = 1.0/78.\par 7/25/22: AST/ATIYA = 36/70 T bili/alk phso 0.5/78. \par 8/10/22: WBC = 10/12; 6.1 Eos' AST/ALT= 19/29; TBili/alk phos= 1.5/89; RVP neg. \par

## 2022-08-30 NOTE — ASSESSMENT
[FreeTextEntry1] :    Mr Silva is coping with his anorexia, tolerating small and more frequent food portions and sustaining his weight. He has symptomatic improvement; He notes his cough is completely resolved\par His cardiologist has deemed statin therapy not required at present\par \par Plan: \par labs today\par pyridoxine stopped as he is off INH\par if tolerated 9 month course of RIF,EMB, Moxiflxacin through 2/11/23\par will need Ophthalmology follow up to monitor for Ethambutol associated optic neuritis\par \par  [Nutritional / Food Issues] : nutritional/food issues [Medical Care Issues] : medical care issues

## 2022-08-31 ENCOUNTER — NON-APPOINTMENT (OUTPATIENT)
Age: 67
End: 2022-08-31

## 2022-08-31 LAB
ALBUMIN SERPL ELPH-MCNC: 4.4 G/DL
ALP BLD-CCNC: 76 U/L
ALT SERPL-CCNC: 19 U/L
ANION GAP SERPL CALC-SCNC: 15 MMOL/L
AST SERPL-CCNC: 18 U/L
BASOPHILS # BLD AUTO: 0.05 K/UL
BASOPHILS NFR BLD AUTO: 0.8 %
BILIRUB SERPL-MCNC: 0.7 MG/DL
BUN SERPL-MCNC: 17 MG/DL
CALCIUM SERPL-MCNC: 9.4 MG/DL
CHLORIDE SERPL-SCNC: 106 MMOL/L
CO2 SERPL-SCNC: 22 MMOL/L
CREAT SERPL-MCNC: 0.85 MG/DL
EGFR: 96 ML/MIN/1.73M2
EOSINOPHIL # BLD AUTO: 0.58 K/UL
EOSINOPHIL NFR BLD AUTO: 9.3 %
GLUCOSE SERPL-MCNC: 97 MG/DL
HCT VFR BLD CALC: 36.6 %
HGB BLD-MCNC: 11.5 G/DL
IMM GRANULOCYTES NFR BLD AUTO: 0.5 %
LYMPHOCYTES # BLD AUTO: 1.24 K/UL
LYMPHOCYTES NFR BLD AUTO: 19.8 %
MAN DIFF?: NORMAL
MCHC RBC-ENTMCNC: 30.3 PG
MCHC RBC-ENTMCNC: 31.4 GM/DL
MCV RBC AUTO: 96.6 FL
MONOCYTES # BLD AUTO: 0.73 K/UL
MONOCYTES NFR BLD AUTO: 11.7 %
NEUTROPHILS # BLD AUTO: 3.63 K/UL
NEUTROPHILS NFR BLD AUTO: 57.9 %
PLATELET # BLD AUTO: 291 K/UL
POTASSIUM SERPL-SCNC: 5 MMOL/L
PROT SERPL-MCNC: 6.9 G/DL
RBC # BLD: 3.79 M/UL
RBC # FLD: 15.2 %
SODIUM SERPL-SCNC: 143 MMOL/L
WBC # FLD AUTO: 6.26 K/UL

## 2022-09-01 ENCOUNTER — NON-APPOINTMENT (OUTPATIENT)
Age: 67
End: 2022-09-01

## 2022-09-27 ENCOUNTER — APPOINTMENT (OUTPATIENT)
Dept: INFECTIOUS DISEASE | Facility: CLINIC | Age: 67
End: 2022-09-27

## 2022-09-27 ENCOUNTER — LABORATORY RESULT (OUTPATIENT)
Age: 67
End: 2022-09-27

## 2022-09-27 VITALS
HEART RATE: 82 BPM | BODY MASS INDEX: 24.2 KG/M2 | DIASTOLIC BLOOD PRESSURE: 74 MMHG | WEIGHT: 169 LBS | HEIGHT: 70 IN | TEMPERATURE: 97.8 F | OXYGEN SATURATION: 99 % | SYSTOLIC BLOOD PRESSURE: 120 MMHG

## 2022-09-27 PROCEDURE — 99213 OFFICE O/P EST LOW 20 MIN: CPT

## 2022-09-27 NOTE — REVIEW OF SYSTEMS
[Feeling Tired] : feeling tired [Recent Weight Loss (___ Lbs)] : recent [unfilled] ~Ulb weight loss [Depression] : depression [Negative] : Heme/Lymph [___ # of Missed Doses in The Past Week] : [unfilled] doses missed in the past week

## 2022-09-28 LAB
ALBUMIN SERPL ELPH-MCNC: 4.7 G/DL
ALP BLD-CCNC: 86 U/L
ALT SERPL-CCNC: 16 U/L
ANION GAP SERPL CALC-SCNC: 11 MMOL/L
AST SERPL-CCNC: 15 U/L
BASOPHILS # BLD AUTO: 0.03 K/UL
BASOPHILS NFR BLD AUTO: 0.5 %
BILIRUB SERPL-MCNC: 0.6 MG/DL
BUN SERPL-MCNC: 14 MG/DL
CALCIUM SERPL-MCNC: 9.7 MG/DL
CHLORIDE SERPL-SCNC: 107 MMOL/L
CHOLEST SERPL-MCNC: 203 MG/DL
CO2 SERPL-SCNC: 25 MMOL/L
CREAT SERPL-MCNC: 0.77 MG/DL
EGFR: 99 ML/MIN/1.73M2
EOSINOPHIL # BLD AUTO: 0.26 K/UL
EOSINOPHIL NFR BLD AUTO: 4.7 %
FOLATE SERPL-MCNC: 5.5 NG/ML
GLUCOSE SERPL-MCNC: 98 MG/DL
HCT VFR BLD CALC: 40.3 %
HDLC SERPL-MCNC: 55 MG/DL
HGB BLD-MCNC: 13.2 G/DL
IMM GRANULOCYTES NFR BLD AUTO: 0.7 %
IRON SATN MFR SERPL: 38 %
IRON SERPL-MCNC: 101 UG/DL
LDLC SERPL CALC-MCNC: 127 MG/DL
LYMPHOCYTES # BLD AUTO: 1.36 K/UL
LYMPHOCYTES NFR BLD AUTO: 24.4 %
MAN DIFF?: NORMAL
MCHC RBC-ENTMCNC: 30.8 PG
MCHC RBC-ENTMCNC: 32.8 GM/DL
MCV RBC AUTO: 94.2 FL
MONOCYTES # BLD AUTO: 0.62 K/UL
MONOCYTES NFR BLD AUTO: 11.1 %
NEUTROPHILS # BLD AUTO: 3.27 K/UL
NEUTROPHILS NFR BLD AUTO: 58.6 %
NONHDLC SERPL-MCNC: 149 MG/DL
PLATELET # BLD AUTO: 308 K/UL
POTASSIUM SERPL-SCNC: 5.6 MMOL/L
PROT SERPL-MCNC: 7.2 G/DL
RBC # BLD: 4.28 M/UL
RBC # FLD: 15 %
SODIUM SERPL-SCNC: 143 MMOL/L
TIBC SERPL-MCNC: 262 UG/DL
TRIGL SERPL-MCNC: 110 MG/DL
UIBC SERPL-MCNC: 162 UG/DL
VIT B12 SERPL-MCNC: 588 PG/ML
WBC # FLD AUTO: 5.58 K/UL

## 2022-10-03 LAB — ACID FAST STN SPT: NORMAL

## 2022-10-03 NOTE — CONSULT LETTER
[Dear  ___] : Dear  [unfilled], [Consult Letter:] : I had the pleasure of evaluating your patient, [unfilled]. [Please see my note below.] : Please see my note below. [Consult Closing:] : Thank you very much for allowing me to participate in the care of this patient.  If you have any questions, please do not hesitate to contact me. [Sincerely,] : Sincerely, [DrRemy  ___] : Dr. SLAUGHTER [DrRemy ___] : Dr. SLAUGHTER [FreeTextEntry2] : Dr Peyman Tellez\par [facility]\par [address]\[place] [address]  [FreeTextEntry3] : Everardo Gracia MD, FACP, MAXWELL, AAHIVM\par Infectious Diseases\par [facility]

## 2022-10-03 NOTE — ASSESSMENT
[Medical Care Issues] : medical care issues [FreeTextEntry1] : MTB\par recovered INH hepatitis\par tolerating therapy\par \par most recent labs remarkable for eosinophilia and mild anemia\par \par labs today\par ophthalmology referral to evaluate for Ethambutol associated optic neuritis\par will consider adrenal insufficiency while reviewing today's labs

## 2022-10-03 NOTE — PHYSICAL EXAM
[General Appearance - Alert] : alert [General Appearance - In No Acute Distress] : in no acute distress [Sclera] : the sclera and conjunctiva were normal [PERRL With Normal Accommodation] : pupils were equal in size, round, reactive to light [Extraocular Movements] : extraocular movements were intact [Outer Ear] : the ears and nose were normal in appearance [Oropharynx] : the oropharynx was normal with no thrush [Neck Appearance] : the appearance of the neck was normal [Neck Cervical Mass (___cm)] : no neck mass was observed [Jugular Venous Distention Increased] : there was no jugular-venous distention [Thyroid Diffuse Enlargement] : the thyroid was not enlarged [Auscultation Breath Sounds / Voice Sounds] : lungs were clear to auscultation bilaterally [Heart Rate And Rhythm] : heart rate was normal and rhythm regular [Heart Sounds] : normal S1 and S2 [Heart Sounds Gallop] : no gallops [Murmurs] : no murmurs [Heart Sounds Pericardial Friction Rub] : no pericardial rub [Edema] : there was no peripheral edema [Bowel Sounds] : normal bowel sounds [Abdomen Soft] : soft [Abdomen Tenderness] : non-tender [Abdomen Mass (___ Cm)] : no abdominal mass palpated [Costovertebral Angle Tenderness] : no CVA tenderness [No Palpable Adenopathy] : no palpable adenopathy [Musculoskeletal - Swelling] : no joint swelling [Nail Clubbing] : no clubbing  or cyanosis of the fingernails [Motor Tone] : muscle strength and tone were normal [Skin Color & Pigmentation] : normal skin color and pigmentation [] : no rash [Oriented To Time, Place, And Person] : oriented to person, place, and time [Affect] : the affect was normal [FreeTextEntry1] : appears tan

## 2022-10-03 NOTE — HISTORY OF PRESENT ILLNESS
[FreeTextEntry1] : "Better"\par taking RIF, EMB, Mox regularly (planned to complete 9 month duration of treatment through 2/11/13)\par appetite comes and goes\par no cough, fever, chills sweats\par no gi upset, no diarrhea \par denies nausea  but occasionally "queasy"\par He notes feeling tired. He requires alarm to get up in am - would sleep longer....\par He states he he holding his weight - at home he weighs between 163.5 - 165 - today in offic ehis weight 169# is 3# less than on 8/30/22\par \par notes feeling some depression with the shorter days, depressed about "wasted" spring and summer\par \par He took covid vaccination #5  bivalent booster on about 9/22/22 \par took flu shot at same time 9/22/22\par \par As previously noted:\par \par Mr Silva was hospitalized [facility] 5/11 --> 5/13/22\par 66M with coronary artery disease, acute inferior wall MI s/p KEVIN to RCA (2007), hypertension, hyperlipidemia, tobacco use, emphysema depression, and esophageal reflux, admitted 5/11/22 with 5/5/22 sputum showing RARE AFB and 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 [neg gene for RIF resistance]\par He is COVID+\par 5/17/22 neg smear grew Mtb - last positive cx\par 5/18 neg AFB smear and cultures\par 7/6:neg AFB smear and cultures\par 8/10: neg AFB smear and cultures  [10/33/22 ADD; NEG at 6 weeks]\par \par \par RIPE 5/11/22-->\par  EMB stopped 6/21 when isolate noted to be pansusceptible\par 6/23 AST/ALT = 27/41 TB/alk phos = 1.0/83 nl LFTs noted at onset of anorexia\par 7/5 AST/ALT = 80/130 TB/alk phos = 0.7/84 PZA stopped 7/7 \par 7/13: AST/ALT = 81/159 TB/alk phos = 0.8/80\par 7/14; RIF/ INH/Simavastatin stopped EMB resumed 7/17; Moxifloxacin begu and RIF resumed on 7/18\par  Plan: if tolerated 9 month course of RIF,EMB, Moxiflxacin through 2/11/23\par 7/18/22 AST/ALT = 62/130 TB/Alk phos = 0.5/82\par 7/20: AST/ALT = 50/114; TBil/alk phos = 1.0/78.\par 7/25/22: AST/ATIYA = 36/70 T bili/alk phso 0.5/78. \par 8/10/22: WBC = 10/12; 6.1 Eos' AST/ALT= 19/29; TBili/alk phos= 1.5/89; RVP neg; Sputum AFB NEG to date\par 8/30//22 H/H = 11.5/36.6  9.3% Eos; Creat = 0.85; TBili/alk phos= 0.7/76; AST/ALT= 18/19;\par 9/27/22:  WBC= 5.32  4.7% Eos; H/H = 13.2/40.3; Creat = 0.77;  TBili/alk phos= 0.6/86; AST/ALT= 15/16;

## 2022-10-12 ENCOUNTER — NON-APPOINTMENT (OUTPATIENT)
Age: 67
End: 2022-10-12

## 2022-10-12 ENCOUNTER — APPOINTMENT (OUTPATIENT)
Dept: OPHTHALMOLOGY | Facility: CLINIC | Age: 67
End: 2022-10-12

## 2022-10-12 PROCEDURE — 92004 COMPRE OPH EXAM NEW PT 1/>: CPT

## 2022-11-01 ENCOUNTER — APPOINTMENT (OUTPATIENT)
Dept: INFECTIOUS DISEASE | Facility: CLINIC | Age: 67
End: 2022-11-01

## 2022-11-01 VITALS
HEART RATE: 76 BPM | TEMPERATURE: 97.5 F | WEIGHT: 171 LBS | SYSTOLIC BLOOD PRESSURE: 125 MMHG | DIASTOLIC BLOOD PRESSURE: 76 MMHG | HEIGHT: 70 IN | OXYGEN SATURATION: 96 % | BODY MASS INDEX: 24.48 KG/M2

## 2022-11-01 PROCEDURE — 99213 OFFICE O/P EST LOW 20 MIN: CPT

## 2022-11-02 LAB
ALBUMIN SERPL ELPH-MCNC: 4.2 G/DL
ALP BLD-CCNC: 74 U/L
ALT SERPL-CCNC: 12 U/L
ANION GAP SERPL CALC-SCNC: 11 MMOL/L
AST SERPL-CCNC: 15 U/L
BASOPHILS # BLD AUTO: 0.03 K/UL
BASOPHILS NFR BLD AUTO: 0.5 %
BILIRUB SERPL-MCNC: 0.6 MG/DL
BUN SERPL-MCNC: 16 MG/DL
CALCIUM SERPL-MCNC: 9.4 MG/DL
CHLORIDE SERPL-SCNC: 105 MMOL/L
CO2 SERPL-SCNC: 24 MMOL/L
CORTIS SERPL-MCNC: 11.9 UG/DL
CREAT SERPL-MCNC: 0.81 MG/DL
EGFR: 97 ML/MIN/1.73M2
EOSINOPHIL # BLD AUTO: 0.15 K/UL
EOSINOPHIL NFR BLD AUTO: 2.3 %
GLUCOSE SERPL-MCNC: 98 MG/DL
HCT VFR BLD CALC: 37.8 %
HGB BLD-MCNC: 12.3 G/DL
IMM GRANULOCYTES NFR BLD AUTO: 0.9 %
LYMPHOCYTES # BLD AUTO: 1.17 K/UL
LYMPHOCYTES NFR BLD AUTO: 17.8 %
MAN DIFF?: NORMAL
MCHC RBC-ENTMCNC: 30.8 PG
MCHC RBC-ENTMCNC: 32.5 GM/DL
MCV RBC AUTO: 94.7 FL
MONOCYTES # BLD AUTO: 0.7 K/UL
MONOCYTES NFR BLD AUTO: 10.6 %
NEUTROPHILS # BLD AUTO: 4.48 K/UL
NEUTROPHILS NFR BLD AUTO: 67.9 %
PLATELET # BLD AUTO: 303 K/UL
POTASSIUM SERPL-SCNC: 4.7 MMOL/L
PROT SERPL-MCNC: 6.9 G/DL
RBC # BLD: 3.99 M/UL
RBC # FLD: 14.9 %
SODIUM SERPL-SCNC: 140 MMOL/L
WBC # FLD AUTO: 6.59 K/UL

## 2022-11-17 NOTE — CONSULT LETTER
[Dear  ___] : Dear  [unfilled], [Consult Letter:] : I had the pleasure of evaluating your patient, [unfilled]. [Please see my note below.] : Please see my note below. [Consult Closing:] : Thank you very much for allowing me to participate in the care of this patient.  If you have any questions, please do not hesitate to contact me. [Sincerely,] : Sincerely, [DrRemy  ___] : Dr. SLAUGHTER [DrRemy ___] : Dr. SLAUGHTER [FreeTextEntry2] : Dr Peyman Tellez\par [facility]\par [address]\[place] [address] [FreeTextEntry3] : Everardo Gracia MD, FACP, MAXWELL, AAHIVM\par Infectious Diseases\par [facility]

## 2022-11-17 NOTE — ASSESSMENT
[Medical Care Issues] : medical care issues [FreeTextEntry1] : tolerating tb therapy\par To complete 9 month course with RIF/EMB/Mox on 2/11/23\par \par no objection to addition of statin if needed

## 2022-11-17 NOTE — HISTORY OF PRESENT ILLNESS
[FreeTextEntry1] : doing ok\par taking the meds are tedious\par eating more\par has 2 meals per day\par gained 2# wince last visit 9/27/22 - current weight (in office) 171#  BMI = 24.54\par saw Ophthalmology 10/12/22 - no signs of ETM optic neuritis\par \par He continues RIF/EMB/MOX  - planned to complet on 2/11/23\par skypes for remote Directly Observed Therapy with [facility] on almost all weekdays\par last + sputum for MTb was 5/17/22 - had negative cultures 5/18. 7/6. 81/0 and 9/27/22\par \par had flu and covid booster mid October 2022\par \par Mr Silva will be seeing his cardiologist\par Statin was discontinued 7/14/22\par 9/27/22 Cholesterol  Total/HDL/LDL =  203/55/127\par \par \par As previously noted:\par \par Mr Silva was hospitalized [facility] 5/11 --> 5/13/22\par 66M with coronary artery disease, acute inferior wall MI s/p KEVIN to RCA (2007), hypertension, hyperlipidemia, tobacco use, emphysema depression, and esophageal reflux, admitted 5/11/22 with 5/5/22 sputum showing RARE AFB and 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 [neg gene for RIF resistance]\par He was COVID+\par 5/17/22 neg smear grew Mtb - last positive cx\par 5/18 neg AFB smear and cultures\par 7/6:neg AFB smear and cultures\par 8/10: neg AFB smear and cultures [10/33/22 ADD; NEG at 6 weeks]\par 9/27/22 Sputum AFB neg at 6 weeks  {Add 11/7/22]\par \par \par RIPE 5/11/22-->\par  EMB stopped 6/21 when isolate noted to be pansusceptible\par 6/23 AST/ALT = 27/41 TB/alk phos = 1.0/83 nl LFTs noted at onset of anorexia\par 7/5 AST/ALT = 80/130 TB/alk phos = 0.7/84 PZA stopped 7/7 \par 7/13: AST/ALT = 81/159 TB/alk phos = 0.8/80\par 7/14; RIF/ INH/Simavastatin stopped EMB resumed 7/17; Moxifloxacin begu and RIF resumed on 7/18\par  Plan: if tolerated 9 month course of RIF,EMB, Moxiflxacin through 2/11/23\par 7/18/22 AST/ALT = 62/130 TB/Alk phos = 0.5/82\par 7/20: AST/ALT = 50/114; TBil/alk phos = 1.0/78.\par 7/25/22: AST/ATIYA = 36/70 T bili/alk phso 0.5/78. \par 8/10/22: WBC = 10/12; 6.1 Eos' AST/ALT= 19/29; TBili/alk phos= 1.5/89; RVP neg; Sputum AFB NEG to date\par 8/30//22 H/H = 11.5/36.6 9.3% Eos; Creat = 0.85; TBili/alk phos= 0.7/76; AST/ALT= 18/19;\par 9/27/22: WBC= 5.32 4.7% Eos; H/H = 13.2/40.3; Creat = 0.77; TBili/alk phos= 0.6/86; AST/ALT= 15/16;\par 11/1/22: WBC= 6.59  2.3% Eos; H/H = 13.2/40.3; Creat = 0.81; TBili/alk phos= 0.6/74; AST/ALT= 15/12;  cortisol = 11.9\par  \par \par

## 2022-12-05 ENCOUNTER — RX RENEWAL (OUTPATIENT)
Age: 67
End: 2022-12-05

## 2022-12-06 ENCOUNTER — APPOINTMENT (OUTPATIENT)
Dept: INFECTIOUS DISEASE | Facility: CLINIC | Age: 67
End: 2022-12-06

## 2022-12-06 VITALS
BODY MASS INDEX: 25.05 KG/M2 | TEMPERATURE: 97.6 F | HEART RATE: 93 BPM | SYSTOLIC BLOOD PRESSURE: 120 MMHG | OXYGEN SATURATION: 95 % | WEIGHT: 175 LBS | HEIGHT: 70 IN | DIASTOLIC BLOOD PRESSURE: 71 MMHG

## 2022-12-06 PROCEDURE — 99213 OFFICE O/P EST LOW 20 MIN: CPT

## 2022-12-06 NOTE — PHYSICAL EXAM
[General Appearance - Alert] : alert [General Appearance - In No Acute Distress] : in no acute distress [Sclera] : the sclera and conjunctiva were normal [PERRL With Normal Accommodation] : pupils were equal in size, round, reactive to light [Extraocular Movements] : extraocular movements were intact [Outer Ear] : the ears and nose were normal in appearance [Oropharynx] : the oropharynx was normal with no thrush [Neck Appearance] : the appearance of the neck was normal [Neck Cervical Mass (___cm)] : no neck mass was observed [Jugular Venous Distention Increased] : there was no jugular-venous distention [Auscultation Breath Sounds / Voice Sounds] : lungs were clear to auscultation bilaterally [Heart Rate And Rhythm] : heart rate was normal and rhythm regular [Heart Sounds] : normal S1 and S2 [Heart Sounds Gallop] : no gallops [Murmurs] : no murmurs [Heart Sounds Pericardial Friction Rub] : no pericardial rub [Bowel Sounds] : normal bowel sounds [Abdomen Soft] : soft [Abdomen Tenderness] : non-tender [Abdomen Mass (___ Cm)] : no abdominal mass palpated [Costovertebral Angle Tenderness] : no CVA tenderness [Musculoskeletal - Swelling] : no joint swelling [Nail Clubbing] : no clubbing  or cyanosis of the fingernails [Motor Tone] : muscle strength and tone were normal [Skin Color & Pigmentation] : normal skin color and pigmentation [] : no rash [No Focal Deficits] : no focal deficits [Affect] : the affect was normal

## 2022-12-07 LAB
ALBUMIN SERPL ELPH-MCNC: 4.2 G/DL
ALP BLD-CCNC: 80 U/L
ALT SERPL-CCNC: 14 U/L
ANION GAP SERPL CALC-SCNC: 9 MMOL/L
AST SERPL-CCNC: 16 U/L
BASOPHILS # BLD AUTO: 0.03 K/UL
BASOPHILS NFR BLD AUTO: 0.4 %
BILIRUB SERPL-MCNC: 0.6 MG/DL
BUN SERPL-MCNC: 11 MG/DL
CALCIUM SERPL-MCNC: 9.7 MG/DL
CHLORIDE SERPL-SCNC: 104 MMOL/L
CHOLEST SERPL-MCNC: 179 MG/DL
CO2 SERPL-SCNC: 25 MMOL/L
CREAT SERPL-MCNC: 0.79 MG/DL
EGFR: 98 ML/MIN/1.73M2
EOSINOPHIL # BLD AUTO: 0.23 K/UL
EOSINOPHIL NFR BLD AUTO: 3.1 %
GLUCOSE SERPL-MCNC: 132 MG/DL
HCT VFR BLD CALC: 35.8 %
HDLC SERPL-MCNC: 52 MG/DL
HGB BLD-MCNC: 11.5 G/DL
IMM GRANULOCYTES NFR BLD AUTO: 0.8 %
LDLC SERPL CALC-MCNC: 106 MG/DL
LYMPHOCYTES # BLD AUTO: 1.02 K/UL
LYMPHOCYTES NFR BLD AUTO: 13.7 %
MAN DIFF?: NORMAL
MCHC RBC-ENTMCNC: 30.8 PG
MCHC RBC-ENTMCNC: 32.1 GM/DL
MCV RBC AUTO: 96 FL
MONOCYTES # BLD AUTO: 0.84 K/UL
MONOCYTES NFR BLD AUTO: 11.3 %
NEUTROPHILS # BLD AUTO: 5.25 K/UL
NEUTROPHILS NFR BLD AUTO: 70.7 %
NONHDLC SERPL-MCNC: 127 MG/DL
PLATELET # BLD AUTO: 278 K/UL
POTASSIUM SERPL-SCNC: 4.6 MMOL/L
PROT SERPL-MCNC: 7 G/DL
RBC # BLD: 3.73 M/UL
RBC # FLD: 15.1 %
SODIUM SERPL-SCNC: 138 MMOL/L
TRIGL SERPL-MCNC: 101 MG/DL
WBC # FLD AUTO: 7.43 K/UL

## 2022-12-07 NOTE — ASSESSMENT
[Medical Care Issues] : medical care issues [FreeTextEntry1] : Pulmonary Tuberculosis\par panssuceptible M tb\par history of INH associated hepatoxicity  (INH stopped 7/16/22)\par tolerating TB therapy\par normal lft's\par some increase in cholesterol\par \par pt notes that he will be having CHEST CT to follow up nodule - I request that copy of report be sent to me\par \par completing 9 month course of TB therapy with RIF/EMB/MOX on 2/11/23\par labs

## 2022-12-07 NOTE — CONSULT LETTER
[Dear  ___] : Dear  [unfilled], [Consult Letter:] : I had the pleasure of evaluating your patient, [unfilled]. [Please see my note below.] : Please see my note below. [Consult Closing:] : Thank you very much for allowing me to participate in the care of this patient.  If you have any questions, please do not hesitate to contact me. [Sincerely,] : Sincerely, [DrRemy  ___] : Dr. SLAUGHTER [DrRemy ___] : Dr. SLAUGHTER [FreeTextEntry2] : Dr Peyman Tellez\par [facility]\par [address]\[place] [address]  [FreeTextEntry1] : Mr Shira notes that he will be having CHEST CT to follow up nodule - I request that copy of report be sent to me [FreeTextEntry3] : Everardo Gracia MD, FACP, MAXWELL, AAHIVM\par Infectious Diseases\par [facility]

## 2022-12-07 NOTE — REVIEW OF SYSTEMS
[Feeling Tired] : feeling tired [Negative] : Heme/Lymph [___ # of Missed Doses in The Past Week] : [unfilled] doses missed in the past week  [FreeTextEntry4] : hoarse voice

## 2022-12-07 NOTE — HISTORY OF PRESENT ILLNESS
[FreeTextEntry1] : The same\par weight at home remains in same range 161 - 164#\par eating is still difficult - always eats dinner - some food during day\par no cough - now getting upper airway congestion. mild cold\par no fever\par tired of pills - but has not missed a single dose\par \par saw Ophthalmology 10/12/22 - no signs of ETM optic neuritis\par \par He continues RIF/EMB/MOX - planned to complet on 2/11/23\par skypes for remote Directly Observed Therapy with [facility] on almost all weekdays\par last + sputum for MTb was 5/17/22 - had negative cultures 5/18. 7/6. 81/0 and 9/27/22\par \par had flu and covid booster mid October 2022\par \par Mr Silva saw his cardiologist  - echo done, "everything ok"  to follow up March 2023\par Statin was discontinued 7/14/22\par 9/27/22 Cholesterol Total/HDL/LDL = 203/55/127\par 12/6/22: Cholesterol Total/HDL/LDL = 179/52/106\par \par \par As previously noted:\par \par Mr Silva was hospitalized [facility] 5/11 --> 5/13/22\par 66M with coronary artery disease, acute inferior wall MI s/p KEVIN to RCA (2007), hypertension, hyperlipidemia, tobacco use, emphysema depression, and esophageal reflux, admitted 5/11/22 with 5/5/22 sputum showing RARE [employer]B and 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 [neg gene for RIF resistance]\par He was COVID+\par 5/17/22 neg smear grew Mtb - last positive cx\par 5/18 neg AFB smear and cultures\par 7/6:neg AFB smear and cultures\par 8/10: neg AFB smear and cultures [10/33/22 ADD; NEG at 6 weeks]\par 9/27/22 Sputum AFB neg at 6 weeks   {Add 11/7/22]\par \par \par RIPE 5/11/22-->\par  EMB stopped 6/21 when isolate noted to be pansusceptible\par 6/23 AST/ALT = 27/41 TB/alk phos = 1.0/83 nl LFTs noted at onset of anorexia\par 7/5 AST/ALT = 80/130 TB/alk phos = 0.7/84 PZA stopped 7/7 \par 7/13: AST/ALT = 81/159 TB/alk phos = 0.8/80\par 7/14; RIF/ INH/Simavastatin stopped EMB resumed 7/17; Moxifloxacin begu and RIF resumed on 7/18\par  Plan: if tolerated 9 month course of RIF,EMB, Moxiflxacin through 2/11/23\par 7/18/22 AST/ALT = 62/130 TB/Alk phos = 0.5/82\par 7/20: AST/ALT = 50/114; TBil/alk phos = 1.0/78.\par 7/25/22: AST/ATIYA = 36/70 T bili/alk phso 0.5/78. \par 8/10/22: WBC = 10/12; 6.1 Eos' AST/ALT= 19/29; TBili/alk phos= 1.5/89; RVP neg; Sputum AFB NEG to date\par 8/30//22 H/H = 11.5/36.6 9.3% Eos; Creat = 0.85; TBili/alk phos= 0.7/76; AST/ALT= 18/19;\par 9/27/22: WBC= 5.32 4.7% Eos; H/H = 13.2/40.3; Creat = 0.77; TBili/alk phos= 0.6/86; AST/ALT= 15/16;\par 11/1/22: WBC= 6.59 2.3% Eos; H/H = 13.2/40.3; Creat = 0.81; TBili/alk phos= 0.6/74; AST/ALT= 15/12; cortisol = 11.9\par 12/6/22: WBC= 7.43; 3.1% Eos; H/H = 11.5/35.8; Creat = 0.79; TBili/alk phos= 0.6/80; AST/ALT= 16/14;

## 2022-12-27 ENCOUNTER — NON-APPOINTMENT (OUTPATIENT)
Age: 67
End: 2022-12-27

## 2023-01-11 ENCOUNTER — NON-APPOINTMENT (OUTPATIENT)
Age: 68
End: 2023-01-11

## 2023-01-25 ENCOUNTER — APPOINTMENT (OUTPATIENT)
Dept: INFECTIOUS DISEASE | Facility: CLINIC | Age: 68
End: 2023-01-25
Payer: MEDICARE

## 2023-01-25 VITALS
SYSTOLIC BLOOD PRESSURE: 120 MMHG | HEART RATE: 88 BPM | OXYGEN SATURATION: 99 % | BODY MASS INDEX: 23.34 KG/M2 | TEMPERATURE: 97.6 F | DIASTOLIC BLOOD PRESSURE: 71 MMHG | WEIGHT: 163 LBS | HEIGHT: 70 IN

## 2023-01-25 DIAGNOSIS — E78.5 HYPERLIPIDEMIA, UNSPECIFIED: ICD-10-CM

## 2023-01-25 PROCEDURE — 99213 OFFICE O/P EST LOW 20 MIN: CPT

## 2023-01-25 NOTE — PHYSICAL EXAM
[General Appearance - Alert] : alert [General Appearance - In No Acute Distress] : in no acute distress [Sclera] : the sclera and conjunctiva were normal [PERRL With Normal Accommodation] : pupils were equal in size, round, reactive to light [Extraocular Movements] : extraocular movements were intact [Outer Ear] : the ears and nose were normal in appearance [Oropharynx] : the oropharynx was normal with no thrush [Neck Appearance] : the appearance of the neck was normal [Jugular Venous Distention Increased] : there was no jugular-venous distention [Neck Cervical Mass (___cm)] : no neck mass was observed [Thyroid Diffuse Enlargement] : the thyroid was not enlarged [Auscultation Breath Sounds / Voice Sounds] : lungs were clear to auscultation bilaterally [FreeTextEntry1] : good air entry, no rhonchi, no wheezing [Heart Rate And Rhythm] : heart rate was normal and rhythm regular [Heart Sounds] : normal S1 and S2 [Heart Sounds Gallop] : no gallops [Murmurs] : no murmurs [Heart Sounds Pericardial Friction Rub] : no pericardial rub [Bowel Sounds] : normal bowel sounds [Abdomen Soft] : soft [Abdomen Tenderness] : non-tender [Abdomen Mass (___ Cm)] : no abdominal mass palpated [Costovertebral Angle Tenderness] : no CVA tenderness [No Palpable Adenopathy] : no palpable adenopathy [Musculoskeletal - Swelling] : no joint swelling [Nail Clubbing] : no clubbing  or cyanosis of the fingernails [Motor Tone] : muscle strength and tone were normal [Skin Color & Pigmentation] : normal skin color and pigmentation [] : no rash [No Focal Deficits] : no focal deficits [Oriented To Time, Place, And Person] : oriented to person, place, and time [Affect] : the affect was normal

## 2023-01-25 NOTE — REVIEW OF SYSTEMS
[Difficulty Sleeping] : difficulty sleeping [Feeling Tired] : feeling tired [Normal Appetite] : appetite not normal  [As Noted in HPI] : as noted in HPI [Cough] : cough [Depression] : depression [Negative] : Heme/Lymph

## 2023-01-25 NOTE — CONSULT LETTER
Other Endocrinologists you might want to consider: Dr. Vane Jeronimo or Dina Govea   [Dear  ___] : Dear  [unfilled], [Consult Letter:] : I had the pleasure of evaluating your patient, [unfilled]. [Please see my note below.] : Please see my note below. [Consult Closing:] : Thank you very much for allowing me to participate in the care of this patient.  If you have any questions, please do not hesitate to contact me. [Sincerely,] : Sincerely, [FreeTextEntry2] : Dr Peyman Tellez\par [facility]\par [address]\[place] [address]  [FreeTextEntry3] : Everardo Gracia MD, FACP, MAXWELL, AAHIVM\par Infectious Diseases\par [facility]  [DrRemy  ___] : Dr. SLAUGHTER [DrRemy ___] : Dr. SLAUGHTER

## 2023-01-25 NOTE — HISTORY OF PRESENT ILLNESS
[FreeTextEntry1] : "The same"\par decreased appetite\par He weighs himself each am while undressed - his weight fluctuates between 159 - 162\par He has cough - tickle in back of throat for last 4 weeks or so - like his usual winter cough, though less severe\par He is frustrated, has winter blues (uses light exposure for Seasonal Affective Disorder)\par  no sob, fever\par less interest in socializing, tired, feels "older" \par "I look forward to life after meds."\par \par He has been adherent with his TB meds - He will be completing a 9 months course of treatment - with Moxifloxacin/Ethambutol/Rifampin next month on Feb 11, 2023\par \par He underwent CT/PET scan on 12/30/22 - compared with 1/15/22 study at [facility]:\par Focal nodulare intensely FDG avid activity in RUL on prior study is not identified.\par Persistent FDG avid bilateral patchy opacitis and dependent atelectatic changes\par Intensely FDG avid left intraparotid nodule which appears essentially stable\par Foice of FDG activity in mediastinum and bilateral hilar regions appear more prominent than on previous study\par follow with dedicated non contrast Chest CT recommended in 3 months\par \par had flu and covid booster mid October 2022\par \par Statin was discontinued 7/14/22\par 9/27/22 Cholesterol Total/HDL/LDL = 203/55/127\par 12/6/22: Cholesterol Total/HDL/LDL = 179/52/106\par \par Mr Silva was hospitalized [facility] 5/11 --> 5/13/22\par 66M with coronary artery disease, acute inferior wall MI s/p KEVIN to RCA (2007), hypertension, hyperlipidemia, tobacco use, emphysema, depression, and esophageal reflux, admitted 5/11/22 after  5/5/22 sputum showing RARE AFB and 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 [neg gene for RIF resistance]\par He was COVID+\par 5/17/22 neg smear grew Mtb - last positive cx\par 5/18 neg AFB smear and cultures\par 7/6:neg AFB smear and cultures\par 8/10: neg AFB smear and cultures [10/33/22 ADD; NEG at 6 weeks]\par 9/27/22 Sputum AFB neg at 6 weeks  {Add 11/7/22]\par \par \par RIPE 5/11/22-->\par  EMB stopped 6/21 when isolate noted to be pansusceptible\par 6/23 AST/ALT = 27/41 TB/alk phos = 1.0/83 nl LFTs noted at onset of anorexia\par 7/5 AST/ALT = 80/130 TB/alk phos = 0.7/84 PZA stopped 7/7 \par 7/13: AST/ALT = 81/159 TB/alk phos = 0.8/80\par 7/14; RIF/ INH/Simavastatin stopped EMB resumed 7/17; Moxifloxacin begun and RIF resumed on 7/18\par  Plan: if tolerated 9 month course of RIF,EMB, Moxiflxacin through 2/11/23\par 7/18/22 AST/ALT = 62/130 TB/Alk phos = 0.5/82\par 7/20: AST/ALT = 50/114; TBil/alk phos = 1.0/78.\par 7/25/22: AST/ATIYA = 36/70 T bili/alk phso 0.5/78. \par 8/10/22: WBC = 10/12; 6.1 Eos' AST/ALT= 19/29; TBili/alk phos= 1.5/89; RVP neg; Sputum AFB NEG to date\par 8/30//22 H/H = 11.5/36.6 9.3% Eos; Creat = 0.85; TBili/alk phos= 0.7/76; AST/ALT= 18/19;\par 9/27/22: WBC= 5.32 4.7% Eos; H/H = 13.2/40.3; Creat = 0.77; TBili/alk phos= 0.6/86; AST/ALT= 15/16;\par 11/1/22: WBC= 6.59 2.3% Eos; H/H = 13.2/40.3; Creat = 0.81; TBili/alk phos= 0.6/74; AST/ALT= 15/12; cortisol = 11.9\par 12/6/22: WBC= 7.43; 3.1% Eos; H/H = 11.5/35.8; Creat = 0.79; TBili/alk phos= 0.6/80; AST/ALT= 16/14; \par  \par

## 2023-01-25 NOTE — ASSESSMENT
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

## 2023-01-27 LAB
ALBUMIN SERPL ELPH-MCNC: 4.3 G/DL
ALP BLD-CCNC: 78 U/L
ALT SERPL-CCNC: 15 U/L
ANION GAP SERPL CALC-SCNC: 12 MMOL/L
AST SERPL-CCNC: 13 U/L
BASOPHILS # BLD AUTO: 0.03 K/UL
BASOPHILS NFR BLD AUTO: 0.5 %
BILIRUB SERPL-MCNC: 0.3 MG/DL
BUN SERPL-MCNC: 11 MG/DL
CALCIUM SERPL-MCNC: 9.7 MG/DL
CHLORIDE SERPL-SCNC: 103 MMOL/L
CHOLEST SERPL-MCNC: 189 MG/DL
CO2 SERPL-SCNC: 24 MMOL/L
CREAT SERPL-MCNC: 0.81 MG/DL
EGFR: 97 ML/MIN/1.73M2
EOSINOPHIL # BLD AUTO: 0.16 K/UL
EOSINOPHIL NFR BLD AUTO: 2.8 %
GLUCOSE SERPL-MCNC: 78 MG/DL
HCT VFR BLD CALC: 36.3 %
HDLC SERPL-MCNC: 58 MG/DL
HGB BLD-MCNC: 11.8 G/DL
IMM GRANULOCYTES NFR BLD AUTO: 0.7 %
LDLC SERPL CALC-MCNC: 112 MG/DL
LYMPHOCYTES # BLD AUTO: 0.99 K/UL
LYMPHOCYTES NFR BLD AUTO: 17.1 %
MAN DIFF?: NORMAL
MCHC RBC-ENTMCNC: 31.1 PG
MCHC RBC-ENTMCNC: 32.5 GM/DL
MCV RBC AUTO: 95.5 FL
MONOCYTES # BLD AUTO: 0.49 K/UL
MONOCYTES NFR BLD AUTO: 8.5 %
NEUTROPHILS # BLD AUTO: 4.08 K/UL
NEUTROPHILS NFR BLD AUTO: 70.4 %
NONHDLC SERPL-MCNC: 130 MG/DL
PLATELET # BLD AUTO: 281 K/UL
POTASSIUM SERPL-SCNC: 4.8 MMOL/L
PROT SERPL-MCNC: 7 G/DL
RBC # BLD: 3.8 M/UL
RBC # FLD: 15.4 %
SODIUM SERPL-SCNC: 140 MMOL/L
TRIGL SERPL-MCNC: 93 MG/DL
WBC # FLD AUTO: 5.79 K/UL

## 2023-04-12 ENCOUNTER — APPOINTMENT (OUTPATIENT)
Dept: OPHTHALMOLOGY | Facility: CLINIC | Age: 68
End: 2023-04-12
Payer: MEDICARE

## 2023-04-12 ENCOUNTER — NON-APPOINTMENT (OUTPATIENT)
Age: 68
End: 2023-04-12

## 2023-04-12 PROCEDURE — 92283 EXTND COLOR VISION XM: CPT

## 2023-04-12 PROCEDURE — 92012 INTRM OPH EXAM EST PATIENT: CPT

## 2023-04-26 ENCOUNTER — APPOINTMENT (OUTPATIENT)
Dept: INFECTIOUS DISEASE | Facility: CLINIC | Age: 68
End: 2023-04-26
Payer: MEDICARE

## 2023-04-26 VITALS
BODY MASS INDEX: 24.34 KG/M2 | SYSTOLIC BLOOD PRESSURE: 119 MMHG | OXYGEN SATURATION: 98 % | HEIGHT: 70 IN | TEMPERATURE: 97.6 F | DIASTOLIC BLOOD PRESSURE: 74 MMHG | WEIGHT: 170 LBS | HEART RATE: 77 BPM

## 2023-04-26 DIAGNOSIS — A15.9 RESPIRATORY TUBERCULOSIS UNSPECIFIED: ICD-10-CM

## 2023-04-26 PROCEDURE — 99214 OFFICE O/P EST MOD 30 MIN: CPT

## 2023-04-26 RX ORDER — ETHAMBUTOL HYDROCHLORIDE 400 MG/1
400 TABLET ORAL
Qty: 90 | Refills: 3 | Status: COMPLETED | COMMUNITY
Start: 2022-07-14 | End: 2023-02-11

## 2023-04-26 RX ORDER — MOXIFLOXACIN HYDROCHLORIDE TABLETS, 400 MG 400 MG/1
400 TABLET, FILM COATED ORAL DAILY
Qty: 30 | Refills: 0 | Status: COMPLETED | COMMUNITY
Start: 2022-07-14 | End: 2023-02-11

## 2023-04-26 RX ORDER — RIFAMPIN 300 MG/1
300 CAPSULE ORAL
Qty: 60 | Refills: 5 | Status: COMPLETED | COMMUNITY
Start: 2022-06-03 | End: 2023-02-11

## 2023-04-26 NOTE — CONSULT LETTER
[Dear  ___] : Dear  [unfilled], [Consult Letter:] : I had the pleasure of evaluating your patient, [unfilled]. [Please see my note below.] : Please see my note below. [Consult Closing:] : Thank you very much for allowing me to participate in the care of this patient.  If you have any questions, please do not hesitate to contact me. [Sincerely,] : Sincerely, [DrRemy  ___] : Dr. SLAUGHTER [DrRemy ___] : Dr. SLAUGHTER [FreeTextEntry2] : Dr Peyman Tellez\par [facility]\par [address]\[place] [address] \par  [FreeTextEntry3] : Everardo Gracia MD, FACP, MAXWELL, AAHIVM\par Infectious Diseases\par [facility]

## 2023-04-26 NOTE — HISTORY OF PRESENT ILLNESS
[FreeTextEntry1] : Much better!\par Mr Silva completed 9 month course of TB therapy mostly with Moxifloxaxin, Ethambutol, and Rifampin on 2/11/23\par He started feeling better and regained his appetite\par In mid March he noted increased appetite, improved energy and well being\par He gained 7# since 1/25/23 on our scale - current weight = 170#  BMI= 24.39\par He has had mild GI upset - now improved\par Occasional dry cough\par No sob, fevers\par \par He underwent CT/PET scan on 12/30/22 - compared with 1/15/22 study at [facility]:\par Focal nodulare intensely FDG avid activity in RUL on prior study is not identified.\par Persistent FDG avid bilateral patchy opacitis and dependent atelectatic changes\par Intensely FDG avid left intraparotid nodule which appears essentially stable\par Foice of FDG activity in mediastinum and bilateral hilar regions appear more prominent than on previous study\par follow with dedicated non contrast Chest CT recommended in 3 months\par He will be undergoing follow up chest CT and then will follow up with pulmonary physician in about one month.\par \par had flu and covid booster mid October 2022\par \par Statin was discontinued 7/14/22\par 9/27/22 Cholesterol Total/HDL/LDL = 203/55/127\par 12/6/22: Cholesterol Total/HDL/LDL = 179/52/106\par 1/23/23: Cholesterol Total/HDL/LDL = 189/58/112\par \par Mr Silva was hospitalized [facility] 5/11 --> 5/13/22\par 66M with coronary artery disease, acute inferior wall MI s/p KEVIN to RCA (2007), hypertension, hyperlipidemia, tobacco use, emphysema, depression, and esophageal reflux, admitted 5/11/22 after 5/5/22 sputum showing RARE [employer] and 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 [neg gene for RIF resistance]\par He was COVID+\par 5/17/22 neg smear grew Mtb - last positive cx\par 5/18 neg AFB smear and cultures\par 7/6:neg AFB smear and cultures\par 8/10: neg AFB smear and cultures [10/33/22 ADD; NEG at 6 weeks]\par 9/27/22 Sputum AFB neg at 6 weeks  {Add 11/7/22]\par \par \par RIPE 5/11/22-->\par  EMB stopped 6/21 when isolate noted to be pansusceptible\par 6/23 AST/ALT = 27/41 TB/alk phos = 1.0/83 nl LFTs noted at onset of anorexia\par 7/5 AST/ALT = 80/130 TB/alk phos = 0.7/84 PZA stopped 7/7 \par 7/13: AST/ALT = 81/159 TB/alk phos = 0.8/80\par 7/14; RIF/ INH/Simavastatin stopped EMB resumed 7/17; Moxifloxacin begun and RIF resumed on 7/18\par  Plan: if tolerated 9 month course of RIF,EMB, Moxiflxacin through 2/11/23\par 7/18/22 AST/ALT = 62/130 TB/Alk phos = 0.5/82\par 7/20: AST/ALT = 50/114; TBil/alk phos = 1.0/78.\par 7/25/22: AST/ATIYA = 36/70 T bili/alk phso 0.5/78. \par 8/10/22: WBC = 10/12; 6.1 Eos' AST/ALT= 19/29; TBili/alk phos= 1.5/89; RVP neg; Sputum AFB NEG to date\par 8/30//22 H/H = 11.5/36.6 9.3% Eos; Creat = 0.85; TBili/alk phos= 0.7/76; AST/ALT= 18/19;\par 9/27/22: WBC= 5.32 4.7% Eos; H/H = 13.2/40.3; Creat = 0.77; TBili/alk phos= 0.6/86; AST/ALT= 15/16;\par 11/1/22: WBC= 6.59 2.3% Eos; H/H = 13.2/40.3; Creat = 0.81; TBili/alk phos= 0.6/74; AST/ALT= 15/12; cortisol = 11.9\par 12/6/22: WBC= 7.43; 3.1% Eos; H/H = 11.5/35.8; Creat = 0.79; TBili/alk phos= 0.6/80; AST/ALT= 16/14; \par 1/23/23: H/H = 11.8/36.3; Creat = 0.79; TBili/alk phos= 0.3/78; AST/ALT= 13/15; \par \par He was seen by Ophthalmology 4/12/23 - very mild cataracts, no retinopathy\par  \par

## 2023-04-26 NOTE — PHYSICAL EXAM
[General Appearance - Alert] : alert [General Appearance - In No Acute Distress] : in no acute distress [General Appearance - Well-Appearing] : healthy appearing [Sclera] : the sclera and conjunctiva were normal [PERRL With Normal Accommodation] : pupils were equal in size, round, reactive to light [Extraocular Movements] : extraocular movements were intact [Outer Ear] : the ears and nose were normal in appearance [Neck Appearance] : the appearance of the neck was normal [Neck Cervical Mass (___cm)] : no neck mass was observed [Jugular Venous Distention Increased] : there was no jugular-venous distention [Thyroid Diffuse Enlargement] : the thyroid was not enlarged [Auscultation Breath Sounds / Voice Sounds] : lungs were clear to auscultation bilaterally [] : no respiratory distress [Edema] : there was no peripheral edema [Musculoskeletal - Swelling] : no joint swelling [Nail Clubbing] : no clubbing  or cyanosis of the fingernails [Motor Tone] : muscle strength and tone were normal [Oriented To Time, Place, And Person] : oriented to person, place, and time [Affect] : the affect was normal

## 2023-04-26 NOTE — ASSESSMENT
[Medical Care Issues] : medical care issues [FreeTextEntry1] : Successful completion of prescribed  9 months course of treatment - with Moxifloxacin/Ethambutol/Rifampin as of  Feb 11, 2023 with symptomatic improvement and weight gain off antibiotics. No clinical indication of residual TB. He will be undergoing follow up Chest CT within the next month.\par \par Labs will be done today and shared with his other docs\par The decision to resume statin will be made by his cardiologist.\par Mr Silva was encouraged to resume exercise\par \par He is invited to follow up as needed. In the unlikely event of weight loss, night sweats, progressive cough he should be evaluated and provided with chest imaging.

## 2023-04-27 LAB
ALBUMIN SERPL ELPH-MCNC: 4.5 G/DL
ALP BLD-CCNC: 81 U/L
ALT SERPL-CCNC: 13 U/L
ANION GAP SERPL CALC-SCNC: 13 MMOL/L
AST SERPL-CCNC: 16 U/L
BASOPHILS # BLD AUTO: 0.06 K/UL
BASOPHILS NFR BLD AUTO: 0.9 %
BILIRUB SERPL-MCNC: 0.7 MG/DL
BUN SERPL-MCNC: 18 MG/DL
CALCIUM SERPL-MCNC: 10 MG/DL
CHLORIDE SERPL-SCNC: 102 MMOL/L
CHOLEST SERPL-MCNC: 186 MG/DL
CK SERPL-CCNC: 71 U/L
CO2 SERPL-SCNC: 25 MMOL/L
CREAT SERPL-MCNC: 0.93 MG/DL
EGFR: 91 ML/MIN/1.73M2
EOSINOPHIL # BLD AUTO: 0.3 K/UL
EOSINOPHIL NFR BLD AUTO: 4.3 %
FOLATE SERPL-MCNC: 6.9 NG/ML
GLUCOSE SERPL-MCNC: 90 MG/DL
HCT VFR BLD CALC: 41.3 %
HDLC SERPL-MCNC: 65 MG/DL
HGB BLD-MCNC: 13.1 G/DL
IMM GRANULOCYTES NFR BLD AUTO: 1.3 %
IRON SATN MFR SERPL: 41 %
IRON SERPL-MCNC: 129 UG/DL
LDLC SERPL CALC-MCNC: 108 MG/DL
LYMPHOCYTES # BLD AUTO: 1.62 K/UL
LYMPHOCYTES NFR BLD AUTO: 23 %
MAN DIFF?: NORMAL
MCHC RBC-ENTMCNC: 30.6 PG
MCHC RBC-ENTMCNC: 31.7 GM/DL
MCV RBC AUTO: 96.5 FL
MONOCYTES # BLD AUTO: 0.64 K/UL
MONOCYTES NFR BLD AUTO: 9.1 %
NEUTROPHILS # BLD AUTO: 4.33 K/UL
NEUTROPHILS NFR BLD AUTO: 61.4 %
NONHDLC SERPL-MCNC: 121 MG/DL
PLATELET # BLD AUTO: 352 K/UL
POTASSIUM SERPL-SCNC: 4.8 MMOL/L
PROT SERPL-MCNC: 7.1 G/DL
PSA FREE FLD-MCNC: 31 %
PSA FREE SERPL-MCNC: 0.03 NG/ML
PSA SERPL-MCNC: 0.1 NG/ML
RBC # BLD: 4.28 M/UL
RBC # FLD: 15.5 %
SODIUM SERPL-SCNC: 139 MMOL/L
TIBC SERPL-MCNC: 314 UG/DL
TRIGL SERPL-MCNC: 67 MG/DL
UIBC SERPL-MCNC: 185 UG/DL
VIT B12 SERPL-MCNC: 610 PG/ML
WBC # FLD AUTO: 7.04 K/UL

## 2023-06-08 ENCOUNTER — APPOINTMENT (OUTPATIENT)
Dept: OPHTHALMOLOGY | Facility: CLINIC | Age: 68
End: 2023-06-08
Payer: MEDICARE

## 2023-06-08 ENCOUNTER — NON-APPOINTMENT (OUTPATIENT)
Age: 68
End: 2023-06-08

## 2023-06-08 PROCEDURE — 92014 COMPRE OPH EXAM EST PT 1/>: CPT

## 2023-07-06 ENCOUNTER — NON-APPOINTMENT (OUTPATIENT)
Age: 68
End: 2023-07-06

## 2023-07-06 ENCOUNTER — APPOINTMENT (OUTPATIENT)
Dept: OPHTHALMOLOGY | Facility: CLINIC | Age: 68
End: 2023-07-06
Payer: MEDICARE

## 2023-07-06 PROCEDURE — 92012 INTRM OPH EXAM EST PATIENT: CPT

## 2024-01-11 ENCOUNTER — APPOINTMENT (OUTPATIENT)
Dept: OPHTHALMOLOGY | Facility: CLINIC | Age: 69
End: 2024-01-11
Payer: MEDICARE

## 2024-01-11 ENCOUNTER — NON-APPOINTMENT (OUTPATIENT)
Age: 69
End: 2024-01-11

## 2024-01-11 PROCEDURE — 92012 INTRM OPH EXAM EST PATIENT: CPT

## 2024-01-23 NOTE — ED ADULT NURSE NOTE - TEMPLATE LIST FOR HEAD TO TOE ASSESSMENT
pregnant patients, recommended with each pregnancy.   Shingles Vaccine (Shingrix) - COST NOT COVERED BY MEDICARE PART B  2 shot series recommended in those 19 years and older who have or will have weakened immune systems or those 50 years and older     Health Maintenance Due:      Topic Date Due   • Hepatitis C Screening  Never done   • Breast Cancer Screening: Mammogram  01/03/2024   • Colorectal Cancer Screening  02/07/2025     Immunizations Due:      Topic Date Due   • Influenza Vaccine (1) 09/01/2023   • COVID-19 Vaccine (4 - 2023-24 season) 09/01/[employer]       Abdominal Pain, N/V/D

## 2024-04-30 VITALS
BODY MASS INDEX: 25.77 KG/M2 | TEMPERATURE: 98.4 F | SYSTOLIC BLOOD PRESSURE: 102 MMHG | HEIGHT: 70 IN | WEIGHT: 180 LBS | DIASTOLIC BLOOD PRESSURE: 66 MMHG | RESPIRATION RATE: 17 BRPM | HEART RATE: 67 BPM | OXYGEN SATURATION: 97 %

## 2024-07-11 ENCOUNTER — APPOINTMENT (OUTPATIENT)
Dept: OPHTHALMOLOGY | Facility: CLINIC | Age: 69
End: 2024-07-11

## 2024-07-11 PROCEDURE — 92012 INTRM OPH EXAM EST PATIENT: CPT

## 2024-08-29 ENCOUNTER — NON-APPOINTMENT (OUTPATIENT)
Age: 69
End: 2024-08-29

## 2024-08-29 DIAGNOSIS — Z87.01 PERSONAL HISTORY OF PNEUMONIA (RECURRENT): ICD-10-CM

## 2024-08-29 DIAGNOSIS — R91.1 SOLITARY PULMONARY NODULE: ICD-10-CM

## 2024-08-29 DIAGNOSIS — J43.9 EMPHYSEMA, UNSPECIFIED: ICD-10-CM

## 2024-08-29 DIAGNOSIS — R09.82 POSTNASAL DRIP: ICD-10-CM

## 2024-08-29 RX ORDER — SIMVASTATIN 10 MG/1
10 TABLET, FILM COATED ORAL
Refills: 0 | Status: ACTIVE | COMMUNITY

## 2024-09-04 NOTE — PATIENT PROFILE ADULT - FALL HARM RISK TYPE OF ASSESSMENT
admission

## 2024-09-09 NOTE — DISCHARGE NOTE PROVIDER - NSCORESITESY/N_GEN_A_CORE_RD
Diagnosis: ESRD (end stage renal disease) [750595]   Future Attending Provider: EDA AVILA [[id number]]  
No

## 2024-11-04 ENCOUNTER — APPOINTMENT (OUTPATIENT)
Facility: CLINIC | Age: 69
End: 2024-11-04
Payer: MEDICARE

## 2024-11-04 ENCOUNTER — APPOINTMENT (OUTPATIENT)
Dept: PULMONOLOGY | Facility: CLINIC | Age: 69
End: 2024-11-04

## 2024-11-04 VITALS
OXYGEN SATURATION: 92 % | WEIGHT: 193 LBS | BODY MASS INDEX: 27.63 KG/M2 | RESPIRATION RATE: 17 BRPM | SYSTOLIC BLOOD PRESSURE: 110 MMHG | TEMPERATURE: 98.5 F | HEIGHT: 70 IN | DIASTOLIC BLOOD PRESSURE: 69 MMHG | HEART RATE: 76 BPM

## 2024-11-04 DIAGNOSIS — A15.9 RESPIRATORY TUBERCULOSIS UNSPECIFIED: ICD-10-CM

## 2024-11-04 DIAGNOSIS — J43.9 EMPHYSEMA, UNSPECIFIED: ICD-10-CM

## 2024-11-04 DIAGNOSIS — Z15.01 GENETIC SUSCEPTIBILITY TO MALIGNANT NEOPLASM OF BREAST: ICD-10-CM

## 2024-11-04 DIAGNOSIS — I10 ESSENTIAL (PRIMARY) HYPERTENSION: ICD-10-CM

## 2024-11-04 DIAGNOSIS — Z15.09 GENETIC SUSCEPTIBILITY TO MALIGNANT NEOPLASM OF BREAST: ICD-10-CM

## 2024-11-04 DIAGNOSIS — R09.82 POSTNASAL DRIP: ICD-10-CM

## 2024-11-04 DIAGNOSIS — I25.10 ATHEROSCLEROTIC HEART DISEASE OF NATIVE CORONARY ARTERY W/OUT ANGINA PECTORIS: ICD-10-CM

## 2024-11-04 DIAGNOSIS — E78.5 HYPERLIPIDEMIA, UNSPECIFIED: ICD-10-CM

## 2024-11-04 PROCEDURE — 99214 OFFICE O/P EST MOD 30 MIN: CPT

## 2024-11-04 PROCEDURE — 99204 OFFICE O/P NEW MOD 45 MIN: CPT

## 2025-01-02 ENCOUNTER — NON-APPOINTMENT (OUTPATIENT)
Age: 70
End: 2025-01-02

## 2025-01-16 ENCOUNTER — APPOINTMENT (OUTPATIENT)
Dept: OPHTHALMOLOGY | Facility: CLINIC | Age: 70
End: 2025-01-16
Payer: MEDICARE

## 2025-01-16 ENCOUNTER — NON-APPOINTMENT (OUTPATIENT)
Age: 70
End: 2025-01-16

## 2025-01-16 PROCEDURE — 92014 COMPRE OPH EXAM EST PT 1/>: CPT

## 2025-02-21 NOTE — H&P PST ADULT - NS PRO FEM  PAP SMEARS 3YRS
Caller: IvyArmaan    Relationship: Self    Best call back number:   Telephone Information:   Mobile [phone]        Requested Prescriptions:   Requested Prescriptions     Pending Prescriptions Disp Refills    gabapentin (NEURONTIN) 400 MG capsule 90 capsule 2     Sig: Take 1 capsule by mouth 3 (Three) Times a Day.    ibuprofen (ADVIL,MOTRIN) 800 MG tablet 100 tablet 0     Sig: Take 1 tablet by mouth Every 8 (Eight) Hours As Needed for Mild Pain or Moderate Pain.        Pharmacy where request should be sent: [facility] [address] [phone] [facility] [phone]      Last office visit with prescribing clinician: 9/17/2024   Last telemedicine visit with prescribing clinician: Visit date not found   Next office visit with prescribing clinician: Visit date not found     Additional details provided by patient:     Does the patient have less than a 3 day supply:  [x] Yes  [] No    Would you like a call back once the refill request has been completed: [] Yes [x] No    If the office needs to give you a call back, can they leave a voicemail: [] Yes [x] No    Wilbur Rosales Rep   02/17/25 11:04 EST         
See office note 2/21/2025  
not applicable (Male)